# Patient Record
Sex: MALE | Race: WHITE | URBAN - METROPOLITAN AREA
[De-identification: names, ages, dates, MRNs, and addresses within clinical notes are randomized per-mention and may not be internally consistent; named-entity substitution may affect disease eponyms.]

---

## 2017-10-21 ENCOUNTER — HOSPITAL ENCOUNTER (INPATIENT)
Dept: MEDSURG UNIT | Facility: CLINIC | Age: 72
Discharge: HOME | End: 2017-11-15
Attending: INTERNAL MEDICINE | Admitting: INTERNAL MEDICINE

## 2017-10-21 ENCOUNTER — ANESTHESIA - HEALTHEAST (OUTPATIENT)
Dept: INTERVENTIONAL RADIOLOGY/VASCULAR | Facility: CLINIC | Age: 72
End: 2017-10-21

## 2017-10-21 DIAGNOSIS — I60.8 ANEURYSMAL SUBARACHNOID HEMORRHAGE (H): ICD-10-CM

## 2017-10-21 DIAGNOSIS — I72.9 ANEURYSM (H): ICD-10-CM

## 2017-10-21 DIAGNOSIS — I60.9 SAH (SUBARACHNOID HEMORRHAGE) (H): ICD-10-CM

## 2017-10-21 DIAGNOSIS — R52 PAIN: ICD-10-CM

## 2017-10-21 DIAGNOSIS — S30.810A EXCORIATION OF BUTTOCK, INITIAL ENCOUNTER: ICD-10-CM

## 2017-10-21 DIAGNOSIS — R53.81 PHYSICAL DECONDITIONING: ICD-10-CM

## 2017-10-21 DIAGNOSIS — G91.1 OBSTRUCTIVE HYDROCEPHALUS (H): ICD-10-CM

## 2017-10-21 DIAGNOSIS — G93.6 BRAIN EDEMA (H): ICD-10-CM

## 2017-10-21 DIAGNOSIS — G93.40 ENCEPHALOPATHY ACUTE: ICD-10-CM

## 2017-10-21 DIAGNOSIS — F06.30 MOOD DISORDER DUE TO A GENERAL MEDICAL CONDITION: ICD-10-CM

## 2017-10-21 ASSESSMENT — MIFFLIN-ST. JEOR
SCORE: 1622.5
SCORE: 1661.5
SCORE: 1622.5

## 2017-10-22 ENCOUNTER — ANESTHESIA - HEALTHEAST (OUTPATIENT)
Dept: SURGERY | Facility: CLINIC | Age: 72
End: 2017-10-22

## 2017-10-22 ENCOUNTER — SURGERY - HEALTHEAST (OUTPATIENT)
Dept: SURGERY | Facility: CLINIC | Age: 72
End: 2017-10-22

## 2017-10-22 LAB
ATRIAL RATE - MUSE: 75 BPM
DIASTOLIC BLOOD PRESSURE - MUSE: NORMAL MMHG
INTERPRETATION ECG - MUSE: NORMAL
P AXIS - MUSE: 38 DEGREES
PR INTERVAL - MUSE: 172 MS
QRS DURATION - MUSE: 94 MS
QT - MUSE: 404 MS
QTC - MUSE: 451 MS
R AXIS - MUSE: 28 DEGREES
SYSTOLIC BLOOD PRESSURE - MUSE: NORMAL MMHG
T AXIS - MUSE: 45 DEGREES
VENTRICULAR RATE- MUSE: 75 BPM

## 2017-10-23 ASSESSMENT — MIFFLIN-ST. JEOR
SCORE: 1656.5
SCORE: 1656.5

## 2017-10-25 ASSESSMENT — MIFFLIN-ST. JEOR
SCORE: 1691.5
SCORE: 1691.5

## 2017-10-26 ASSESSMENT — MIFFLIN-ST. JEOR
SCORE: 1653.5
SCORE: 1653.5

## 2017-10-27 ASSESSMENT — MIFFLIN-ST. JEOR
SCORE: 1642.5
SCORE: 1642.5

## 2017-10-28 ASSESSMENT — MIFFLIN-ST. JEOR
SCORE: 1697.5
SCORE: 1697.5

## 2017-10-29 ASSESSMENT — MIFFLIN-ST. JEOR
SCORE: 1595.5
SCORE: 1595.5

## 2017-10-30 ASSESSMENT — MIFFLIN-ST. JEOR
SCORE: 1560.5
SCORE: 1560.5

## 2017-10-31 ASSESSMENT — MIFFLIN-ST. JEOR
SCORE: 1580.41
SCORE: 1580.41

## 2017-11-01 ASSESSMENT — MIFFLIN-ST. JEOR
SCORE: 1556.82
SCORE: 1556.82

## 2017-11-02 ASSESSMENT — MIFFLIN-ST. JEOR
SCORE: 1556.82
SCORE: 1556.82

## 2017-11-07 ASSESSMENT — MIFFLIN-ST. JEOR
SCORE: 1526.43
SCORE: 1526.43

## 2017-11-08 ASSESSMENT — MIFFLIN-ST. JEOR
SCORE: 1531.87
SCORE: 1531.87

## 2017-11-09 ASSESSMENT — MIFFLIN-ST. JEOR
SCORE: 1518.26
SCORE: 1518.26

## 2017-11-10 ASSESSMENT — MIFFLIN-ST. JEOR
SCORE: 1518.26
SCORE: 1518.26

## 2017-11-13 RX ORDER — QUETIAPINE FUMARATE 25 MG/1
25 TABLET, FILM COATED ORAL AT BEDTIME
Qty: 30 TABLET | Refills: 0 | Status: SHIPPED | OUTPATIENT
Start: 2017-11-13

## 2017-11-13 RX ORDER — ACETAMINOPHEN 325 MG/1
650 TABLET ORAL EVERY 4 HOURS PRN
Refills: 0 | Status: SHIPPED | COMMUNITY
Start: 2017-11-13

## 2017-11-13 RX ORDER — LISINOPRIL 20 MG/1
20 TABLET ORAL 2 TIMES DAILY
Qty: 60 TABLET | Refills: 0 | Status: SHIPPED | OUTPATIENT
Start: 2017-11-13

## 2017-11-14 ASSESSMENT — MIFFLIN-ST. JEOR
SCORE: 1507.83
SCORE: 1507.83

## 2017-11-15 ENCOUNTER — COMMUNICATION - HEALTHEAST (OUTPATIENT)
Dept: NEUROSURGERY | Facility: CLINIC | Age: 72
End: 2017-11-15

## 2017-11-15 LAB
CREAT SERPL-MCNC: 1.06 MG/DL (ref 0.7–1.3)
GFR SERPL CREATININE-BSD FRML MDRD: >60 ML/MIN/1.73M2

## 2017-11-15 RX ORDER — LABETALOL 100 MG/1
100 TABLET, FILM COATED ORAL 2 TIMES DAILY
Qty: 60 TABLET | Refills: 0 | Status: SHIPPED | OUTPATIENT
Start: 2017-11-15

## 2017-11-15 ASSESSMENT — MIFFLIN-ST. JEOR
SCORE: 1504.5
SCORE: 1504.5

## 2017-12-01 ENCOUNTER — COMMUNICATION - HEALTHEAST (OUTPATIENT)
Dept: NEUROSURGERY | Facility: CLINIC | Age: 72
End: 2017-12-01

## 2018-02-06 ENCOUNTER — OFFICE VISIT - HEALTHEAST (OUTPATIENT)
Dept: NEUROSURGERY | Facility: CLINIC | Age: 73
End: 2018-02-06

## 2018-02-06 DIAGNOSIS — I67.1 CEREBRAL ANEURYSM: ICD-10-CM

## 2018-07-28 ENCOUNTER — COMMUNICATION - HEALTHEAST (OUTPATIENT)
Dept: SCHEDULING | Facility: CLINIC | Age: 73
End: 2018-07-28

## 2021-05-31 VITALS
WEIGHT: 175.27 LBS | HEIGHT: 68 IN | WEIGHT: 175.27 LBS | BODY MASS INDEX: 26.56 KG/M2 | HEIGHT: 68 IN | BODY MASS INDEX: 26.56 KG/M2

## 2021-06-13 NOTE — PROGRESS NOTES
Patient is approved for Emergency Medical Assistance (EMA). ID # [id number]. The MA system has patient's full name which is Markel Ocampo. EMA Care Plan Request will now be submitted (needed EMA in place in order to submit). Raghav, SIOMARA [id number]

## 2021-06-13 NOTE — PROGRESS NOTES
Speech Language/Pathology  Speech Language Evaluation    History:  Diagnosis:   Patient Active Problem List   Diagnosis     SAH (subarachnoid hemorrhage)     Benign essential HTN     Hydrocephalus     Aneurysm     Brain edema     Aneurysmal subarachnoid hemorrhage     Onset date: 10/22/17  Reason for evaluation: SAH with 4 aneurysm clippings 10/22/17   Pertinent History: per H&P: Callie Crocker is a 72 y.o. old male Georgian-speaking male that had the sudden onset of lightheadedness, dizziness and nausea with vomiting and a witnessed syncopal event outside of a store where he was buying earbuds.  This was witnessed by family.  He vomited again in route per the emergency medical service.  He was incontinent of urine.  He complained of a severe headache.  In the emergency room acute subarachnoid hemorrhage was noted on CT scan.  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  Cerebral angiogram showed multiple intracranial aneurysms (5) and he is now scheduled for open surgical exploration and clipping tomorrow.    Subjective:  Patient presents as lethargic and cooperative during this session. Pt was given dilaudid about 1 hour prior evaluation.  An  was present via nephew interpreting.  Objective:  Oral motor function was mildly impaired due to (R) labial droop, however this did not seem to impair speech or swallow.  Motor speech was not impaired.   Speech intelligibility was approximately 100 % at the short phrase level.  Voice was not impaired.  Trach/Vent: NA    Auditory comprehension was not impaired. Patient was able to answer 1 unit yes/no questions with 100 % accuracy independently. Patient was able to follow 1 step  directions with 100 % accuracy independently.    Verbal expression was not impaired. Confrontational naming was 100 % accurate independently. Patient was able to participate in conversation independently.    Cognition was moderately impaired. Patient was  oriented to person and month of year only. He did not know he was in the hospital.  Memory was moderately impaired. Unable to recall that he was in hospital following 3 minute delay, he did recall year. This impairment could also be related to dilaudid.  Problem solving was NT    Assessment:  Patient presents with deficits in, cognition and memory.    Plan:  Speech therapy 3-4 times per week.  Referrals: N/A    20 speech/language minutes     Misti Weston MS, CCC-SLP

## 2021-06-13 NOTE — PROGRESS NOTES
"Spiritual Care Note    Spiritual Assessment:   referred to patient and family this morning by patient's nurse. Patient sitting up in his chair when  arrives; son is present.  spoke with patient via an interpretor. Patient does seem somewhat confused during our visit. Patient spoke about, \"Love being everything and God being everything.\" Patient praised the  for the work she does but didn't appear to make any direct requests of the  for support. Son does seem concerned by his father's confusion but also seems to believe he is improving.     Care Provided:  offered listening and words of support.     Plan of Care: Spiritual care will continue to follow as part of patient's care team.     TED King, BCC    "

## 2021-06-13 NOTE — PROGRESS NOTES
"HOSPITALIST PROGRESS NOTE    Date of Service:  10/25/2017    Primary Care Physician: No Primary Care Provider    Hospital Summary: Callie Crocker is a 72 y.o. male with a history of admitted for lightheadedness dizziness nausea vomiting syncopal episode.  In the ED on evaluation had an acute subarachnoid hemorrhage with a anterior communicating aneurysm.  Underwent cerebral angiography showing multiple intracranial aneurysms.  Patient had clipping of the left MCA and other aneurysms with ventriculostomy placement, close management by neurology and neurosurgery as noted.    SUBJECTIVE: Moderately sedated sleeping    OBJECTIVE:  General Appearance:   BP Readings from Last 3 Encounters:   10/25/17 157/70       /70 (Patient Position: Lying)  Pulse (!) 101  Temp 98.2  F (36.8  C) (Oral)   Resp 15  Ht 5' 8\" (1.727 m)  Wt 208 lb 12.4 oz (94.7 kg)  SpO2 100%  BMI 31.74 kg/m2      GENERAL:  Sleeping  Chest clear to auscultation cardiac exam regular rate and rhythm no peripheral edema    I reviewed the patient's new imaging results.      Assessment and Plan:    Plans for 10/25/2017  -at This point management per neurology and neurosurgery  -Nothing to add from a medical standpoint, we continue to follow peripherally    Acute intracranial hemorrhage  Subarachnoid hemorrhage from aneurysm  Assessment: Status post clipping per neurosurgery being managed closely by neurology and neurosurgery  Plan:  We continue to follow    Hypertension  Assessment: Blood pressure being managed including lisinopril and nimodipine  Plan:  -Continue    Lenard Hoover M.D.  [facility] hospitalist service  7 AM to 6 PM please page through [employer] home page  "

## 2021-06-13 NOTE — PROGRESS NOTES
This note also relates to the following rows which could not be included:  SpO2 - Cannot attach notes to unvalidated device data       10/29/17 0530   Vitals   Heart Rate 67   Resp 16   /81   MAP (mmHg) (Calculated) 110   Noninvasive BP (Mean) 116   ICP/Pressure   ICP  9 mmHg   Hydralazine 20 mg IV push given once to keep SBP <160. Will continue to monitor.

## 2021-06-13 NOTE — PROGRESS NOTES
Critical Care Medicine           11/1/20178:36 AM    Patient seen on morning rounds.     Impulsive behavior, grabbing at support tubes/lines.   Bilateral soft wrist restraints in place.   Ongoing reassessment for restraint need.   Will d/c restraints when able to safely maintain support tubes in place with verbal redirection.    Leilani Wood, CNP

## 2021-06-13 NOTE — PROGRESS NOTES
Progress Note    Assessment/Plan  73 yo M with h/o hypertension who presented 10/21/17 after a witnessed syncopal event. He had sudden onset dizziness, light headedness, nausea, and vomiting. He was taken to ER, CT scan showed acute subarachnoid hemorrhage centered within the basal cisterns; CTA revealed 4.6 x 3.4 mm anteriorly directed CONCEPCION aneurysm. Cerebral angiogram revealed multiple intracranial aneurysms. He underwent emergent left sided crani and clipping of left MCA, left anterior choroidal, left carotid terminus, left CONCEPCION; and placement of ventriculostomy by Dr. RENO Espinal. There remains one unsecured aneurysm on the right. Neurosurgery removed drain 11/1/17. Due to concern about fluctuating mental status and possible seizures continuous EEG performed by Neuro and Keppra dose increased. No clear evidence of seizures. No vasospasm on TCD.      Principal Problem:    SAH (subarachnoid hemorrhage) POD#14, PBD #15 - secondary to ruptured cerebral aneurysm s/p crani with multiple clips    Obstructive Hydrocephalus - External ventricular drain removed 11/1/17 by Neurosurgery        Fluctuating mental status/Acute encephalopathy:   Very slow to improve.  Confused and impulsivity.   No clear seizure on EEG and no vasospasm on TCDs.  Keppra  Low dose seroquel.  This will take quite awhile to improve.        Active Problems:    Fevers on admission (resolved now) - possibly central.  Urine Cx negative. Procalcitonin negative and CXR without infiltrates to suggest pneumonia.  Completed 5 days of empiric Zosyn.        Benign essential HTN - SBP goal 110-160. Continue nimodipine,labetalol and lisinopril        Brain edema - keep sodium in normal to upper normal range       Hyperglycemia - BG monitoring qid and coverage with SSI        Barriers to Discharge : Ongoing Encephalopathy and placement issues.    Anticipated Discharge : Acute rehab vs. LTACH     Principal Problem:    SAH (subarachnoid hemorrhage)  Active  Problems:    Benign essential HTN    Obstructive hydrocephalus    Brain edema    Aneurysmal subarachnoid hemorrhage    Nontraumatic subarachnoid hemorrhage    Encephalopathy acute    Seizure-like activity    Hypernatremia    Brain compression      Subjective    Per sons.  Is still quite confused.  Was eating and ambulating earlier in the day.  No major complaints except mild back pain     Objective    Vital signs in last 24 hours  Temp:  [96.3  F (35.7  C)-97.6  F (36.4  C)] 96.3  F (35.7  C)  Heart Rate:  [64-72] 70  Resp:  [12-21] 21  BP: (119-183)/(64-82) 183/82  Weight:   186 lb 12.8 oz (84.7 kg)    Intake/Output last 3 shifts  I/O last 3 completed shifts:  In: 900 [P.O.:900]  Out: -   Intake/Output this shift:  I/O this shift:  In: -   Out: 100 [Urine:100]    Physical Exam    General appearance: sleeping while seen today.  No distress.  Did open eyes to his son's voice  Previous exams noted (confused and pulling at lines and picking at incision site on scalp. Very impulsive and getting out of bed and chair. Requiring soft wrist restraints.)  Lungs: clear to auscultation bilaterally.  Did note some sleep apnea  Heart: regular rate and rhythm, S1, S2 normal  Abdomen: soft, non-tender  Extremities: atraumatic, no cyanosis or edema  Neurologic: limited while sleeping.  Earlier walking around the unit            Meds    nacl 0.9% Stopped (10/29/17 2300)       influenza vacc high dose (age 65 or >) (PF) 2017-18  0.5 mL Intramuscular Prior to Discharge     insulin aspart (NovoLOG) injection   Subcutaneous TID with meals     insulin aspart (NovoLOG) injection   Subcutaneous QHS     labetalol  100 mg Oral TID     levETIRAcetam  1,000 mg Oral BID     lidocaine (XYLOCAINE) 1 % local injection  10 mL Infiltration Once     lisinopril  40 mg Oral DAILY     niMODipine  60 mg Oral Q4H    Or     niMODipine  60 mg Enteral Tube Q4H     pantoprazole  40 mg Oral QAM AC     polyethylene glycol  17 g Oral DAILY     QUEtiapine  12.5  mg Oral QHS     senna-docusate  1 tablet Oral BID       Pertinent Labs   Lab Results: personally reviewed.   not applicable      Results from last 7 days  Lab Units 11/05/17 0457 11/04/17  0606 11/03/17 0444   LN-SODIUM mmol/L 137 140 136   LN-POTASSIUM mmol/L 4.0 4.0 3.9   LN-CHLORIDE mmol/L 110* 113* 110*   LN-CO2 mmol/L 17* 17* 18*   LN-BLOOD UREA NITROGEN mg/dL 21 25 19   LN-CREATININE mg/dL 0.74 0.82 0.75   LN-CALCIUM mg/dL 9.3 9.5 9.2           Results from last 7 days  Lab Units 11/05/17 0457 11/04/17 0606 11/03/17 0444   LN-WHITE BLOOD CELL COUNT thou/uL 5.0 5.3 5.2   LN-HEMOGLOBIN g/dL 12.4* 13.0* 12.0*   LN-HEMATOCRIT % 36.7* 38.8* 35.6*   LN-PLATELET COUNT thou/uL 436 428 395       Chloé Zuñiga MD  Internal Medicine Hospitalist  11/5/2017, 2:41 PM

## 2021-06-13 NOTE — PROGRESS NOTES
Critical Care Progress Note  10/29/2017   11:28 AM      Admit Date: 10/21/2017  ICU Day: 7  Code Status: full code      Problem List:   Principal Problem:    SAH (subarachnoid hemorrhage)  Active Problems:    Benign essential HTN    Hydrocephalus    Brain edema    Aneurysmal subarachnoid hemorrhage    Nontraumatic subarachnoid hemorrhage    Encephalopathy acute    Seizure-like activity    Hypernatremia          Plan:   1. blood pressure control; parameters set by Neurosurgery with Systolic goal <160. Currently off cardene; receiving PRNs. Lisinopril 40 mg daily and labetalol to 100 mg TID, last increased 10/28/17.   2. Ventric management per Neurosurgery.  Continue to wean ventric.  Plan is to raise the ventriculostomy today and possibly clamp later.  3. Autodiuresing; watch sodium, now normalized.     4. Repeat imaging for any change in neuro exam.  5. Nimodipine as ordered by Neurosurgery.  6. Daily TCDs  7. Continue Zosyn for now and likely complete 7-10 days, vancomycin was discontinued    ICU Prophylaxis   Feeding: as above  Pringle: yes - strict I/O   Analgesia/Sedation: prn  DVT prophylaxis: SCDs  HOB > 30 degrees: Yes - must be maintained at 30degrees.    GI Proph: Protonix   Glycemic Control: SSI   Family updated: yes - at bedside    Dispo: ICU due to ongoing monitoring of ICP in patient with multiple intracranial aneurysms. High risk for acute deterioration and death.     Arjun Bourne, [facility] Pulmonary/Critical Care    Total critical care time: 30 minutes  _______________________________________________________________    HPI: 72 year old gentleman with PMH of hypertension. He presented 10/21 after a witnessed syncopal event. He had sudden onset dizziness, light headedness, nausea and vomiting. He was taken to ER, CT scan showed acute subarachnoid hemorrhage centered within the basal cisterns; CTA revealed 4.6 x 3.4 mm anteriorly directed CONCEPCION aneurysm. Cerebral angiogram revealed multiple intracranial  aneurysms. He underwent emergent left sided crani and clipping of left MCA, left anterior choroidal, left carotid terminus, left CONCEPCION; and placement of ventriculostomy by Dr. RENO Espinal. There remains one unsecured aneurysm on the right.   Post op he was taken to ICU for further monitoring and care.     No reported pain today.  No significant fevers overnight.  Mental status remains unchanged.  Is not on any infusions and has less agitation.    ROS: unable to obtain - patient very drowsy.      Events over last 24-hours: no acute events overnight.     Objective:     Vitals:    10/29/17 1100 10/29/17 1200 10/29/17 1300 10/29/17 1400   BP: 150/70 156/74 148/72 148/70   Patient Position:       Pulse: 77 71 70 75   Resp: (!) 29 18 21 20   Temp: 99.2  F (37.3  C)      TempSrc:       SpO2: 97% 100% 99% 100%   Weight:       Height:           I/O:     Intake/Output Summary (Last 24 hours) at 10/29/17 1420  Last data filed at 10/29/17 1400   Gross per 24 hour   Intake              929 ml   Output             2370 ml   Net            -1441 ml     Wt Readings from Last 3 Encounters:   10/29/17 195 lb 5.2 oz (88.6 kg)      Weight change: -22 lb 7.8 oz (-10.2 kg)    Physical Exam:  Gen:  Drowsy (just received Dilaudid).   HEENMT:PERRLA, extra ocular movement intact, sclera clear, anicteric and Crani cap in place; Ventric with clearer output. Right facial droop.   NEURO: moves all extremities, right is weaker. More difficult neuro exam presently due to narcotic.   CARDIOVASCULAR: S1, S2 normal, no murmur, rub or gallop, regular rate and rhythm  PULMONARY:Unlabored on RA; lungs are clearer today. No wheeze.    GASTROINTESTINAL: abdomen is soft - active bowel sounds.   INTEGUMENT: generalized edema    Labs:     Results from last 7 days  Lab Units 10/29/17  0544   LN-SODIUM mmol/L 139   LN-POTASSIUM mmol/L 3.7   LN-CHLORIDE mmol/L 112*   LN-CO2 mmol/L 18*   LN-BLOOD UREA NITROGEN mg/dL 15   LN-CREATININE mg/dL 0.77   LN-CALCIUM mg/dL  8.4*         Results from last 7 days  Lab Units 10/29/17  0544   LN-WHITE BLOOD CELL COUNT thou/uL 6.1   LN-HEMOGLOBIN g/dL 11.3*   LN-HEMATOCRIT % 33.0*   LN-PLATELET COUNT thou/uL 250       Micro: none this admission     Imaging: all imaging personalized reviewed   10/24 Head CT - 1.  Interval left-sided craniotomy with placement of three aneurysm clips.  2.  Fluid collection subjacent to the craniotomy and bifrontal pneumocephalus.  3.  Interval decreased subarachnoid hemorrhage and increased intraventricular hemorrhage.  4.  Left frontal approach ventriculostomy with decompressed ventricular system.  5.  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  6.  Mild mass effect with mild rightward shift of midline structures by 4 mm.    10/24 CXR - Mild pulmonary venous congestion. Trace bilateral pleural effusions. No pneumothorax. The osseous structures are intact. The cardiac mediastinal silhouette is within normal limits.

## 2021-06-13 NOTE — PROGRESS NOTES
NEUROSURGERY POST-OP NOTE:    ASSESSMENT:      Callie Crocker is POD # 5, Bleed Day #6 status post  LEFT SIDED CRANIOTOMY AND CLIPPING OF MULTIPLE INTRACRANIAL ANEURYSMS (left MCA, Left anterior choroidal, left carotid terminus, left CONCEPCION),  AND PLACEMENT OF VENTRICULOSTOMY  by Dr. Lisette Espinal. Complete occlusion of left sided clipped aneurysms. Left MCA aneurysm with clot present, likely to be cause of SAH. Patient still has one unsecured aneurysm on the right.          Confusion w/o agitation   Low grade temp - UA culture negative --Final on BC pending   TCD not suggestive of vasospasms   ICPs < 10  Start ventric wean   1 unsecured aneurysm--better control of BP of < 160 goal           PLAN:           Patient Active Problem List   Diagnosis     SAH (subarachnoid hemorrhage)     Benign essential HTN  SBP Goal <160 >110.       Hydrocephalus  -Maintain Ventric at 15 cm, if stable ICPs increase to 20 cm tomorrow--pending venric wean anticipate dischg home in care of family next week     -Q 2 neuro checks  -Elevate HOB at least or higher 30 degrees  -1:1 --no sedation unless safety issue , discuss w neurosurgery before giving benzos    Track TCD and exam closely for sx/s of vasospasms--TCDs to be dc'd after tomorrow      Aneurysm     Brain edema  Encephalopathy-Precedex as needed  w low RASS of 0-1-     Aneurysmal subarachnoid hemorrhage  -Continue TCDs day 3-7, monitor clinically for s/sx of vasospasm such as headache/stroke symptoms Stop 10/29   -Would like to see patient euvolemia or positive volume status. Strict I/Os.  Clamp ventric for short periods and progress activity   Continue kelley to monitor I&O       Fever: IV Zosyn Cultures negative --Vanco stopped --follow       Seizure like activity: Keppra 500mg BID x 30 days      Blood loss anemia- Stable Track and follow for symptoms--considerations for transfusion if < 8.0      Hypernatremia: Improved--Goal normal sodium, not currently on sodium  supplementation. Daily Sodiums                           Discharge Recommendations/Plan:  Barriers to Discharge: yes, aneurysmal SAH with ventriculostomy placement and need for ongoing tracking for vasospasms      Follow Up Plan: Right unsecured aneurysm will need to be addressed after this event, possible follow up with IR vs surveillance monitoring. Plan TBD per Dr. Lisette Espinal               HPI: Mak Ortiz is status post LEFT SIDED CRANIOTOMY AND CLIPPING OF MULTIPLE INTRACRANIAL ANEURYSMS, INTRA-OP ANGIOS AND PLACEMENT OF VENTRICULOSTOMY  by Dr. Lisette Espinal. He is 72 year old male presented after sudden onset of lightheadedness, dizziness, nausea and vomiting and syncopal event witnessed by his son. CT in the ED revealed subarachnoid hemorrhage, CTA 4.6 x 3.4 mm anteriorly directed CONCEPCION aneurysm. 4V revealed multiple aneurysms as detailed below. He was taken to the OR for clipping with Dr. Espinal. All four of the left sided aneurysms were clipped and completely occluded, the right sided aneurysm remains unsecured.        SUBJECTIVE:  No issues over night       OBJECTIVE:  Vital signs in last 24 hours  Temp:  [98.7  F (37.1  C)-100.8  F (38.2  C)] 98.9  F (37.2  C)  Heart Rate:  [61-86] 63  Resp:  [11-36] 16  BP: (109-181)/(55-86) 151/70  Body mass index is 33.12 kg/(m^2).    Mental Status: alert, personspeech normal  Cranial Nerves: Right sided facial weakness  Motor Strength:normal 5/5 strength in all tested muscle groups  Incision: Incision was approximated with staples and covered with Other: open to air  The incision is clean, dry and no drainage   Ventric patency confirmed. Good wave form.  @15 cm  ICPs < 10  Dilute bloody 174 cc last 24 hours      Pertinent Labs   Lab Results:   Results for MAK ORTIZ (MRN [id number]) as of 10/28/2017 14:41   Ref. Range 10/26/2017 15:20 10/27/2017 00:40 10/27/2017 04:11 10/27/2017 11:16 10/28/2017 04:40   Sodium Latest Ref Range: 136 - 145  mmol/L 141 137 145 144 141

## 2021-06-13 NOTE — PROGRESS NOTES
Speech Language/Pathology  Speech Therapy Daily Progress Note    Patient presents as alert, cooperative and confused during this session.  An  was not present and in house not available. Utilized a combination of language line and a family member.    Objective  Swallow: Trialed increased textures via tay cracker. Patient needed mod assistance with denture placement but good fit once placed. Patient had adequate mastication with cracker but had confusion, talking while eating and decreased awareness of buccal stasis.     Assessment  Potential to tolerate diet upgrade but not ready yet at this time. Will continue trials. Continue to address cognition as well, patient will need in house  for cognitive tasks.     Plan/Recommendations    current goals remain appropriate    15 dysphagia minutes     Lesley Phan MA, CCC-SLP

## 2021-06-13 NOTE — PROGRESS NOTES
Pulmonary/Critical Care  10/24/2017  8:39 AM     Patient seen on morning interdisciplinary rounds.   Need for ongoing bilateral soft wrist restraints assessed and confirmed.     Angella Robb, [facility] Pulmonary/Critical Care

## 2021-06-13 NOTE — ANESTHESIA CARE TRANSFER NOTE
Last vitals:   Vitals:    10/21/17 1843   BP: 123/59   Pulse: 95   Resp: 20   Temp: 36.6  C (97.9  F)   SpO2: 98%     Patient's level of consciousness is drowsy  Spontaneous respirations: yes  Maintains airway independently: yes  Dentition unchanged: yes  Oropharynx: oropharynx clear of all foreign objects    QCDR Measures:  ASA# 20 - Surgical No Data Recorded  PQRS# 430 - Adult PONV Prevention: 4558F - Pt received => 2 anti-emetic agents (different classes) preop & intraop  ASA# 8 - Peds PONV Prevention: NA - Not pediatric patient, not GA or 2 or more risk factors NOT present  PQRS# 424 - Elena-op Temp Management: 4559F - At least one body temp DOCUMENTED => 35.5C or 95.9F within required timeframe  PQRS# 426 - PACU Transfer Protocol: - Transfer of care checklist used  ASA# 14 - Acute Post-op Pain: ASA14B - Patient did NOT experience pain >= 7 out of 10

## 2021-06-13 NOTE — PROGRESS NOTES
Pharmacy Note - Admission Medication History  ______________________________________________________________________    Prior To Admission (PTA) med list completed and updated in EMR.       PTA Med List   Medication Sig Last Dose     doxylamine (UNISON) 25 mg tablet Take 25 mg by mouth at bedtime as needed for sleep. Unknown at Unknown time       Information source(s): Patient and Family member    Summary of Changes to PTA Med List  New: Unison  Discontinued: n/a  Changed: n/a    Patient was asked about OTC/herbal products specifically.  PTA med list reflects this.    Based on the pharmacist s assessment, the PTA med list information appears unreliable: due to language barrier, inability of patient to communicate and records unavailable - through  (friend of family) patient stated he does not take a baby aspirin and does not often visit the clinic.  After discussion with daughter in-law, the patient was taking a blood pressure medication at one point in the past however they were unsure what that was.  Patient uses [facility] for healthcare needs.    Patient appears adherent: Unable to assess    Allergies were reviewed, assessed, and updated with the patient.      Patient does not use any multi-dose medications prior to admission.    Thank you for the opportunity to participate in the care of this patient.    Giacomo Marin, PharmD  10/21/2017 2:25 PM

## 2021-06-13 NOTE — PROGRESS NOTES
Occupational Therapy  SLUMS  Scoring If High School Educated If Less than High School Educated   Normal 27-30 25-30   Mild Neurocognitive Disorder 21-26 20-24   Dementia 1-20 1-19   The SLUMS is a cognitive screening assessment used to identify the presence of cognitive deficits, and/or to identify a change in cognition over time.      High school education: yes/ Yes or No    Patient Score: 1/30    Orientation: 0/3  Short term memory: 0/5  Language: 1/3  Attention: 0/13  Visuospatial/executive function: 0/6      Score Interpretation: Pt demonstrates deficits in the areas noted above. Recommending pt have assistance with all adl's, to ensure safety upon discharge. Please note that this examination is used to screen individuals to look for the presence of cognitive deficits, and to identify changes in cognition over time. This is not a diagnosis and the examination should be followed by further cognitive assessments if deficits are observed.     Recommend tcu.    10/31/2017 by Aramis Gilbert, OT

## 2021-06-13 NOTE — PROGRESS NOTES
Critical Care Progress Note  10/24/2017   12:11 PM    Admit Date: 10/21/2017  ICU Day: 3  Code Status: full code      Problem List:   Principal Problem:    SAH (subarachnoid hemorrhage)  Active Problems:    Benign essential HTN    Hydrocephalus    Brain edema    Aneurysmal subarachnoid hemorrhage          Plan:   1. Tight blood pressure control; parameters set by Neurosurgery with Systolic goal 110 - 160. Using Cardene currently; avoid precipitous drops or spikes in pressure. PRNs available. Will consider scheduling some IV meds if difficult to control on Cardene.   2. Ventric management per Neurosurgery. No weaning until bleed day #7 (today is day 3).   3. Pneumocephalus on CT scan being treated with 24-hours hyperoxygenation at 100% FiO2 via face mask.   4. Give one dose Lasix 20mg IVP. Aiming for euvolemia to slightly volume up.   5. Hematemesis this AM - start IV Protonix and check hemogram. Consider GI consult if hemoglobin drop.   6. Bowel medications ordered; PRNs available.   7. Daily TCDs  8. Nimodipine as ordered by Neurosurgery.   9. Repeat imaging for any change in neuro exam.   10. Keppra BID  11. Follow Sodiums daily; aim for normonatremia.   12. Cut back fluids to 75mL/hr.   13. Low grade fever and leukocytosis yesterday; rechecking hemogram today, will also add Procal and lactic acid.     ICU Prophylaxis   Feeding: NPO due to drowsiness  Pringle: yes - strict I/O   Analgesia/Sedation: prn - avoid sedating   DVT prophylaxis: SCDs  HOB > 30 degrees: Yes - must be maintained at 30degrees.    GI Proph: Protonix   Glycemic Control: SSI   Family updated: yes - at bedside    Dispo: ICU due to ongoing monitoring of ICP in patient with multiple intracranial aneurysms. High risk for acute deterioration and death.     Angella Robb, [facility] Pulmonary/Critical Care    Total critical care time: 35 minutes  _______________________________________________________________    HPI: 72 year old gentleman with  PMH of hypertension. He presented 10/21 after a witnessed syncopal event. He had sudden onset dizziness, light headedness, nausea and vomiting. He was taken to ER, CT scan showed acute subarachnoid hemorrhage centered within the basal cisterns; CTA revealed 4.6 x 3.4 mm anteriorly directed CONCEPCION aneurysm. Cerebral angiogram revealed multiple intracranial aneurysms. He underwent emergent left sided crani and clipping of left MCA, left anterior choroidal, left carotid terminus, left CONCEPCION; and placement of ventriculostomy by Dr. RENO Espinal. There remains one unsecured aneurysm on the right.   Post op he was taken to ICU for further monitoring and care.     Patient's family member provides translation.     ROS: very confused but reports no pain. Does have some shortness of breath.     Events over last 24-hours: confused and restless overnight.     Objective:     Vitals:    10/24/17 1000 10/24/17 1015 10/24/17 1030 10/24/17 1130   BP:       Patient Position:       Pulse: 81 80 85 81   Resp: 21 20 (!) 35 25   Temp:    (!) 100.6  F (38.1  C)   TempSrc:    Oral   SpO2: 100% 100% 100% 100%   Weight:       Height:           I/O:   Intake/Output Summary (Last 24 hours) at 10/24/17 1211  Last data filed at 10/24/17 0800   Gross per 24 hour   Intake          4572.84 ml   Output             1368 ml   Net          3204.84 ml     Wt Readings from Last 3 Encounters:   10/23/17 208 lb 12.4 oz (94.7 kg)      Weight change:     Physical Exam:  Gen: restless lying in bed, pulling at restraints.   HEENMT:PERRLA, extra ocular movement intact, sclera clear, anicteric and Crani cap in place; Ventric with bloody output  NEURO: moves all extremities   CARDIOVASCULAR: S1, S2 normal, no murmur, rub or gallop, regular rate and rhythm  PULMONARY:Unlabored on Facemask; lungs are coarse throughout, no wheeze.   GASTROINTESTINAL: distended and tight - active bowel sounds.   INTEGUMENT: generalized edema  PSYCH: confused, lethargic, disoriented    Labs:    Results from last 7 days  Lab Units 10/24/17  0445  10/21/17  1321   LN-SODIUM mmol/L 141  142  < > 138   LN-POTASSIUM mmol/L 3.7  3.7  < > 4.1   LN-CHLORIDE mmol/L 117*  < > 106   LN-CO2 mmol/L 17*  < > 25   LN-BLOOD UREA NITROGEN mg/dL 28  < > 12   LN-CREATININE mg/dL 0.93  < > 0.79   LN-CALCIUM mg/dL 8.2*  < > 9.1   LN-PROTEIN TOTAL g/dL  --   --  7.4   LN-BILIRUBIN TOTAL mg/dL  --   --  0.8   LN-ALKALINE PHOSPHATASE U/L  --   --  110   LN-ALT (SGPT) U/L  --   --  24   LN-AST (SGOT) U/L  --   --  25   < > = values in this interval not displayed.      Results from last 7 days  Lab Units 10/23/17  0453   LN-WHITE BLOOD CELL COUNT thou/uL 14.9*   LN-HEMOGLOBIN g/dL 11.6*   LN-HEMATOCRIT % 32.7*   LN-PLATELET COUNT thou/uL 181       Micro: none this admission     Imaging: all imaging personalized reviewed   10/24 Head CT - 1.  Interval left-sided craniotomy with placement of three aneurysm clips.  2.  Fluid collection subjacent to the craniotomy and bifrontal pneumocephalus.  3.  Interval decreased subarachnoid hemorrhage and increased intraventricular hemorrhage.  4.  Left frontal approach ventriculostomy with decompressed ventricular system.  5.  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  6.  Mild mass effect with mild rightward shift of midline structures by 4 mm.    10/24 CXR - Mild pulmonary venous congestion. Trace bilateral pleural effusions. No pneumothorax. The osseous structures are intact. The cardiac mediastinal silhouette is within normal limits.      Angella Robb, [facility] Pulmonary/Critical Care

## 2021-06-13 NOTE — H&P
Admission History and Physical    Callie Crocker,  1945, MRN [id number]    [facility]    PCP: No Primary Care Provider, None    Assessment and Plan   1.  Subarachnoid hemorrhage with multiple aneurysms  I am going to the OR for clipping of multiple aneurysms and trachea last to be placement tomorrow with Dr. Espinal.  Tight blood pressure control underway.  Would benefit from better blood pressure control as an outpatient.  Seizure medication on board.  Will check an EKG in anticipation of surgery.  2.  Hypertension  Has been on medication intermittently per her family.  Currently not taking anything.  3.  DVT and Ulcer prophylaxis  DVT prophylaxis due to bleed, H2 blocker underway.          Chief Complaint:  Syncope     HPI:    Callie Crocker is a 72 y.o. old male Turkmen-speaking male that had the sudden onset of lightheadedness, dizziness and nausea with vomiting and a witnessed syncopal event outside of a store where he was buying earbuds.  This was witnessed by family.  He vomited again in route per the emergency medical service.  He was incontinent of urine.  He complained of a severe headache.  In the emergency room acute subarachnoid hemorrhage was noted on CT scan.  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  Cerebral angiogram showed multiple intracranial aneurysms (5) and he is now scheduled for open surgical exploration and clipping tomorrow.  He has been admitted to the neuro ICU.       Medical History  There are no active non-hospital problems to display for this patient.    History reviewed. No pertinent past medical history.       Surgical History  He  has no past surgical history on file.      Social History  Reviewed, and   Social History     Social History Narrative    He is mainly Turkmen-speaking.  He does have grown children.      Allergies  No Known Allergies Family History  Reviewed, and No family history on file.        Prior to Admission Medications   Prescriptions Prior to Admission   Medication Sig Dispense Refill Last Dose     doxylamine (UNISON) 25 mg tablet Take 25 mg by mouth at bedtime as needed for sleep.   Unknown at Unknown time          Review of Systems:   A 12 point comprehensive review of systems was negative except as noted.    Physical Exam:  Temp:  [97.8  F (36.6  C)-98.4  F (36.9  C)] 97.8  F (36.6  C)  Heart Rate:  [] 100  Resp:  [4-22] 11  BP: (121-192)/(57-99) 132/60      General Appearance:   Was seen in the ER without benefit of an , complained of a headache and was awake   Head:    Normocephalic, without obvious abnormality, atraumatic   Ears:    Both external ears are normal   Nose:   Nares normal, septum midline, mucosa normal, no drainage or sinus tenderness   Throat:   Lips, mucosa, and tongue normal; teeth and gums normal   Lungs:     Clear to auscultation bilaterally, respirations unlabored   Chest Wall:    No tenderness or deformity    Heart:    Regular rate and rhythm, S1 and S2 normal, no murmur, rub or gallop   Abdomen:     Soft, non-tender, positive bowel sounds, no rebound,    guarding, masses or organomegaly palpable    Extremities:   Extremities normal, atraumatic, no cyanosis or edema   Skin:   Skin color, texture, turgor normal, no rashes or lesions   Neurologic:    Moves all extremities and no facial asymmetry            Pertinent Labs  Potassium 4.2, kidney function normal, hemoglobin 15.2    Pertinent Radiology  Radiology Results: As above  EKG Results: Pending    Care and plan discussed with nursing staff.      sIabell Shah MD  Internal and Geriatric Medicine    Total time in the care of this patient was 75 minutes over 50% was in regard to counseling/coordination of care.    Much or all of the text in this note was generated through the use of Dragon Dictate voice-to-text software. Errors in spelling or words which seem out of context are unintentional. Sound  alike errors, in particular, may have escaped editing.

## 2021-06-13 NOTE — PROGRESS NOTES
DIABETES CARE  Chart Review due to pt on bg screening list for bg over 180mg/dL.    73YO Tongan-speaking male without listed DM diagnosis/history    BGs here:  Results for MAK ORTIZ (MRN [id number]) as of 10/30/2017 14:25   Ref. Range 10/30/2017 08:26   Glucose, POC Latest Units: mg/dL 151   Results for MAK ORTIZ (MRN [id number]) as of 10/30/2017 14:25   Ref. Range 10/29/2017 07:54 10/29/2017 12:23 10/29/2017 16:48 10/29/2017 20:12   Glucose, POC Latest Units: mg/dL 152 185 156 198   Results for MAK ORTIZ (MRN [id number]) as of 10/30/2017 14:25   Ref. Range 10/28/2017 08:06 10/28/2017 11:59 10/28/2017 16:45 10/28/2017 20:12   Glucose, POC Latest Units: mg/dL 172 138 201 203   Results for MAK ORTIZ (MRN [id number]) as of 10/30/2017 14:25   Ref. Range 10/21/2017 13:21 10/23/2017 04:53 10/24/2017 04:45 10/25/2017 11:59 10/26/2017 04:27 10/27/2017 04:11 10/28/2017 04:40 10/29/2017 05:44 10/30/2017 04:00   Glucose Latest Ref Range: 70 - 125 mg/dL 158 (H) 177 (H) 192 (H) 170 (H) 136 (H) 177 (H) 164 (H) 165 (H) 179 (H)     No A1C    Hgb is decreased, so the A1C may not accurate anyway.    Ordered:   Novolog for correction, sensitive scale TIDAC  And hs sensitive scale at hs    Social:  No medical insurance listed;  [facility] mentioned as PCP.    Assessment/Recommend:  No mention of diabetes or work-up in progress that writer can find.  Unsure if already addressed?  The lab glucoses alone could be used diagnostically if fasting glucoses.  Several POCT bgs over 180 as well.   The current acute illness makes it more difficult to assess, however.  The earlier diabetes can be diagnosed, the sooner it can be addressed, and hopefully prevent complications.  If not addressed here, recommend including the mention of elevated glucoses in discharge summary.    Thanks!  Emperatriz Simon RD, LD, CDE  [phone]

## 2021-06-13 NOTE — PROGRESS NOTES
I discussed the risks of the procedure, including but not limited to infection, bleeding requiring transfusion or other procedures, stroke, neurological injury with weakness, sensory deficits, cranial nerve deficits, cerebrospinal fluid leak, seizures,hemorrhage, all requiring further procedures with patient and2 sons and daughters.  I did emphasize the high risk of rebleed without intervention as well as significant risk of morbidity with surgical intervention. No guarantees were implied or promised. She wished to proceed. Informed consent was obtained both verbally and in writing.    Lisette Espinal MD, FAANS

## 2021-06-13 NOTE — ANESTHESIA PREPROCEDURE EVALUATION
Anesthesia Evaluation      Patient summary reviewed   No history of anesthetic complications     Airway   Mallampati: III  Neck ROM: full   Pulmonary - normal exam                          Cardiovascular   Exercise tolerance: < 4 METS  (+) hypertension (? compliance), ,     Rhythm: regular  Rate: normal,         Neuro/Psych      Comments: Insomnia  Hydrocephalus  Aneurysm        Endo/Other - negative ROS      GI/Hepatic/Renal - negative ROS           Dental    (+) poor dentition, upper dentures and lower dentures                         Anesthesia Plan  Planned anesthetic: general endotracheal  A line.  piv x2.  ponv ppx.  Glidescope.    ASA 4 - emergent   Induction: intravenous   Anesthetic plan and risks discussed with: patient and child/children  Anesthesia plan special considerations: antiemetics, arterial catheterization, IV therapy two IVs,   Post-op plan: routine recovery

## 2021-06-13 NOTE — PROGRESS NOTES
Critical Care Progress Note  10/25/2017   10:40 AM     Admit Date: 10/21/2017  ICU Day: 4   Code Status: full code      Problem List:   Principal Problem:    SAH (subarachnoid hemorrhage)  Active Problems:    Benign essential HTN    Hydrocephalus    Brain edema    Aneurysmal subarachnoid hemorrhage    Nontraumatic subarachnoid hemorrhage    Encephalopathy acute          Plan:   1. Tight blood pressure control; parameters set by Neurosurgery with Systolic goal 110 - 160. Using Cardene currently - less dose requirement today.   2. Ventric management per Neurosurgery. No weaning until bleed day #7 (today is day 4).   3. Pneumocephalus on CT scan being treated with 24-hours hyperoxygenation at 100% FiO2 via face mask - directed by Neurosurgery. Looks like this didn't start until 1500 yesterday.    4. Give additional dose Lasix 20mg IVP. Aiming for euvolemia to slightly volume up. Daily assessment for diuretic need.   5. Repeat imaging for any change in neuro exam.  6.  Nimodipine as ordered by Neurosurgery.  7. Daily TCDs  8. Follow Sodiums daily; aim for normonatremia.   9. Add low dose Precedex to control agitation; he becomes incredibly agitated and restless, trying to get out of bed, gets out of restraints, reaches for ventric, and is very difficult to redirect. Rather than risk giving him PRNs that could linger with sedative effects, will start low dose Precedex with goal RASS of 0 - +1. This should be easier to manage and has the benefit of being fast acting and short duration.       ICU Prophylaxis   Feeding: NPO   Pringle: yes - strict I/O   Analgesia/Sedation: as above- avoid sedating   DVT prophylaxis: SCDs  HOB > 30 degrees: Yes - must be maintained at 30degrees.    GI Proph: Protonix   Glycemic Control: SSI   Family updated: yes - at bedside    Dispo: ICU due to ongoing monitoring of ICP in patient with multiple intracranial aneurysms. High risk for acute deterioration and death.     Angella Robb,  [facility] Pulmonary/Critical Care    Total critical care time: 35 minutes  _______________________________________________________________    HPI: 72 year old gentleman with PMH of hypertension. He presented 10/21 after a witnessed syncopal event. He had sudden onset dizziness, light headedness, nausea and vomiting. He was taken to ER, CT scan showed acute subarachnoid hemorrhage centered within the basal cisterns; CTA revealed 4.6 x 3.4 mm anteriorly directed CONCEPCION aneurysm. Cerebral angiogram revealed multiple intracranial aneurysms. He underwent emergent left sided crani and clipping of left MCA, left anterior choroidal, left carotid terminus, left CONCEPCION; and placement of ventriculostomy by Dr. RENO Espinal. There remains one unsecured aneurysm on the right.   Post op he was taken to ICU for further monitoring and care.     Seen with aide of professional .     ROS: very confused - does admit to some headache and stomach upset.      Events over last 24-hours: confused and restless overnight - received a dose of seroquel.     Objective:     Vitals:    10/25/17 0715 10/25/17 0730 10/25/17 0745 10/25/17 0800   BP: 151/67 158/67 151/66 157/70   Patient Position:    Lying   Pulse: (!) 101 (!) 101 100 (!) 101   Resp: 16 15 15 15   Temp:       TempSrc:       SpO2: 100% 100% 100% 100%   Weight:       Height:           I/O:     Intake/Output Summary (Last 24 hours) at 10/25/17 1040  Last data filed at 10/25/17 0517   Gross per 24 hour   Intake             1920 ml   Output             2503 ml   Net             -583 ml     Wt Readings from Last 3 Encounters:   10/23/17 208 lb 12.4 oz (94.7 kg)      Weight change:     Physical Exam:  Gen: restless lying in bed, pulling at restraints.   HEENMT:PERRLA, extra ocular movement intact, sclera clear, anicteric and Crani cap in place; Ventric with clearer output  NEURO: moves all extremities   CARDIOVASCULAR: S1, S2 normal, no murmur, rub or gallop, regular rate  and rhythm  PULMONARY:Unlabored on Facemask; lungs are coarse throughout, no wheeze.   GASTROINTESTINAL: abdomen is much softer today - active bowel sounds.   INTEGUMENT: generalized edema  PSYCH: confused, lethargic, disoriented    Labs:   Results from last 7 days  Lab Units 10/25/17  0357 10/24/17  0445  10/21/17  1321   LN-SODIUM mmol/L 147* 141  142  < > 138   LN-POTASSIUM mmol/L 4.0 3.7  3.7  < > 4.1   LN-CHLORIDE mmol/L  --  117*  < > 106   LN-CO2 mmol/L  --  17*  < > 25   LN-BLOOD UREA NITROGEN mg/dL  --  28  < > 12   LN-CREATININE mg/dL  --  0.93  < > 0.79   LN-CALCIUM mg/dL  --  8.2*  < > 9.1   LN-PROTEIN TOTAL g/dL  --   --   --  7.4   LN-BILIRUBIN TOTAL mg/dL  --   --   --  0.8   LN-ALKALINE PHOSPHATASE U/L  --   --   --  110   LN-ALT (SGPT) U/L  --   --   --  24   LN-AST (SGOT) U/L  --   --   --  25   < > = values in this interval not displayed.      Results from last 7 days  Lab Units 10/24/17  1218   LN-WHITE BLOOD CELL COUNT thou/uL 14.3*   LN-HEMOGLOBIN g/dL 10.7*   LN-HEMATOCRIT % 31.1*   LN-PLATELET COUNT thou/uL 179       Micro: none this admission     Imaging: all imaging personalized reviewed   10/24 Head CT - 1.  Interval left-sided craniotomy with placement of three aneurysm clips.  2.  Fluid collection subjacent to the craniotomy and bifrontal pneumocephalus.  3.  Interval decreased subarachnoid hemorrhage and increased intraventricular hemorrhage.  4.  Left frontal approach ventriculostomy with decompressed ventricular system.  5.  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  6.  Mild mass effect with mild rightward shift of midline structures by 4 mm.    10/24 CXR - Mild pulmonary venous congestion. Trace bilateral pleural effusions. No pneumothorax. The osseous structures are intact. The cardiac mediastinal silhouette is within normal limits.      Angella Robb, [facility] Pulmonary/Critical Care

## 2021-06-13 NOTE — PROGRESS NOTES
NEUROLOGY PROGRESS NOTE     ASSESSMENT & PLAN   Hospital Day#: 15    Impression: Subarachnoid Hemorrhage/Status Post Clipping of Left-Sided Aneurysms.  Patient Appears Similar Clinically at This Time with Moving All Extremities and Following Simple Commands by Mimicry.  Note at Times Has Spit out Medications.  No Seizures.        Patient Active Problem List    Diagnosis Date Noted     Brain compression 10/31/2017     Hypernatremia 10/26/2017     Seizure-like activity 10/25/2017     Nontraumatic subarachnoid hemorrhage 10/24/2017     Encephalopathy acute 10/24/2017     Brain edema 10/23/2017     Aneurysmal subarachnoid hemorrhage 10/23/2017     Aneurysm 10/22/2017     SAH (subarachnoid hemorrhage) 10/21/2017     Benign essential HTN      Obstructive hydrocephalus      Past Medical History: Patient  has no past medical history on file.     SUBJECTIVE     Sodium stable at 137.     OBJECTIVE     Vital signs in last 24 hours  Temp:  [96.3  F (35.7  C)-97.6  F (36.4  C)] 96.3  F (35.7  C)  Heart Rate:  [57-72] 65  Resp:  [12-26] 17  BP: (114-156)/(59-77) 151/73    Weight:   186 lb 12.8 oz (84.7 kg)    I/O last 24 hours    Intake/Output Summary (Last 24 hours) at 11/05/17 0753  Last data filed at 11/05/17 0400   Gross per 24 hour   Intake              900 ml   Output                0 ml   Net              900 ml       Review of Systems   Unable due to language differences    General Physical Exam: Patient is alertVital signs were reviewed and are documented in EMR. Neurological Exam:  Patient alerts to voice.  Will look to the left and the right.  Will stick his tongue out in the midline to command.  Pupils are 3 mm and reactive.  Will squeeze both hands.  Will wiggle toes.  Babinski downgoing bilaterally.       DIAGNOSTIC STUDIES     Pertinent Radiology   Radiology Results: No results found.    Pertinent Labs   Lab Results: Personally reviewed  Recent Results (from the past 24 hour(s))   POCT Glucose    Collection Time:  11/04/17  8:43 AM   Result Value Ref Range    Glucose,  mg/dL   POCT Glucose    Collection Time: 11/04/17  1:02 PM   Result Value Ref Range    Glucose,  mg/dL   POCT Glucose    Collection Time: 11/04/17  5:10 PM   Result Value Ref Range    Glucose,  mg/dL   POCT Glucose    Collection Time: 11/04/17  8:57 PM   Result Value Ref Range    Glucose,  mg/dL   Basic Metabolic Panel    Collection Time: 11/05/17  4:57 AM   Result Value Ref Range    Sodium 137 136 - 145 mmol/L    Potassium 4.0 3.5 - 5.0 mmol/L    Chloride 110 (H) 98 - 107 mmol/L    CO2 17 (L) 22 - 31 mmol/L    Anion Gap, Calculation 10 5 - 18 mmol/L    Glucose 132 (H) 70 - 125 mg/dL    Calcium 9.3 8.5 - 10.5 mg/dL    BUN 21 8 - 28 mg/dL    Creatinine 0.74 0.70 - 1.30 mg/dL    GFR MDRD Af Amer >60 >60 mL/min/1.73m2    GFR MDRD Non Af Amer >60 >60 mL/min/1.73m2   HM2(CBC W/O DIFF)    Collection Time: 11/05/17  4:57 AM   Result Value Ref Range    WBC 5.0 4.0 - 11.0 thou/uL    RBC 4.06 (L) 4.40 - 6.20 mill/uL    Hemoglobin 12.4 (L) 14.0 - 18.0 g/dL    Hematocrit 36.7 (L) 40.0 - 54.0 %    MCV 90 80 - 100 fL    MCH 30.5 27.0 - 34.0 pg    MCHC 33.8 32.0 - 36.0 g/dL    RDW 14.6 (H) 11.0 - 14.5 %    Platelets 436 140 - 440 thou/uL    MPV 10.0 8.5 - 12.5 fL   Magnesium    Collection Time: 11/05/17  4:57 AM   Result Value Ref Range    Magnesium 2.2 1.8 - 2.6 mg/dL         HOSPITAL MEDICATIONS       influenza vacc high dose (age 65 or >) (PF) 2017-18  0.5 mL Intramuscular Prior to Discharge     insulin aspart (NovoLOG) injection   Subcutaneous TID with meals     insulin aspart (NovoLOG) injection   Subcutaneous QHS     labetalol  100 mg Oral TID     levETIRAcetam  1,000 mg Oral BID     lidocaine (XYLOCAINE) 1 % local injection  10 mL Infiltration Once     lisinopril  40 mg Oral DAILY     niMODipine  60 mg Oral Q4H    Or     niMODipine  60 mg Enteral Tube Q4H     pantoprazole  40 mg Oral QAM AC     polyethylene glycol  17 g Oral DAILY      QUEtiapine  12.5 mg Oral QHS     senna-docusate  1 tablet Oral BID        Total time spent for face to face visit, reviewing labs/imaging studies, counseling and coordination of care was 15 minutes.} More than 50% of this time was spent on counseling and coordination of care.    Marvin Jasmine MD  [facility]  Direct Secure Messaging: [email or web address]  Tel: [phone]

## 2021-06-13 NOTE — PROGRESS NOTES
Pulmonary/Critical Care  10/27/2017  8:41 AM     Patient seen on morning interdisciplinary rounds.   Need for ongoing bilateral soft wrist restraints assessed and confirmed.     Angella Robb, [facility] Pulmonary/Critical Care

## 2021-06-13 NOTE — ANESTHESIA POSTPROCEDURE EVALUATION
Patient: Callie Crocker  * No procedures listed *  Anesthesia type: general    Patient location: PACU  Last vitals:   Vitals:    10/21/17 1900   BP: 129/64   Pulse: (!) 103   Resp: 22   Temp:    SpO2: 95%     Post vital signs: stable  Level of consciousness: awake and responds to simple questions  Post-anesthesia pain: pain controlled  Post-anesthesia nausea and vomiting: no  Pulmonary: unassisted, return to baseline  Cardiovascular: stable and blood pressure at baseline  Hydration: adequate  Anesthetic events: no    QCDR Measures:  ASA# 11 - Elena-op Cardiac Arrest: ASA11B - Patient did NOT experience unanticipated cardiac arrest  ASA# 12 - Elena-op Mortality Rate: ASA12B - Patient did NOT die  ASA# 13 - PACU Re-Intubation Rate: ASA13B - Patient did NOT require a new airway mgmt  ASA# 10 - Composite Anes Safety: ASA10A - No serious adverse event    Additional Notes:  Pt to return to OR in AM for craniotomy

## 2021-06-13 NOTE — PROGRESS NOTES
"Critical Care Progress Note     Admit Date: 10/21/2017  ICU Day: 10     Code Status: full code    CC: SAH    HPI: 72 year old gentleman with PMH of hypertension. He presented 10/21/17 after a witnessed syncopal event. He had sudden onset dizziness, light headedness, nausea and vomiting. He was taken to ER, CT scan showed acute subarachnoid hemorrhage centered within the basal cisterns; CTA revealed 4.6 x 3.4 mm anteriorly directed CONCEPCION aneurysm. Cerebral angiogram revealed multiple intracranial aneurysms. He underwent emergent left sided crani and clipping of left MCA, left anterior choroidal, left carotid terminus, left CONCEPCION; and placement of ventriculostomy by Dr. RENO Espinal. There remains one unsecured aneurysm on the right. Post op he was taken to ICU for further monitoring and care.     Major events over the last 24 hours: ventric remains clamped     Subjective: up in chair, wife at bedside  Interpreting for him  He is in nad   ROS: denies ha or cp    Drips: None    Ventilator: Mechanical Ventilation Day: NA        Settings: NA    /68  Pulse 70  Temp 98.2  F (36.8  C) (Oral)   Resp 16  Ht 5' 8\" (1.727 m)  Wt 192 lb (87.1 kg)  SpO2 97%  BMI 29.19 kg/m2     I/O last 3 completed shifts:  In: 650 [P.O.:600; IV Piggyback:50]  Out: 2480 [Urine:2475; Drains:5]  Weight change: 4 lb 6.2 oz (1.991 kg)         EXAM:   Mental status: awake, up in chair ventric  clamped  HEENT: skull cap on, ventric in place, c lamped PERRL  Resp: cta non labored  Cardiovascular: RRR  Abdominal: soft nt + bs  Extremeties: no e/c/c  Neurology: WHITE  Other: ventric, kelley    Labs Personally reviewed: yes    Results from last 7 days  Lab Units 10/31/17  0502 10/30/17  0400 10/29/17  0544   LN-WHITE BLOOD CELL COUNT thou/uL 6.9 6.1 6.1   LN-HEMOGLOBIN g/dL 11.3* 11.3* 11.3*   LN-HEMATOCRIT % 32.6* 32.7* 33.0*   LN-PLATELET COUNT thou/uL 310 317 250       Results from last 7 days  Lab Units 10/31/17  0502 10/30/17  0400 10/29/17  0544 "   LN-SODIUM mmol/L 136 138 139   LN-POTASSIUM mmol/L 3.8 4.0 3.7   LN-CHLORIDE mmol/L 110* 111* 112*   LN-CO2 mmol/L 19* 17* 18*   LN-BLOOD UREA NITROGEN mg/dL 17 17 15   LN-CREATININE mg/dL 0.75 0.80 0.77   LN-CALCIUM mg/dL 8.7 8.0* 8.4*       last fever recorded on 10/28/17 100.6    Microbiology: UC NGTD  BC NGTD  BC  Imaging (all imaging is personally reviewed): yes    PCXR 10/24/17-Mild pulmonary venous congestion. Trace bilateral pleural effusions. No pneumothorax. The osseous structures are intact. The cardiac mediastinal silhouette is within normal limits.    Impression:  1. Aneurysmal subarachnoid hemorrhage   secondary to ruptured cerebral aneurysm s/p crani with multiple clips  2. Hydrocephalus    S/p EVD  3. Fevers   Now afebrile   Cultures neg to date   procal 0.15 on 10/28   cxr ok  4. Seizure like activity   5. Hypernatremia    Improving    6. Acute encephalopathy   7. Benign essential HTN    PLAN:   1. Goal SBP < 160 per neurosurgery-nimodipine,labetalol and lisinopril  2. Antibiotics stopped 10/30  3. Per Neurosurgery they will keep ventric clamped for now  4. Serial  TCDs   5. AED x 30 days per neurology   ICU DAILY CHECKLIST                           Can patient transfer out of ICU? no    FAST HUG:    Feeding:  Feeding: po    Pringle:Yes  Analgesia/Sedation:Not Indicated NA  Thromboembolic prophylaxis: yes; Mode:  SCDs  HOB>30:  Yes  Stress Ulcer Protocol Active: yes; Mode: PPI  Glycemic Control: Any glucose > 180 no; Mode of Insulin Therapy: Sliding Scale Insulin    INTUBATED:  Can patient have daily waking:  not applicable  Can patient have spontaneous breathing trial:  not applicable    Restraints? no    PHYSICAL THERAPY AND MOBILITY:  Can patient have PT and mobility trial: yes  Activity: Bed Rest    transfer/discharge plans: stays in ICU for ventric    Leilani Wood -[phone]  [facility] Pulmonary & Critical Care

## 2021-06-13 NOTE — PROGRESS NOTES
Speech Language/Pathology  Bedside Swallow Evaluation    Problem:  Patient Active Problem List   Diagnosis     SAH (subarachnoid hemorrhage)     Benign essential HTN     Hydrocephalus     Aneurysm     Brain edema     Aneurysmal subarachnoid hemorrhage       Onset date: 10/22/17  Reason for evaluation: SAH with 4 aneurysm clippings 10/22/17   Pertinent History: per H&P: Callie Crocker is a 72 y.o. old male Maltese-speaking male that had the sudden onset of lightheadedness, dizziness and nausea with vomiting and a witnessed syncopal event outside of a store where he was buying earbuds.  This was witnessed by family.  He vomited again in route per the emergency medical service.  He was incontinent of urine.  He complained of a severe headache.  In the emergency room acute subarachnoid hemorrhage was noted on CT scan.  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  Cerebral angiogram showed multiple intracranial aneurysms (5) and he is now scheduled for open surgical exploration and clipping tomorrow.  Current Diet: NPO except sips of water with meds  Baseline Diet: regular    Patient presents as lethargic and cooperative during this session. Pt was given dilaudid about an hour prior eval.  An  was provided by [employer], as pt is Maltese speaking.    Patient was given puree, thin and regular solid.    Dentition/Oral hygiene: dentures were placed prior po intake    Oral motor function was mildly impaired d/t (R) labial droop. This did not appear to affect speech or swallowing.    Bolus prep and oral control was not impaired. Mastication was not impaired  and the patient used rotary chewing.    Anterior-Posterior transit was not impaired.    Oral stasis did not occur with all textures trialed.    Pharyngeal Phase:    Puree: Patient trialed 2 ounces and presented with no s/s of aspiration. Initiation of swallow appeared timely. Hyolaryngeal movement appeared intact  .    Thin:Patient trialed 2 ounces by straw and presented with no s/s of aspiration. Initiation of swallow appeared timely. Hyolaryngeal movement appeared intact .    Regular solid: Patient trialed cracker and presented with no s/s of aspiration. Initiation of swallow appeared timely. Hyolaryngeal movement appeared intact         Assessment:    Patient presents with no signs and symptoms of aspiration with all textures trialed    Patient demonstrated no oral dysphagia.    Rehab potential is good based on evaluation results.    Recommendations:    Plan: Regular and thin liquids    Strategies: na    Speech therapy is not recommended at this time for dysphagia    Referrals: N/A    14 dysphagia minutes       Misti Weston MS, CCC-SLP

## 2021-06-13 NOTE — PROCEDURES
"PICC Line Insertion Procedure Note  Pt. Name: Callie Crocker  MRN:        [id number]    Procedure: Insertion of a  Triple Lumen  5 fr  Bard SOLO (valved) Power PICC, Lot number [id number]    Indications: IV access    Contraindications : none    Procedure Details   Patient identified with 2 identifiers and \"Time Out\" conducted.  .     Central line insertion bundle followed: hand hygeine performed prior to procedure, site cleansed with cholraprep, hat, mask, sterile gloves,sterile gown worn, patient draped with maximum barrier head to toe drape, sterile field maintained.    The vein was assessed and found to be compressible and of adequate size. 3 ml 1% Lidocaine administered sq to the insertion site. A 5 Fr PICC was inserted into the Basilic vein of the left arm with ultrasound guidance. 1 attempt(s) required to access vein.   Catheter threaded without difficulty. Good blood return noted.    Modified Seldinger Technique used for insertion.    The 8 sharps that are included in the PICC insertion kit were accounted for and disposed of in the sharps container prior to breakdown of the sterile field.    Catheter secured with Statlock, biopatch and Tegaderm dressing applied.    Findings:  Total catheter length  46 cm, with 1 cm exposed. Mid upper arm circumference is 34 cm. Catheter was flushed with 30 cc NS. Patient  tolerated procedure well.      CLABSI prevention brochure left at bedside.    Patient's primary RN notified PICC is ready for use.    Comments:          Maryellen Dickinson, RN    [facility]  [phone]      "

## 2021-06-13 NOTE — PROGRESS NOTES
Ventric is out.  Sleepy, but will move extremities when asked.  I can't understand his murmured speech.  Isac Pérez

## 2021-06-13 NOTE — PROGRESS NOTES
NEUROLOGY PROGRESS NOTE     ASSESSMENT & PLAN   Hospital Day#: 13    Fluctuating mental status ; SAH S/P left craniotomy & clipping of multiple intracranial aneurysm    EEG shows nonspecific slowing.  No sharp activity noted.    Previously transcranial Doppler showed questionable basilar vasospasm.  Repeat transcranial Doppler pending    Continue Keppra, Keppra level is therapeutic at 16.9    Neurology Discharge Planning :  TBD    Patient Active Problem List    Diagnosis Date Noted     Brain compression 10/31/2017     Hypernatremia 10/26/2017     Seizure-like activity 10/25/2017     Nontraumatic subarachnoid hemorrhage 10/24/2017     Encephalopathy acute 10/24/2017     Brain edema 10/23/2017     Aneurysmal subarachnoid hemorrhage 10/23/2017     Aneurysm 10/22/2017     SAH (subarachnoid hemorrhage) 10/21/2017     Benign essential HTN      Obstructive hydrocephalus      Past Medical History: Patient  has no past medical history on file.     SUBJECTIVE     Neuro status unchanged.  Patient continues to have fluctuating neuro status at times more alert     OBJECTIVE     Vital signs in last 24 hours  Temp:  [97.5  F (36.4  C)-98.2  F (36.8  C)] 98  F (36.7  C)  Heart Rate:  [58-77] 60  Resp:  [12-29] 13  BP: ()/(50-91) 121/61    Weight:   186 lb 12.8 oz (84.7 kg)    I/O last 24 hours    Intake/Output Summary (Last 24 hours) at 11/03/17 0733  Last data filed at 11/02/17 2200   Gross per 24 hour   Intake              780 ml   Output                0 ml   Net              780 ml     Review of Systems   Pertinent items are noted in HPI.    General Physical Exam: Patient is alert and oriented x 1. Vital signs were reviewed and are documented in EMR. No Carotid bruit, thyromegaly, JVD or lymphadenopathy noted.  Neurological Exam:  Patient alert and oriented cranial nerves II through XII intact moves all 4 extremities reflexes 1+ toes downgoing.  Tone normal.  Sensation intact.  Gait testing was deferred     DIAGNOSTIC  STUDIES     Pertinent Radiology   Radiology Results: Personally reviewed image/s and Personally reviewed impression/s    CT  1.  Left frontal approach ventriculostomy catheter is stable in position. The lateral and third ventricles are subtly more prominent compared to the 10/24/2017 study.    2.  Persistent intraventricular and subarachnoid hemorrhage has diminished in the interim compared to the prior study.    3.  Previous left frontal craniotomy with aneurysm clips. The elliptical collection deep to the craniotomy site has mildly diminished in caliber    4.  Relatively stable mild left to right midline shift.    5.  Evolutionary ischemic changes within the left frontal lobe and basal ganglia. No definite new hemorrhage or new infarct.    EEG  Diffusely slow in theta and delta range.  No sharp activity seen to suggest seizures    TCD  Transcranial Doppler reveals probable mild basilar artery vasospasm.    Pertinent Labs   Lab Results: personally reviewed.   Recent Results (from the past 24 hour(s))   POCT Glucose    Collection Time: 11/02/17  7:42 AM   Result Value Ref Range    Glucose,  mg/dL   Levetiracetam [Keppra ]    Collection Time: 11/02/17  8:18 AM   Result Value Ref Range    Levetiracetam 16.9 6.0 - 46.0 ug/mL   POCT Glucose    Collection Time: 11/02/17 11:52 AM   Result Value Ref Range    Glucose,  mg/dL   POCT Glucose    Collection Time: 11/02/17  6:54 PM   Result Value Ref Range    Glucose,  mg/dL   POCT Glucose    Collection Time: 11/02/17  8:44 PM   Result Value Ref Range    Glucose,  mg/dL   Basic Metabolic Panel    Collection Time: 11/03/17  4:44 AM   Result Value Ref Range    Sodium 136 136 - 145 mmol/L    Potassium 3.9 3.5 - 5.0 mmol/L    Chloride 110 (H) 98 - 107 mmol/L    CO2 18 (L) 22 - 31 mmol/L    Anion Gap, Calculation 8 5 - 18 mmol/L    Glucose 140 (H) 70 - 125 mg/dL    Calcium 9.2 8.5 - 10.5 mg/dL    BUN 19 8 - 28 mg/dL    Creatinine 0.75 0.70 - 1.30 mg/dL     GFR MDRD Af Amer >60 >60 mL/min/1.73m2    GFR MDRD Non Af Amer >60 >60 mL/min/1.73m2   HM2(CBC W/O DIFF)    Collection Time: 11/03/17  4:44 AM   Result Value Ref Range    WBC 5.2 4.0 - 11.0 thou/uL    RBC 3.93 (L) 4.40 - 6.20 mill/uL    Hemoglobin 12.0 (L) 14.0 - 18.0 g/dL    Hematocrit 35.6 (L) 40.0 - 54.0 %    MCV 91 80 - 100 fL    MCH 30.5 27.0 - 34.0 pg    MCHC 33.7 32.0 - 36.0 g/dL    RDW 14.5 11.0 - 14.5 %    Platelets 395 140 - 440 thou/uL    MPV 10.4 8.5 - 12.5 fL   Magnesium    Collection Time: 11/03/17  4:44 AM   Result Value Ref Range    Magnesium 2.2 1.8 - 2.6 mg/dL         HOSPITAL MEDICATIONS       influenza vacc high dose (age 65 or >) (PF) 2017-18  0.5 mL Intramuscular Prior to Discharge     insulin aspart (NovoLOG) injection   Subcutaneous TID with meals     insulin aspart (NovoLOG) injection   Subcutaneous QHS     labetalol  100 mg Oral TID     levETIRAcetam  1,000 mg Oral BID     lidocaine (XYLOCAINE) 1 % local injection  10 mL Infiltration Once     lisinopril  40 mg Oral DAILY     niMODipine  60 mg Oral Q4H    Or     niMODipine  60 mg Enteral Tube Q4H     pantoprazole  40 mg Oral QAM AC     polyethylene glycol  17 g Oral DAILY     senna-docusate  1 tablet Oral BID        Total time spent for face to face visit, reviewing labs/imaging studies, counseling and coordination of care was: 30 Minutes More than 50% of this time was spent on counseling and coordination of care.    Cal Mcduffie MD  [facility]  Direct Secure Messaging: [email or web address]  Tel: [phone]    This note was dictated using voice recognition software.  Any grammatical or context distortions are unintentional and inherent to the software.

## 2021-06-13 NOTE — PROGRESS NOTES
Critical Care Progress Note  10/28/2017   11:28 AM      Admit Date: 10/21/2017  ICU Day: 7  Code Status: full code      Problem List:   Principal Problem:    SAH (subarachnoid hemorrhage)  Active Problems:    Benign essential HTN    Hydrocephalus    Brain edema    Aneurysmal subarachnoid hemorrhage    Nontraumatic subarachnoid hemorrhage    Encephalopathy acute    Seizure-like activity    Hypernatremia          Plan:   1. Tight blood pressure control; parameters set by Neurosurgery with Systolic goal 110 - 160. Currently off cardene; receiving PRNs. Increase lisinopril to 40 mg daily and labetalol to 100 mg TID.   2. Ventric management per Neurosurgery. No weaning until bleed day #7 (today is day 6).  Plan is to raise the ventriculostomy today and start weaning.  3. Continue  D5 1/2NS. Autodiuresing; watch sodium, now normalized.     4. Repeat imaging for any change in neuro exam.  5. Nimodipine as ordered by Neurosurgery.  6. Daily TCDs  7. Continue Zosyn for now and likely complete 7-10 days, vancomycin was discontinued    ICU Prophylaxis   Feeding: as above  Pringle: yes - strict I/O   Analgesia/Sedation: prn  DVT prophylaxis: SCDs  HOB > 30 degrees: Yes - must be maintained at 30degrees.    GI Proph: Protonix   Glycemic Control: SSI   Family updated: yes - at bedside    Dispo: ICU due to ongoing monitoring of ICP in patient with multiple intracranial aneurysms. High risk for acute deterioration and death.     Arjun Bourne, [facility] Pulmonary/Critical Care    Total critical care time: 30 minutes  _______________________________________________________________    HPI: 72 year old gentleman with PMH of hypertension. He presented 10/21 after a witnessed syncopal event. He had sudden onset dizziness, light headedness, nausea and vomiting. He was taken to ER, CT scan showed acute subarachnoid hemorrhage centered within the basal cisterns; CTA revealed 4.6 x 3.4 mm anteriorly directed CONCEPCION aneurysm. Cerebral  angiogram revealed multiple intracranial aneurysms. He underwent emergent left sided crani and clipping of left MCA, left anterior choroidal, left carotid terminus, left CONCEPCION; and placement of ventriculostomy by Dr. RENO Espinal. There remains one unsecured aneurysm on the right.   Post op he was taken to ICU for further monitoring and care.     No reported pain today.  No significant fevers overnight.  Mental status remains unchanged.  Is not on any infusions and has less agitation.    ROS: unable to obtain - patient very drowsy.      Events over last 24-hours: no acute events overnight.     Objective:     Vitals:    10/28/17 0630 10/28/17 0700 10/28/17 0800 10/28/17 0900   BP: 160/71 152/74 165/80 175/77   Patient Position:       Pulse: 69 69 76 67   Resp: 19 16 28 11   Temp:       TempSrc:       SpO2:  98% 95% 99%   Weight:       Height:           I/O:     Intake/Output Summary (Last 24 hours) at 10/28/17 1003  Last data filed at 10/28/17 0900   Gross per 24 hour   Intake             1289 ml   Output             3468 ml   Net            -2179 ml     Wt Readings from Last 3 Encounters:   10/28/17 217 lb 13 oz (98.8 kg)      Weight change: 12 lb 2 oz (5.5 kg)    Physical Exam:  Gen:  Drowsy (just received Dilaudid).   HEENMT:PERRLA, extra ocular movement intact, sclera clear, anicteric and Crani cap in place; Ventric with clearer output. Right facial droop.   NEURO: moves all extremities, right is weaker. More difficult neuro exam presently due to narcotic.   CARDIOVASCULAR: S1, S2 normal, no murmur, rub or gallop, regular rate and rhythm  PULMONARY:Unlabored on RA; lungs are clearer today. No wheeze.    GASTROINTESTINAL: abdomen is soft - active bowel sounds.   INTEGUMENT: generalized edema    Labs:   Results from last 7 days  Lab Units 10/28/17  0440  10/21/17  1321   LN-SODIUM mmol/L 141  < > 138   LN-POTASSIUM mmol/L 3.8  < > 4.1   LN-CHLORIDE mmol/L 113*  < > 106   LN-CO2 mmol/L 23  < > 25   LN-BLOOD UREA  NITROGEN mg/dL 10  < > 12   LN-CREATININE mg/dL 0.71  < > 0.79   LN-CALCIUM mg/dL 8.3*  < > 9.1   LN-PROTEIN TOTAL g/dL  --   --  7.4   LN-BILIRUBIN TOTAL mg/dL  --   --  0.8   LN-ALKALINE PHOSPHATASE U/L  --   --  110   LN-ALT (SGPT) U/L  --   --  24   LN-AST (SGOT) U/L  --   --  25   < > = values in this interval not displayed.      Results from last 7 days  Lab Units 10/28/17  0440   LN-WHITE BLOOD CELL COUNT thou/uL 6.1   LN-HEMOGLOBIN g/dL 10.1*   LN-HEMATOCRIT % 29.4*   LN-PLATELET COUNT thou/uL 199       Micro: none this admission     Imaging: all imaging personalized reviewed   10/24 Head CT - 1.  Interval left-sided craniotomy with placement of three aneurysm clips.  2.  Fluid collection subjacent to the craniotomy and bifrontal pneumocephalus.  3.  Interval decreased subarachnoid hemorrhage and increased intraventricular hemorrhage.  4.  Left frontal approach ventriculostomy with decompressed ventricular system.  5.  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  6.  Mild mass effect with mild rightward shift of midline structures by 4 mm.    10/24 CXR - Mild pulmonary venous congestion. Trace bilateral pleural effusions. No pneumothorax. The osseous structures are intact. The cardiac mediastinal silhouette is within normal limits.

## 2021-06-13 NOTE — PROCEDURES
NEUROSURGERY PROCEDURE NOTE:    Date of procedure:  11/1/2017    Pre procedure diagnosis: hydrocephalus  Post procedure diagnosis: hydrocephalus    Procedure: EVD removal    Indication:  Complete therapy    Description of procedure:  Patient premedicated with none.  Removed dressing, skin prepped with Betadine.  Using sterile technique, draped with sterile towels, skin infiltrated with 6 cc of 1% Lidocaine.  Drain removed without difficulty, tip intact.   1 figure eight suture placed.  Unable to assess leaking with valsalva after suture placed. He was weakly following commands.  Patient tolerated procedure well.    Complications: none    Findings:  Successful drain removal        Khalida Swanson NP  [facility] Neurosurgery

## 2021-06-13 NOTE — PROGRESS NOTES
Patient is pending for Emergency MA with request for [facility] to expedite. Message left for [facility] supervisor requesting again that application is expedited. If/when EMA comes through, will request for EMA Care Plan to be expedited (which is needed for possible coverage for anything outside the emergent hospital stay). Raghav, SIOMARA [phone]

## 2021-06-13 NOTE — PROGRESS NOTES
NEUROSURGERY POST-OP NOTE:    ASSESSMENT:      Callie Crocker is POD # 4, Bleed Day #5 status post  LEFT SIDED CRANIOTOMY AND CLIPPING OF MULTIPLE INTRACRANIAL ANEURYSMS (left MCA, Left anterior choroidal, left carotid terminus, left CONCEPCION),  AND PLACEMENT OF VENTRICULOSTOMY  by Dr. Lisette Espinal. Complete occlusion of left sided clipped aneurysms. Left MCA aneurysm with clot present, likely to be cause of SAH. Patient still has one unsecured aneurysm on the right.     Patient very alert and interactive this morning, still with confusion. Still agitated overnight requiring intermittent doses of precedex to control agitation and blood pressure.     Febrile overnight, urine culture pending but UA with bacteria. White count has normalized, patient on zosyn and vancomycin.     TCDs today without evidence of vasospasm.  - started on D5 1/2NS, switched sodium checks to every 6 hours  ICPs < 10  Son in room, updated with  at this time   Mild blood loss anemia - hemoglobin relatively stable, likely not actively bleeding.      PLAN:       Patient Active Problem List   Diagnosis     SAH (subarachnoid hemorrhage)     Benign essential HTN  SBP Goal <160 >110.         Hydrocephalus  -Maintain Ventric at 10 cm, will plan to try and wean this weekend   -Okay to liberate to q2 hour neuros, increase to Q1 if issues  -Elevate HOB at least or higher 30 degrees  -Precedex managed by Intensivist. No sedating meds unless discussed w Neurosurgery   Track TCD and exam closely for sx/s of vasospasms     Aneurysm     Brain edema  Encephalopathy-Precedex w low RASS of 0-1       Aneurysmal subarachnoid hemorrhage  -Continue TCDs day 3-7, monitor clinically for s/sx of vasospasm such as headache/stroke symptoms  -Goal normal sodium, not currently on sodium supplementation. Daily Sodiums  -Would like to see patient euvolemia or positive volume status. Strict I/Os. He's over 4L up in volume since admission   -Clamp ventric  "for short periods and progress activity    -Continue kelley to monitor I&O   -Febrile- continue IV Zosyn and Vancomycin, management per intensivist team  -Seizure like activity: Keppra 500mg BID x 30 days  -Blood loss anemia, considerations for transfusion if < 8.0              Discharge Recommendations/Plan:  Barriers to Discharge: yes, aneurysmal SAH with ventriculostomy placement.     Follow Up Plan: Right unsecured aneurysm will need to be addressed after this event, possible follow up with IR vs surveillance monitoring. Plan TBD per Dr. Lisette Espinal            HPI: Callie Crocker is status post LEFT SIDED CRANIOTOMY AND CLIPPING OF MULTIPLE INTRACRANIAL ANEURYSMS, INTRA-OP ANGIOS AND PLACEMENT OF VENTRICULOSTOMY  by Dr. Lisette Espinal. He is 72 year old male presented after sudden onset of lightheadedness, dizziness, nausea and vomiting and syncopal event witnessed by his son. CT in the ED revealed subarachnoid hemorrhage, CTA 4.6 x 3.4 mm anteriorly directed CONCEPCION aneurysm. 4V revealed multiple aneurysms as detailed below. He was taken to the OR for clipping with Dr. Espinal. All four of the left sided aneurysms were clipped and completely occluded, the right sided aneurysm remains unsecured.     10/23- Became agitated overnight (into 10/24), concern for alcohol withdrawal, necessitated PRNs for agitation. Repeat head CT with pneumocephalus left frontal, interval decrease in size of SAH, ventricular system mildly decreased hydrocephalus, midline shift. Subacute infarct noted, not unexpected due to approach in surgery.  10/24- Coffee ground emesis, started on protonix. Repeat head CT reveals interval decrease in SAH, increased IVH, infarcts consistent with surgical intervention  10/26- More alert today, sodiums elevated, white count normalized, awaiting urine culture     SUBJECTIVE:  Mild headache, mild stomach upset, will not say where we are, says no to hospital, will only say that he is in \"[facility] " "place.\"      OBJECTIVE:  Vital signs in last 24 hours  Temp:  [98.3  F (36.8  C)-101.7  F (38.7  C)] 98.3  F (36.8  C)  Heart Rate:  [] 97  Resp:  [7-32] 7  BP: (130-169)/(50-72) 154/66  Body mass index is 31.64 kg/(m^2).    Mental Status: alert, oriented to person, not to place, time or situation. Speech is clear.  Cranial Nerves: PERRL, EOMI, face symmetrical, tongue midline and moves freely side to side, shoulder shrug equal. No pronator drift.  Following commands, confused.   Moves all extremities freely and with good strength.     Incision: Incision was approximated with staples and covered with kerliex dressing.  The incision is clean and dry  Small area of soft, seroma-like fluid collection just anterior to the incision in front of the ear.   Drain: Ventric patent, verified, good wave form --blood tinged. output  98 cc output last 24 hours       Pertinent Labs   Lab Results:   Results for MAK ORTIZ (MRN [id number]) as of 10/26/2017 11:10   10/26/2017 04:27   Sodium 150 (H)   Potassium 4.1   Chloride 127 (H)   CO2 19 (L)   Anion Gap, Calculation 4 (L)   BUN 21   Creatinine 0.84   GFR MDRD Af Amer >60   GFR MDRD Non Af Amer >60   Calcium 8.2 (L)   Magnesium 2.2   Glucose 136 (H)   WBC 7.6   RBC 3.23 (L)   Hemoglobin 9.8 (L)   Hematocrit 29.3 (L)   MCV 91   MCH 30.3   MCHC 33.4   RDW 14.6 (H)   Platelets 178   MPV 10.2       Pertinent Radiology Radiology Results:  None new.     Flori Zamora [facility] Neurosurgery  O: [phone]  P: [phone]          "

## 2021-06-13 NOTE — PROGRESS NOTES
Speech Language/Pathology  Speech Therapy Daily Progress Note    Patient presents as verbose, tangential, cooperative, awake and confused during this session.  An  was present.    Objective  Pt needed to be fed. I undid restraint but required assist with PO. Pt readily took bites and sips when they were presented to him. Pt was upright in a chair.  Pt consumed 10oz thin liquids via cup and straw, single and multiple swallows and did not present with any s/s aspiration. Pt was noted to have occasional loss of liquids from right side of lip and double swallow if I presented bolus from a cup. This was never noted with sips from the straw. Swallow appeared timely, hyolaryngeal elevation appeared WNL's, no change in vocal quality and no cough or throat clear was noted.  Puree 3oz was also WNL's.  Pt was noted to talk and swallow sooner than would be appropriate with increased texture trial of small bites of tay cracker in applesauce. Mild oral stasis noted with increased texture. Pt demonstrated interest in eating today which was not observed yesterday.     Memory, Orientation, and Amnesia Test (MOAT) was administered today.  Pt scored 24/100 which is severe impairment.  Pt presents as confused, tangential, verbose and confabulatory throughout the session.  Pt thought that I was a friend of his.       Assessment  Pt's swallow appears more consistent with BSS assessment done with speech therapy 10/23/2017. At that time pt was placed on regular with thin. Pt demonstrated s/s dysphagia and aspiration with thin and nectar thick liquids yesterday. Pt's dysphagia appears to fluctuate due to confusion and for this reason pt will require 1:1 supervision and assist with all PO. Pt must also sit upright and be alert and attending to PO.   Staff should alert SLP to any concerns with diet.      Plan/Recommendations  Initiate diet NDD1 with thin with straw.  Pt requires 1:1 assist and supervision. Pt must be alert,  awake and upright 90 degrees and attending to PO.    current goals remain appropriate    10 speech/language minutes and 20 dysphagia minutes     Sophia Evans MS, CCC-SLP

## 2021-06-13 NOTE — PROGRESS NOTES
CT reviewed hypodensity in left medial frontal lobe due to partial resection of gyrus rectus for exposure of ACOMM aneurysm. The left caudate hypodensity likely due to infarct from recurrent artery of liz. All vessels were patent on inraop angiogram. We'll monitor for vasospasm clinically and with TCD's.     Lisette Espinal MD, FAANS

## 2021-06-13 NOTE — PROGRESS NOTES
NEUROLOGY PROGRESS NOTE     ASSESSMENT & PLAN   Hospital Day#: 12    Fluctuating mental status ; SAH S/P left craniotomy & clipping of multiple intracranial aneurysm    EEG shows diffuse slowing but no sharp activity arrhythmic discharges seen to suggest seizures    Transcranial Doppler suggests basilar artery vasospasm, I will defer repeat angiogram to neurosurgery service    Continue Keppra, check Keppra level    Okay to discontinue long-term EEG monitoring    Neurology Discharge Planning :  TBD    Patient Active Problem List    Diagnosis Date Noted     Brain compression 10/31/2017     Hypernatremia 10/26/2017     Seizure-like activity 10/25/2017     Nontraumatic subarachnoid hemorrhage 10/24/2017     Encephalopathy acute 10/24/2017     Brain edema 10/23/2017     Aneurysmal subarachnoid hemorrhage 10/23/2017     Aneurysm 10/22/2017     SAH (subarachnoid hemorrhage) 10/21/2017     Benign essential HTN      Obstructive hydrocephalus      Past Medical History: Patient  has no past medical history on file.     SUBJECTIVE     No change in neuro status EEG does not show any sharp activity to suggest seizures     OBJECTIVE     Vital signs in last 24 hours  Temp:  [97.9  F (36.6  C)-98.9  F (37.2  C)] 98.4  F (36.9  C)  Heart Rate:  [63-84] 71  Resp:  [13-31] 14  BP: (107-157)/(54-79) 118/63    Weight:   186 lb 12.8 oz (84.7 kg)    I/O last 24 hours    Intake/Output Summary (Last 24 hours) at 11/02/17 0713  Last data filed at 11/02/17 0500   Gross per 24 hour   Intake              400 ml   Output              200 ml   Net              200 ml     Review of Systems   Pertinent items are noted in HPI.    General Physical Exam: Patient is alert and oriented x 1. Vital signs were reviewed and are documented in EMR. No Carotid bruit, thyromegaly, JVD or lymphadenopathy noted.  Neurological Exam:  Patient alert and oriented cranial nerves II through XII intact moves all 4 extremities reflexes 1+ toes downgoing.  Tone normal.   Sensation intact.  Gait testing was deferred     DIAGNOSTIC STUDIES     Pertinent Radiology   Radiology Results: Personally reviewed image/s and Personally reviewed impression/s    CT  1.  Left frontal approach ventriculostomy catheter is stable in position. The lateral and third ventricles are subtly more prominent compared to the 10/24/2017 study.    2.  Persistent intraventricular and subarachnoid hemorrhage has diminished in the interim compared to the prior study.    3.  Previous left frontal craniotomy with aneurysm clips. The elliptical collection deep to the craniotomy site has mildly diminished in caliber    4.  Relatively stable mild left to right midline shift.    5.  Evolutionary ischemic changes within the left frontal lobe and basal ganglia. No definite new hemorrhage or new infarct.    EEG  Diffusely slow in theta and delta range.  No sharp activity seen to suggest seizures    TCD  Transcranial Doppler reveals probable mild basilar artery vasospasm.    Pertinent Labs   Lab Results: personally reviewed.   Recent Results (from the past 24 hour(s))   POCT Glucose    Collection Time: 11/01/17  8:09 AM   Result Value Ref Range    Glucose,  mg/dL   POCT Glucose    Collection Time: 11/01/17 12:33 PM   Result Value Ref Range    Glucose,  mg/dL   Protein, CSF    Collection Time: 11/01/17  5:13 PM   Result Value Ref Range    Protein, CSF 42 15 - 45 mg/dL   CSF, Cell Count    Collection Time: 11/01/17  5:13 PM   Result Value Ref Range    Color, CSF Xanthochromic (!) Colorless    Appearance, CSF Cloudy (!) Clear    RBC, CSF 0 <2 /ul    WBC, CSF 1 <6 /ul    Tube Number, CSF      Volume, CSF 5.0 ml   Glucose, CSF    Collection Time: 11/01/17  5:13 PM   Result Value Ref Range    Glucose, CSF 92 (H) 40 - 75 mg/dL   Stat Gram Stain    Collection Time: 11/01/17  5:13 PM   Result Value Ref Range    Gram Stain Result No organisms seen    POCT Glucose    Collection Time: 11/01/17  6:19 PM   Result Value Ref  Range    Glucose,  mg/dL   POCT Glucose    Collection Time: 11/01/17  9:17 PM   Result Value Ref Range    Glucose,  mg/dL   Basic Metabolic Panel    Collection Time: 11/02/17  4:22 AM   Result Value Ref Range    Sodium 136 136 - 145 mmol/L    Potassium 4.0 3.5 - 5.0 mmol/L    Chloride 108 (H) 98 - 107 mmol/L    CO2 18 (L) 22 - 31 mmol/L    Anion Gap, Calculation 10 5 - 18 mmol/L    Glucose 157 (H) 70 - 125 mg/dL    Calcium 9.4 8.5 - 10.5 mg/dL    BUN 19 8 - 28 mg/dL    Creatinine 0.81 0.70 - 1.30 mg/dL    GFR MDRD Af Amer >60 >60 mL/min/1.73m2    GFR MDRD Non Af Amer >60 >60 mL/min/1.73m2   HM2(CBC W/O DIFF)    Collection Time: 11/02/17  4:22 AM   Result Value Ref Range    WBC 6.6 4.0 - 11.0 thou/uL    RBC 3.93 (L) 4.40 - 6.20 mill/uL    Hemoglobin 12.1 (L) 14.0 - 18.0 g/dL    Hematocrit 35.3 (L) 40.0 - 54.0 %    MCV 90 80 - 100 fL    MCH 30.8 27.0 - 34.0 pg    MCHC 34.3 32.0 - 36.0 g/dL    RDW 14.6 (H) 11.0 - 14.5 %    Platelets 402 140 - 440 thou/uL    MPV 10.3 8.5 - 12.5 fL   Magnesium    Collection Time: 11/02/17  4:22 AM   Result Value Ref Range    Magnesium 2.0 1.8 - 2.6 mg/dL         HOSPITAL MEDICATIONS       influenza vacc high dose (age 65 or >) (PF) 2017-18  0.5 mL Intramuscular Prior to Discharge     insulin aspart (NovoLOG) injection   Subcutaneous TID with meals     insulin aspart (NovoLOG) injection   Subcutaneous QHS     labetalol  100 mg Oral TID     levETIRAcetam  1,000 mg Oral BID     lidocaine (XYLOCAINE) 1 % local injection  10 mL Infiltration Once     lisinopril  40 mg Oral DAILY     niMODipine  60 mg Oral Q4H    Or     niMODipine  60 mg Enteral Tube Q4H     pantoprazole  40 mg Oral QAM AC     polyethylene glycol  17 g Oral DAILY     senna-docusate  1 tablet Oral BID        Total time spent for face to face visit, reviewing labs/imaging studies, counseling and coordination of care was: 30 Minutes More than 50% of this time was spent on counseling and coordination of  care.    Cal Mcduffie MD  [facility]  Direct Secure Messaging: [email or web address]  Tel: [phone]    This note was dictated using voice recognition software.  Any grammatical or context distortions are unintentional and inherent to the software.

## 2021-06-13 NOTE — PROGRESS NOTES
Assessment is hard to complete d/t language barrier. Interpreters are also having a hard time interpreting d/t confused conversation. Pt responded well to using bed  and following those commands. Pt also refused most medications today. Spit out second dose of nimodipine this afternoon. Also, only took half dose of Keppra this am. Continue to try and explain importance of medication with .

## 2021-06-13 NOTE — PROGRESS NOTES
NEUROLOGY PROGRESS NOTE     ASSESSMENT & PLAN   Hospital Day#: 3    SAH S/P clipping left MCA, left anterior choroidal, left carotid terminus and left CONCEPCION aneurysm and ventriculostomy placement    Subarachnoid hemorrhage protocol/management per neurosurgery    Patient on nimodipine 60 mg every 4 hours time 21 days    Keppra 500 mg twice a day    Transcranial Dopplers to watch for vasospasm    Neurology Discharge Planning :  TBD    Patient Active Problem List    Diagnosis Date Noted     Brain edema 10/23/2017     Aneurysmal subarachnoid hemorrhage 10/23/2017     Aneurysm 10/22/2017     SAH (subarachnoid hemorrhage) 10/21/2017     Benign essential HTN      Hydrocephalus      Past Medical History: Patient  has no past medical history on file.     SUBJECTIVE     Patient confused and disoriented.  Speech is normal     OBJECTIVE     Vital signs in last 24 hours  Temp:  [99.2  F (37.3  C)-100.5  F (38.1  C)] 99.2  F (37.3  C)  Heart Rate:  [74-96] 78  Resp:  [12-46] 21  BP: (124-162)/(52-63) 162/52  Arterial Line BP: ()/() 146/49    Weight:   208 lb 12.4 oz (94.7 kg)    I/O last 24 hours    Intake/Output Summary (Last 24 hours) at 10/24/17 0731  Last data filed at 10/24/17 0700   Gross per 24 hour   Intake          4842.84 ml   Output             1630 ml   Net          3212.84 ml       Review of Systems   Pertinent items are noted in HPI.    General Physical Exam: Patient is alert and oriented x 1. Vital signs were reviewed and are documented in EMR. Neck was supple, no Carotid bruit, thyromegaly, JVD or lymphadenopathy noted.  Neurological Exam:  Patient is alert and oriented follows commands intermittently at times by mimicking.  Pupil equal round and reactive face symmetrical patient has a right pronator drift left side seems normal.  Reflexes 2+ with equivocal toes.  No dysmetria noted on finger-nose testing     DIAGNOSTIC STUDIES     Pertinent Radiology   Radiology Results: Personally reviewed image/s and  Personally reviewed impression/s    CT  10/23/17  1.  Patient status post a left frontal to left temporal craniotomy for clipping of multiple cerebral aneurysms as stated above.  2.  There is pneumocephalus seen in the frontal regions anteriorly and also free air and free fluid is noted within the subgaleal region of the scalp in the left frontal region. There is a small extra-axial collection noted.  3.  There is an interval decrease in the subarachnoid hemorrhage seen previously.  4.  The ventricular system size has decreased mildly in the interval. There is now intraventricular hemorrhage seen in the dependent portions of the lateral ventricles. There is a left frontal ventricular catheter in the midportion of the right lateral   ventricle. There is a slight midline shift to the right side anteriorly x 4 mm.    5.  There is now well-defined low-attenuation consistent within subacute infarct involving the inferior left frontal lobe extending up to the left basal ganglia primarily involving the left caudate head and anterior left putamen.    Cerebral Angiogram  Intraoperative left internal carotid artery angiography documenting successful surgical clipping of 4 intracranial aneurysms; anterior communicating aneurysm, left internal carotid artery terminus aneurysm, left anterior choroidal artery aneurysm, and   left middle cerebral artery bifurcation aneurysm. No parent artery compromise or other angiographic complication is suggested.  10/21/17  4.6 x 5 x 6.1 mm anteriorly projecting mildly irregular saccular aneurysm of the anterior communicating artery at the left anterior cerebral artery A1/A2 segment junction with a relatively narrow 2.8 mm neck.    3.6 x 3.4 x 3.3 mm inferiorly projecting bilobed saccular aneurysm of the left anterior choroidal artery origin with a 2.1 mm neck, incorporating the anterior choroidal artery at its medial margin.    2.6 x 1.1 x 1.6 mm superiorly projecting broad-based aneurysm of  the left carotid terminus.    2.1 x 1.7 x 2.4 mm laterally projecting saccular aneurysm of the left middle cerebral artery bifurcation with a broad 2 mm neck.    1.5 x 1.7 x 1.6 mm inferiorly projecting aneurysm of the right anterior choroidal artery origin with a broad 1.7 mm neck, incorporating the anterior choroidal artery at its anterior margin.    Results were discussed with the patient's family and neurosurgery team immediately following the procedure. Due to the multiplicity of left anterior circulation aneurysms and subarachnoid hemorrhage filling both the suprasellar cistern and the left   sylvian fissure asymmetrically, reliable determination of a single aneurysm for endovascular treatment as the hemorrhage source is limited. After discussion with the neurosurgical team, the decision was made to proceed with open surgical clipping in the   morning.    Evaluation and stenosis determination based on NASCET criteria.  Pertinent Labs   Lab Results: personally reviewed.   Recent Results (from the past 24 hour(s))   POCT Glucose    Collection Time: 10/23/17  8:14 AM   Result Value Ref Range    Glucose,  mg/dL   POCT Glucose    Collection Time: 10/23/17 11:32 AM   Result Value Ref Range    Glucose,  mg/dL   POCT Glucose    Collection Time: 10/23/17  5:09 PM   Result Value Ref Range    Glucose,  mg/dL   POCT Glucose    Collection Time: 10/23/17  8:23 PM   Result Value Ref Range    Glucose,  mg/dL   Magnesium    Collection Time: 10/24/17  4:45 AM   Result Value Ref Range    Magnesium 2.0 1.8 - 2.6 mg/dL   Potassium - Next AM    Collection Time: 10/24/17  4:45 AM   Result Value Ref Range    Potassium 3.7 3.5 - 5.0 mmol/L   Sodium    Collection Time: 10/24/17  4:45 AM   Result Value Ref Range    Sodium 142 136 - 145 mmol/L         HOSPITAL MEDICATIONS       famotidine (PEPCID) injection  20 mg Intravenous BID    Or     famotidine  20 mg Oral BID     influenza vacc high dose (age 65 or >)  (PF) 2017-18  0.5 mL Intramuscular Prior to Discharge     insulin aspart (NovoLOG) injection   Subcutaneous TID with meals     insulin aspart (NovoLOG) injection   Subcutaneous QHS     levETIRAcetam (KEPPRA) IVPB  500 mg Intravenous Q12H     lisinopril  20 mg Oral DAILY     niMODipine  60 mg Oral Q4H    Or     niMODipine  60 mg Enteral Tube Q4H     polyethylene glycol  17 g Oral DAILY     potassium chloride  10 mEq Intravenous Q1H     senna-docusate  1 tablet Oral BID        Total time spent for face to face visit, reviewing labs/imaging studies, counseling and coordination of care was: 30 Minutes More than 50% of this time was spent on counseling and coordination of care.    Cal Mcduffie MD  [facility]  Direct Secure Messaging: [email or web address]  Tel: [phone]    This note was dictated using voice recognition software.  Any grammatical or context distortions are unintentional and inherent to the software.

## 2021-06-13 NOTE — PROGRESS NOTES
Spiritual Care Note    Spiritual Assessment:   initiated visit with patient this morning to introduce self, spiritual care services and assess needs.  arrived to find patient sleeping;  did not attempt to wake this patient.     Care Provided: No direct spiritual care offered at this time as patient is unavailable.    Plan of Care: Spiritual care will continue to follow as part of patient's care team.    TED Patel, BCC

## 2021-06-13 NOTE — PROGRESS NOTES
Progress Note    Assessment/Plan  71 yo M with h/o hypertension who presented 10/21/17 after a witnessed syncopal event. He had sudden onset dizziness, light headedness, nausea and vomiting. He was taken to ER, CT scan showed acute subarachnoid hemorrhage centered within the basal cisterns; CTA revealed 4.6 x 3.4 mm anteriorly directed CONCEPCION aneurysm. Cerebral angiogram revealed multiple intracranial aneurysms. He underwent emergent left sided crani and clipping of left MCA, left anterior choroidal, left carotid terminus, left CONCEPCION; and placement of ventriculostomy by Dr. RENO Espinal. There remains one unsecured aneurysm on the right. Neurosurgery removed drain today. Due to concern about fluctuating mental status and possible seizures continuous EEG started by Neuro today and Keppra dose increased.      Principal Problem:    SAH (subarachnoid hemorrhage) POD#9, PBD #10 - secondary to ruptured cerebral aneurysm s/p crani with multiple clips    Obstructive Hydrocephalus - External ventricular drain removed today by Neurosurgery      Fluctuating mental status/Acute encephalopathy likely secondary to seizures: Concern for seizures. Keppra dose increased and continuous EEG started today. No signs or symptoms of infection. No fevers or leukocytosis.       Active Problems:    Fevers on admission (resolved now) - possibly central. Urine Cx negative. Procalcitonin negative and CXR without infiltrates to suggest pneumonia.  Completed 5 days of empiric Zosyn.    Benign essential HTN - SBP goal 110-160. Continue nimodipine,labetalol and lisinopril    Brain edema - keep sodium in normal to upper normal range       Barriers to Discharge : ventriculostomy removed today, confusion  Anticipated Discharge :TBD       Subjective  Patient resting comfortably. ROS limited due to confusion but overall negative.    Objective    Vital signs in last 24 hours  Temp:  [97.9  F (36.6  C)-99  F (37.2  C)] 98.2  F (36.8  C)  Heart Rate:  [63-77]  75  Resp:  [11-31] 19  BP: (107-156)/(54-75) 107/54  Weight:   186 lb 12.8 oz (84.7 kg)    Intake/Output last 3 shifts  I/O last 3 completed shifts:  In: 820 [P.O.:820]  Out: 935 [Urine:935]  Intake/Output this shift:       Physical Exam  General appearance: confused but cooperative  Lungs: clear to auscultation bilaterally  Heart: regular rate and rhythm, S1, S2 normal  Abdomen: soft, non-tender  Extremities:  atraumatic, no cyanosis or edema  Neurologic: moving all 4 extremities spontaneously, no focal weakness        Meds    nacl 0.9% Stopped (10/29/17 2300)       influenza vacc high dose (age 65 or >) (PF) 2017-18  0.5 mL Intramuscular Prior to Discharge     insulin aspart (NovoLOG) injection   Subcutaneous TID with meals     insulin aspart (NovoLOG) injection   Subcutaneous QHS     labetalol  100 mg Oral TID     levETIRAcetam  500 mg Oral BID     lidocaine (XYLOCAINE) 1 % local injection  10 mL Infiltration Once     lisinopril  40 mg Oral DAILY     niMODipine  60 mg Oral Q4H    Or     niMODipine  60 mg Enteral Tube Q4H     pantoprazole  40 mg Oral QAM AC     polyethylene glycol  17 g Oral DAILY     senna-docusate  1 tablet Oral BID       Pertinent Labs   Lab Results: personally reviewed.   not applicable      Results from last 7 days  Lab Units 11/01/17  0451 10/31/17  0502 10/30/17  0400   LN-SODIUM mmol/L 137 136 138   LN-POTASSIUM mmol/L 4.1 3.8 4.0   LN-CHLORIDE mmol/L 111* 110* 111*   LN-CO2 mmol/L 18* 19* 17*   LN-BLOOD UREA NITROGEN mg/dL 18 17 17   LN-CREATININE mg/dL 0.80 0.75 0.80   LN-CALCIUM mg/dL 9.1 8.7 8.0*           Results from last 7 days  Lab Units 11/01/17  0451 10/31/17  0502 10/30/17  0400   LN-WHITE BLOOD CELL COUNT thou/uL 7.4 6.9 6.1   LN-HEMOGLOBIN g/dL 11.6* 11.3* 11.3*   LN-HEMATOCRIT % 34.1* 32.6* 32.7*   LN-PLATELET COUNT thou/uL 266 310 317

## 2021-06-13 NOTE — PROGRESS NOTES
Returned to bed  Fell asleep anu.influenza chair over 4hrs.participated in therapies..issues with swallowing pills..given yougart to help pills go down  Or maybe crush to assure he swallows them..trying to remove soft wrist restraints, release per protocol.Continue with observation and comfort measures

## 2021-06-13 NOTE — ED PROVIDER NOTES
eMERGENCY dEPARTMENT eNCOUnter      CHIEF COMPLAINT    Chief Complaint   Patient presents with     Syncope       HPI    ICisco, am serving as a scribe to document services personally performed by Chasity Stanley MD, based on my observations and the provider's statements to me.    The history is limited secondarily to a language barrier. An  was used.     Callie Crocker is a 72 y.o. male with no medical history on file who presents to the ED via EMS for evaluation of syncope.    The patient reports that earlier today he experienced the sudden onset of lightheadedness, dizziness, nausea when he vomited and had a witnessed syncopal episode. This occurred outside of a store where he was buying ear buds and was witnessed by his father. The patient does not remember this episode. Per EMS, the patient vomited again en route. Also, he was incontinent of urine. He did not display seizure-like activity. Presently he describes a severe headache. The patient reports it has been a long time since he last saw a doctor. He denies chest pain, shortness of breath, and abdominal pain. He denies any medical issues. He denies recent travel outside of the [place].     Primary care provider: No Primary Care Provider    I, Chasity Stanley MD, attest that Cisco Choi is acting in a scribe capacity, has observed my performance of the services and has documented them in accordance with my direction.    PAST MEDICAL HISTORY    Relevant past surgical history reviewed with patient, unless otherwise stated in HPI, history not pertinent to this visit.    Relevant past medical history reviewed with patient, unless otherwise stated in HPI, history not pertinent to this visit.    CURRENT MEDICATIONS    Patient's Medications   New Prescriptions    No medications on file   Previous Medications    DOXYLAMINE (UNISON) 25 MG TABLET    Take 25 mg by mouth at bedtime as needed for sleep.   Modified Medications    No medications on  file   Discontinued Medications    No medications on file       ALLERGIES    No Known Allergies    FAMILY HISTORY    No family history on file.    SOCIAL HISTORY    Social History     Social History     Marital status:      Spouse name: N/A     Number of children: N/A     Years of education: N/A     Social History Main Topics     Smoking status: Not on file     Smokeless tobacco: Not on file     Alcohol use Not on file     Drug use: Not on file     Sexual activity: Not on file     Other Topics Concern     Not on file     Social History Narrative       REVIEW OF SYSTEMS    Constitutional: No f/c. Positive for lightheadedness and dizziness. Positive for syncope.  General: No recent travel, no recent change in medications  HEENT: no nasal drainage, no sore throat. Headache.  Cardiac: No chest pain  Respiratory: No sob  Gastrointestinal: No abdominal pain, no melena or hematemesis. Positive for nausea and vomiting.   Neuro: No confusion, slurred speech, or focal weakness  Skin: No rash  : No dysuria/hematuria. Urinary incontinence.  Psych: No SI/HI  Musculoskeletal: No pain    All other systems negative unless noted in HPI.    PHYSICAL EXAM    VITAL SIGNS: /81  Pulse 81  Temp 98.1  F (36.7  C)  Resp 14  Wt 209 lb 14.1 oz (95.2 kg)  SpO2 98%  Constitutional: Mild distress  HEENT: oropharynx clear, no lad  Cardiac: RRR, no m/r/g  Respiratory: CTA B  Abdominal: Soft, NT/ND, no guarding/rebound  Extremities: Peripheral pulses intact, no pedal edema  Neuro: 2-12 intact, interacting appropriately, normal sensation ue/le bilateral, normal strength ue/le bilateral, normal fnf  Skin: No rash  Musculoskeletal: No bony tenderness  Psych: Appropriate      LABS  Pertinent lab results reviewed in chart.  Results for orders placed or performed during the hospital encounter of 10/21/17   Basic Metabolic Panel   Result Value Ref Range    Sodium 138 136 - 145 mmol/L    Potassium 4.1 3.5 - 5.0 mmol/L    Chloride 106  98 - 107 mmol/L    CO2 25 22 - 31 mmol/L    Anion Gap, Calculation 7 5 - 18 mmol/L    Glucose 158 (H) 70 - 125 mg/dL    Calcium 9.1 8.5 - 10.5 mg/dL    BUN 12 8 - 28 mg/dL    Creatinine 0.79 0.70 - 1.30 mg/dL    GFR MDRD Af Amer >60 >60 mL/min/1.73m2    GFR MDRD Non Af Amer >60 >60 mL/min/1.73m2   Hepatic Profile   Result Value Ref Range    Bilirubin, Total 0.8 0.0 - 1.0 mg/dL    Bilirubin, Direct 0.2 <=0.5 mg/dL    Protein, Total 7.4 6.0 - 8.0 g/dL    Albumin 3.7 3.5 - 5.0 g/dL    Alkaline Phosphatase 110 45 - 120 U/L    AST 25 0 - 40 U/L    ALT 24 0 - 45 U/L   INR   Result Value Ref Range    INR 1.04 0.90 - 1.10   HM1 (CBC with Diff)   Result Value Ref Range    WBC 3.9 (L) 4.0 - 11.0 thou/uL    RBC 4.95 4.40 - 6.20 mill/uL    Hemoglobin 15.2 14.0 - 18.0 g/dL    Hematocrit 42.4 40.0 - 54.0 %    MCV 86 80 - 100 fL    MCH 30.7 27.0 - 34.0 pg    MCHC 35.8 32.0 - 36.0 g/dL    RDW 13.4 11.0 - 14.5 %    Platelets 195 140 - 440 thou/uL    MPV 9.8 8.5 - 12.5 fL    Neutrophils % 57 50 - 70 %    Lymphocytes % 32 20 - 40 %    Monocytes % 7 2 - 10 %    Eosinophils % 4 0 - 6 %    Basophils % 0 0 - 2 %    Neutrophils Absolute 2.2 2.0 - 7.7 thou/uL    Lymphocytes Absolute 1.2 0.8 - 4.4 thou/uL    Monocytes Absolute 0.3 0.0 - 0.9 thou/uL    Eosinophils Absolute 0.2 0.0 - 0.4 thou/uL    Basophils Absolute 0.0 0.0 - 0.2 thou/uL       EKG  Time: 12:52  Vent. Rate: 75 bpm  DC interval: 172 ms  QTc: 451 ms  Comments:  Normal sinus rhthm  Minima voltage criteria for LVH, may be normal variant.  Borderline ECG    RADIOLOGY  Images independently reviewed.  Ct Head Without Contrast    Result Date: 10/21/2017  [facility] CT HEAD WITHOUT CONTRAST 10/21/2017, 1:44 PM INDICATION: Headache, acute, severe, thunderclap, worst headache of life. TECHNIQUE: Without IV contrast. Dose reduction techniques were used. CONTRAST: None. COMPARISON:  None. FINDINGS: Acute subarachnoid hemorrhage centered within the suprasellar cistern with  anterior extension along the interhemispheric fissure, lateral extension into the sylvian fissures (left more than right), and inferior extension along the prepontine cistern. Small amount of intraventricular hemorrhage within the occipital horn of the right lateral ventricle. Mild prominence of the temporal horns. The ventricles are otherwise unremarkable. Mild generalized atrophy. Indeterminate calcification within the right posterior frontal lobe. The calvarium and skull base are intact. The orbits, paranasal sinuses, and mastoids are unremarkable.     CONCLUSION: 1.  Acute subarachnoid hemorrhage centered within the basal cisterns. Recommend CT angiogram to evaluate for aneurysm. 2.  Small amount of intraventricular hemorrhage and mild prominence of the temporal horns suggesting early hydrocephalus. The findings were discussed with Dr. Stanley at 1355 hours on 10/21/2017.       ED MEDS  New Prescriptions    No medications on file         ED COURSE   12:54 PM The patient was interviewed and examined.  History in the chart was reviewed.  1:45 PM I evaluated the CT.  1:49 PM Paged neuro surgery, hospitalist, and intensivist.   1:50 PM I updated the patient and ordered a Nicardipine drip.  1:54 PM I spoke with the the intensivist.   1:59 PM I spoke with radiology.  1:59 PM I spoke with Neurosurgery, Lita Tiwari, CNP.  2:01 PM I spoke with the Hospitalist Dr. Shah.  2:01 PM I paged interventional radiology.   2:09 PM I spoke with interventional radiology.   2:16 PM Spoke with family and patient    MEDICAL DECISION MAKING  72-year-old presents with syncope.  Patient states he has not had any chest pain shortness of breath or abdominal pain recently but today while he was at a store he began feeling very lightheaded nauseated had an episode of vomiting and passed out.  When he passed out he did have incontinence.  Denies any preceding chest pain shortness of breath or abdominal pain or symptoms like that  in the last few weeks.  He has been having generalized weakness recently.  He states that he had a significant headache prior to passing out.  Possibly seizure subarachnoid hemorrhage masses etiology for symptoms.  Cardiac etiology and differential as well will order CBC chemistry LFTs CT had we will treat with Zofran and reevaluate    Plan: Subarachnoid hemorrhage is likely etiology for symptoms CTA ordered and interventional radiology aware.  Will admit to the intensive care unit patient and family comfortable with admission plan        FINAL DIAGNOSIS:   1. SAH (subarachnoid hemorrhage)        At the conclusion of the encounter I discussed  the results of all of the tests and the disposition with patient.   All questions were answered.  The patient acknowledged understanding and was involved in the decision making regarding the overall care plan.         Chasity Stanley MD  10/21/17 1524

## 2021-06-13 NOTE — PROGRESS NOTES
NEUROSURGERY POST-OP NOTE:    ASSESSMENT:      Callie Crocker is POD # 7,  Bleed Day #8 status post  LEFT SIDED CRANIOTOMY AND CLIPPING OF MULTIPLE INTRACRANIAL ANEURYSMS (left MCA, Left anterior choroidal, left carotid terminus, left CONCEPCION),  AND PLACEMENT OF VENTRICULOSTOMY  by Dr. Lisette Espinal. Complete occlusion of left sided clipped aneurysms. Left MCA aneurysm with clot present, likely to be cause of SAH. Patient still has one unsecured aneurysm on the right. Confusion persists, likely secondary to significant burden of SAH. Now afebrile, WBC WNL. Given patient afebrile, off antibiotics, with normal WBC would not send CSF at this time, discussed with Dr. Lisette Espinal who is in agreement. Plan to clamp ventric today and if tolerates will assess for removal tomorrow.       PLAN:           Patient Active Problem List   Diagnosis     SAH (subarachnoid hemorrhage)     Benign essential HTN  SBP Goal <160 >110        Hydrocephalus  -Q 2 neuro checks  -Elevate HOB at least or higher 30 degrees  -1:1 --no sedation unless safety issue , discuss w neurosurgery before giving benzos    TCDs complete today       Aneurysm     Brain edema     Aneurysmal subarachnoid hemorrhage  Goal euvolemia  Clamp ventric (clamped at noon today). Monitor ICP hourly. Call for elevation in ICP or change in exam  Continue kelley to monitor I&O, now with fluid deficit. Will continue today and consider removal tomorrow  Goal normal sodium   Lower extremity ultrasound to eval for DVT                Discharge Recommendations/Plan:  Barriers to Discharge: yes, aneurysmal SAH with ventriculostomy placement and need for ongoing tracking for vasospasms      Follow Up Plan: Right unsecured aneurysm will need to be addressed after this event, possible follow up with IR vs surveillance monitoring. Plan TBD per Dr. Lisette Espinal               HPI: Callie Crocker is status post LEFT SIDED CRANIOTOMY AND CLIPPING OF MULTIPLE INTRACRANIAL  ANEURYSMS, INTRA-OP ANGIOS AND PLACEMENT OF VENTRICULOSTOMY  by Dr. Lisette Espinal. He is 72 year old male presented after sudden onset of lightheadedness, dizziness, nausea and vomiting and syncopal event witnessed by his son. CT in the ED revealed subarachnoid hemorrhage, CTA 4.6 x 3.4 mm anteriorly directed CONCEPCION aneurysm. 4V revealed multiple aneurysms as detailed below. He was taken to the OR for clipping with Dr. Espinal. All four of the left sided aneurysms were clipped and completely occluded, the right sided aneurysm remains unsecured.        SUBJECTIVE:  Up to chair x 4 hours today, still with intermittent periods of agitation. Confusion persists.       OBJECTIVE:  Vital signs in last 24 hours  Temp:  [97.2  F (36.2  C)-98.8  F (37.1  C)] 98.8  F (37.1  C)  Heart Rate:  [65-96] 83  Resp:  [13-33] 21  BP: (101-172)/(55-96) 134/58  Body mass index is 28.53 kg/(m^2).    Mental Status: alert, oriented to self. Thinks he is at home, states correct month, unable to report the year. speech slow  Cranial Nerves: Right sided facial weakness  Motor Strength:normal 5/5 strength in all tested muscle groups  Incision: Incision was approximated with staples and covered with dry gauze.    ICP: 4-18, 8 at time of exam    Ventric: patent, pulsatile, pink tinged. Minimal output over the last 24 hours    Last 3 TCD Values       10/28/2017 10/29/2017 10/30/2017   LMCA    54 83 70   LACA    68 59 71   RMCA    74 72 74   RACA    67 61 68   BA    36 53 59          Pertinent Labs     Results for MAK ORTIZ (MRN [id number]) as of 10/30/2017 16:00     10/30/2017 04:00   WBC 6.1   RBC 3.66 (L)   Hemoglobin 11.3 (L)   Hematocrit 32.7 (L)   MCV 89   MCH 30.9   MCHC 34.6   RDW 14.6 (H)   Platelets 317   MPV 10.7     Results for MAK ORTIZ (MRN [id number]) as of 10/30/2017 16:00   10/30/2017 04:00   Sodium 138   Potassium 4.0       Radiology:    None new      Lizabeth A. Aldo -[facility]  [address]  [facility], [address] [address]    O: [phone]  P: [phone]

## 2021-06-13 NOTE — PROGRESS NOTES
Neurosurgery HD #6, SAH post bleed day 7, left MCA left   anterior choroidal , left carotid terminus and left CONCEPCION, placement of venteric)    Unsecured aneurysm on right (goal BP < 160)    Exam thru     AVSS  ICP 11s open at 15 above, tea colored.  TCDs pending    Pt with confused mumblings  Opens eyes to pain (falls back asleep)  Face sym,   MAEW briskly to command (with eyes closed)     Na 139    Continue present care,   Continue to wean  ventric move to 20 (leave open)  Awaiting TCDs    Renetta Loaiza

## 2021-06-13 NOTE — PROGRESS NOTES
NEUROSURGERY POST-OP NOTE:    ASSESSMENT:     Callie Crocker is post bleed day 17, post operative day 16 status post  LEFT SIDED CRANIOTOMY AND CLIPPING OF MULTIPLE INTRACRANIAL ANEURYSMS, INTRA-OP ANGIOS AND PLACEMENT OF VENTRICULOSTOMY  by Dr. Lisette Espinal. Awaiting authorization for Bruno, patient can d/c anytime    I will remove his staples this morning. Patient is in restraints due to scratching at his incision, hopefully we can discontinue these.     From neurosurgical perspective he could discharge to [place] at any point. Spoken with care management on several occassions, financial social work, Bruno referral specialist.     PLAN:   Patient Active Problem List   Diagnosis     Benign essential HTN  Target -160, BP has been within goal       Obstructive hydrocephalus     Aneurysm     Brain edema     Aneurysmal subarachnoid hemorrhage     Nontraumatic subarachnoid hemorrhage  Target normal blood glucose to promote wound healing  Monitor incisional site, will remove staples today. Suture to be removed 11/15.  Crani cap over incision at all times  Keep hands away from wound  Continue PT and OT  Discharge to [place] once approved  Tylenol as needed for headache       Encephalopathy acute     Seizure-like activity  -Patients alertness waxes and wanes  Continue Keppra as recommended by neurology          Discharge Recommendations/Plan:  Barriers to Discharge: He can discharge from Neurosurgery view. Awaiting Bruno approval     Follow Up Plan: 2 weeks with repeat head CT (11/15/2017), additional follow up per Dr. Lisette Espinal, will need surveillance imaging of unsecure aneurysm on the right versus IR coiling         HPI: HPI: Callie Crocker is status post LEFT SIDED CRANIOTOMY AND CLIPPING OF MULTIPLE INTRACRANIAL ANEURYSMS, INTRA-OP ANGIOS AND PLACEMENT OF VENTRICULOSTOMY  by Dr. Lisette Espinal. He is 72 year old male presented after sudden onset of lightheadedness, dizziness,  nausea and vomiting and syncopal event witnessed by his son. CT in the ED revealed subarachnoid hemorrhage, CTA 4.6 x 3.4 mm anteriorly directed CONCEPCION aneurysm. 4V revealed multiple aneurysms as detailed below. He was taken to the OR for clipping with Dr. Espinal. All four of the left sided aneurysms were clipped and completely occluded, the right sided aneurysm remains unsecured. Ventriculostomy was removed 11/1/2017 after weaning and tolerating clamping of the ventric for two days. Postop course has been complicated by fluctuating mental status, social issues, and high risk for vasospasm.    SUBJECTIVE:  Sleepy, patient complaining of headache. Moving all extremities.       OBJECTIVE:  Vital signs in last 24 hours  Temp:  [96.5  F (35.8  C)-98.2  F (36.8  C)] 97.9  F (36.6  C)  Heart Rate:  [60-70] 60  Resp:  [17-20] 18  BP: ()/(55-71) 137/68  Body mass index is 27.38 kg/(m^2).    Mental Status: Napping, wakes easily to verbal stimulation, oriented only to self, tells me he is at my clinic. Thinks its 2009.   Cranial Nerves: PERRL, EOMI, face symmetric  Motor Strength:  Spontaneously moving all extremities with equal strength and coordination.    Incision: well approximated, clean/dry and intact. Old drainage present. Fluid collection anterior to incision, not pulling on incision site. Staples removed. Suture intact at ventric site.     Urinary catheter out since 10/31/2017 3 consecutive bladder scans were all < 300 ml    Pertinent Labs   Lab Results:   CBC:   Lab Results   Component Value Date    WBC 4.3 11/07/2017    RBC 4.04 (L) 11/07/2017     BMP:   Lab Results   Component Value Date    CO2 17 (L) 11/07/2017    BUN 23 11/07/2017    CREATININE 0.72 11/07/2017    CALCIUM 9.4 11/07/2017       Pertinent Radiology   Radiology Results: None arabella Zamora [facility]  O: [phone]  P: [phone]

## 2021-06-13 NOTE — PROGRESS NOTES
Speech Language/Pathology  Speech Therapy Daily Progress Note    Nursing called to report patient had difficulty with swallowing with breakfast. Attempted to see patient x2 to reassess, but unable due to medical status and emesis. Nursing to proceed with oral intake with caution and ST to reattempt diet f/u and swallow assessment again tomorrow.     Plan/Recommendations  Repeat swallow test due to reports of difficulty with intake.     current goals remain appropriate    10 conference minutes     Lesley Phan MA, CCC-SLP

## 2021-06-13 NOTE — PROGRESS NOTES
NEUROSURGERY POST-OP NOTE:    ASSESSMENT:     Callie Crocker is POD # 2, Bleed Day #3 status post  LEFT SIDED CRANIOTOMY AND CLIPPING OF MULTIPLE INTRACRANIAL ANEURYSMS (left MCA, Left anterior choroidal, left carotid terminus, left CONCEPCION), INTRA-OP ANGIOS AND PLACEMENT OF VENTRICULOSTOMY  by Dr. Lisette Espinal. Complete occlusion of left sided clipped aneurysms. Left MCA aneurysm with clot present, likely to be cause of SAH. Patient still has one unsecured aneurysm on the right.    Interval HX: Overnight patient became agitated, received haldol and seroquel ordered by hospitalist. Concern for alcohol withdrawal although family reports patient has not been a heavy drinker for 6 years. This morning, patient had coffee ground emesis (without retching, bedside RN was present for emesis). Will reorder head CT to verify no further bleeding/no further burden from pneumocephalus.  Repeat head CT performed last evening with evidence of subacute infarct however this wouldn't be unexpected due to surgical resection in the same area. See Dr. Lisette Espinal's note from this AM.    Patient is drowsier this morning, however exam remains consistent, patient continues to follow commands but is disoriented. Denies headache. No s/sx consistent with vasospasm, TCDs without evidence of vasospasm however again unable to assess LACA due to staple. Family update given this afternoon at bedside.    PLAN:   Patient Active Problem List   Diagnosis     SAH (subarachnoid hemorrhage)     Benign essential HTN  -Hospitalist management, SBP Goal <160 but ideally >110. He was elevated overnight without change in cardene drip, discussed with RN/Intensivist team   -PRNs changed to reflect BP goal, has labetalol, hydralazine, nicardipine as needed     Hydrocephalus (improving)  -Maintain Ventric at 10, document ICPs/Output hourly  -Hourly neuro checks  -Elevated HOB >30 degrees  -100% O2 via mask for 24 hours for pneumocephalus on  CT  -ICU  status due to ventric, hourly neuro checks  -Call neurosurgery prior to giving further sedating medications  -Would not consider weaning ventric until bleed day #7 and patient is without evidence of vasospasm     Aneurysm     Brain edema  Coffee Ground emesis  -Started on protonix, intensivist managing      Aneurysmal subarachnoid hemorrhage  -Repeat Head CT  -Continue TCDs, monitor clinically for s/sx of vasospasm such as headache/stroke symptoms  -Goal normal sodium, not currently on sodium supplementation. Sodium today 142. Daily Sodiums  -Would like to see patient euvolemia or positive volume status. Strict I/Os.   -Volume positive today, chest x-ray with venous congestion. Okay with gentle diuresis. Intensivist managing  -Keppra 500mg BID, will keep IV for now given drowsiness              Bowel and Bladder Plan:     Pringle: yes  If yes, document reason / plan : strict intakes and outputs     Bowel Management:  Prophylaxis yes   Last BM: prior to admission, on senna   Medications Plan:     Antiepileptics: Yes keppra 500mg BID for at least one month if yes, plan      Anticoagulation/Antiplatelets: Hold all anticoagulants in the setting of hemorrhage     DVT Prophylaxis: intermittent pneumatic compression boots     GI Prophylaxis: yes          Discharge Recommendations/Plan:  Barriers to Discharge: yes, aneurysmal SAH with ventriculostomy placement.     Follow Up Plan: Right unsecured aneurysm will need to be addressed after this event, possible follow up with IR vs surveillance monitoring. Plan TBD per Dr. Lisette Espinal           HPI: Callie Crocker is POD # 2, bleed day #3 status post LEFT SIDED CRANIOTOMY AND CLIPPING OF MULTIPLE INTRACRANIAL ANEURYSMS, INTRA-OP ANGIOS AND PLACEMENT OF VENTRICULOSTOMY  by Dr. Lisette Espinal. He is 72 year old male presented after sudden onset of lightheadedness, dizziness, nausea and vomiting and syncopal event witnessed by his son. CT in the ED revealed subarachnoid  hemorrhage, CTA 4.6 x 3.4 mm anteriorly directed CONCEPCION aneurysm. 4V revealed multiple aneurysms as detailed below. He was taken to the OR for clipping with Dr. Espinal. All four of the left sided aneurysms were clipped and completely occluded, the right sided aneurysm remains unsecured.    10/23- Became agitated overnight (into 10/24), concern for alcohol withdrawal, necessitated PRNs for agitation. Repeat head CT with pneumocephalus left frontal, interval decrease in size of SAH, ventricular system mildly decreased hydrocephalus, midline shift. Subacute infarct noted, not unexpected due to approach in surgery.  10/24- Coffee ground emesis, started on protonix. Repeat head CT reveals interval decrease in SAH, increased IVH, infarcts consistent with surgical intervention      SUBJECTIVE:  Denies headache, follows commands at second approach (drowsy at first). Reports he is in his house. Bilateral wrist restraints    OBJECTIVE:  Vital signs in last 24 hours  Temp:  [99.2  F (37.3  C)-100.5  F (38.1  C)] 99.2  F (37.3  C)  Heart Rate:  [74-96] 78  Resp:  [12-46] 21  BP: (124-162)/(52-63) 162/52  Arterial Line BP: ()/() 146/49  Body mass index is 31.74 kg/(m^2).    Mental Status: alert, oriented only to self. Not to place, time or situation. Speech is less clear today due to secretions, patient is Welsh speaking.  Cranial Nerves: PERRL, tongue protrudes centrally and moves freely from side to side. Right sided pronator drift.   Motor Strength: Moves all extremities to command, right side appears slightly weaker than left in drift,  in RUE and movement of right lower extremity.     Incision: Incision was covered with dry gauze, incision is clean/dry and intact and without drainage.  Drain: left frontal Ventriculostomy in place with blood tinged drainage 105 ml of output in last 24 hours. Blood tinged, some sunshine blood in tubing. Pulsatile. ICP 3 for me.  Volume Status: +3212 in last 24 hours  Pertinent  Labs   Results for MAK ORTIZ (MRN [id number]) as of 10/24/2017 10:12   10/24/2017 04:45   Sodium 142   Potassium 3.7   Chloride 117 (H)   CO2 17 (L)   Anion Gap, Calculation 7   BUN 28   Creatinine 0.93   GFR MDRD Af Amer >60   GFR MDRD Non Af Amer >60   Calcium 8.2 (L)   Magnesium 2.0   Glucose 192 (H)       Pertinent Radiology     Head CT 10/24:  Interval left-sided craniotomy with aneurysm clip placement at the left middle cerebral artery bifurcation, left anterior choroidal region, and left anterior communicating artery. There is an extra-axial fluid attenuation collection subjacent   to the craniotomy defect measuring 11 mm. This contains a small amount of air. Bifrontal pneumocephalus.     Acute subarachnoid hemorrhage within the basal cisterns has decreased. Intraventricular hemorrhage within the occipital horns is increased. Left frontal approach ventriculostomy drain traverses the body of the left lateral ventricle and terminates in the   body of the right lateral ventricle. The ventricular system is relatively decompressed. Low-attenuation involving the left caudate head, left anterior putamen, and left inferobasal frontal lobe consistent with infarction. Mass effect with rightward   shift of midline structures by 4 mm    Radiology Results:   4V revealed  4.6 x 5 x 6.1 mm anteriorly projecting mildly irregular saccular aneurysm of the anterior communicating artery at the left anterior cerebral artery A1/A2 segment junction with a relatively narrow 2.8 mm neck.3.6 x 3.4 x 3.3 mm inferiorly projecting bilobed saccular aneurysm of the left anterior choroidal artery origin with a 2.1 mm neck, incorporating the anterior choroidal artery at its medial margin.     2.6 x 1.1 x 1.6 mm superiorly projecting broad-based aneurysm of the left carotid terminus.     2.1 x 1.7 x 2.4 mm laterally projecting saccular aneurysm of the left middle cerebral artery bifurcation with a broad 2 mm neck.     1.5 x 1.7 x  1.6 mm inferiorly projecting aneurysm of the right anterior choroidal artery origin with a broad 1.7 mm neck, incorporating the anterior choroidal artery at its anterior margin.    ADDITIONAL COMMENTS:The patient's clinical examination, laboratory data, and plan was discussed with Dr. Lisette Espinal.    Flori ZamoraEC[facility]  O: [phone]  P: [phone]

## 2021-06-13 NOTE — PROGRESS NOTES
"Critical Care Progress Note     Admit Date: 10/21/2017  ICU Day: 11     Code Status: full code    CC: SAH    HPI: 72 year old gentleman with PMH of hypertension. He presented 10/21/17 after a witnessed syncopal event. He had sudden onset dizziness, light headedness, nausea and vomiting. He was taken to ER, CT scan showed acute subarachnoid hemorrhage centered within the basal cisterns; CTA revealed 4.6 x 3.4 mm anteriorly directed CONCEPCION aneurysm. Cerebral angiogram revealed multiple intracranial aneurysms. He underwent emergent left sided crani and clipping of left MCA, left anterior choroidal, left carotid terminus, left CONCEPCION; and placement of ventriculostomy by Dr. R[place] Espinal. There remains one unsecured aneurysm on the right. Post op he was taken to ICU for further monitoring and care.     Major events over the last 24 hours: ventric remains clamped     Subjective: up in chair,  on [place],  He is in nad   ROS: denies ha or cp    Drips: None    Ventilator: Mechanical Ventilation Day: NA        Settings: NA    /54  Pulse 75  Temp 98.2  F (36.8  C) (Oral)   Resp 19  Ht 5' 8\" (1.727 m)  Wt 186 lb 12.8 oz (84.7 kg)  SpO2 98%  BMI 28.4 kg/m2     I/O last 3 completed shifts:  In: 820 [P.O.:820]  Out: 935 [Urine:935]  Weight change: -5 lb 3.2 oz (-2.359 kg)         EXAM:   Mental status: awake, up in chair ventric  clamped  HEENT: skull cap on, ventric in place, clamped PERRL  Resp: cta non labored  Cardiovascular: RRR  Abdominal: soft nt + bs  Extremeties: no e/c/c  Neurology: WHITE  Other: ventric, kelley    Labs Personally reviewed: yes    Results from last 7 days  Lab Units 11/01/17  0451 10/31/17  0502 10/30/17  0400   LN-WHITE BLOOD CELL COUNT thou/uL 7.4 6.9 6.1   LN-HEMOGLOBIN g/dL 11.6* 11.3* 11.3*   LN-HEMATOCRIT % 34.1* 32.6* 32.7*   LN-PLATELET COUNT thou/uL 266 310 317       Results from last 7 days  Lab Units 11/01/17  0451 10/31/17  0502 10/30/17  0400   LN-SODIUM mmol/L 137 136 138 "   LN-POTASSIUM mmol/L 4.1 3.8 4.0   LN-CHLORIDE mmol/L 111* 110* 111*   LN-CO2 mmol/L 18* 19* 17*   LN-BLOOD UREA NITROGEN mg/dL 18 17 17   LN-CREATININE mg/dL 0.80 0.75 0.80   LN-CALCIUM mg/dL 9.1 8.7 8.0*       last fever recorded on 10/28/17 100.6    Microbiology: UC NGTD  BC NGTD  BC  Imaging (all imaging is personally reviewed): yes    PCXR 10/24/17-Mild pulmonary venous congestion. Trace bilateral pleural effusions. No pneumothorax. The osseous structures are intact. The cardiac mediastinal silhouette is within normal limits.    Impression:  1. Aneurysmal subarachnoid hemorrhage   secondary to ruptured cerebral aneurysm s/p crani with multiple clips  2. Hydrocephalus    S/p EVD  3. Fevers   Now afebrile   Cultures neg to date   procal 0.15 on 10/28   cxr ok  4. Seizure like activity   5. Hypernatremia    Improving    6. Acute encephalopathy   7. Benign essential HTN    PLAN:   1. Goal SBP < 160 per neurosurgery-nimodipine,labetalol and lisinopril  2. Antibiotics stopped 10/30  3. Per Neurosurgery they will check CT head today  To decide if ventric comes out  4. AED x 30 days per neurology  5. PTOT   ICU DAILY CHECKLIST                           Can patient transfer out of ICU? Await direction for neurosurgery re taking ventric out    FAST HUG:    Feeding:  Feeding: po    Pringle:Yes  Analgesia/Sedation:Not Indicated NA  Thromboembolic prophylaxis: yes; Mode:  SCDs  HOB>30:  Yes  Stress Ulcer Protocol Active: yes; Mode: PPI  Glycemic Control: Any glucose > 180 no; Mode of Insulin Therapy: Sliding Scale Insulin    INTUBATED:  Can patient have daily waking:  not applicable  Can patient have spontaneous breathing trial:  not applicable    Restraints? no    PHYSICAL THERAPY AND MOBILITY:  Can patient have PT and mobility trial: yes  Activity: chair, PTOT    transfer/discharge plans: stays in ICU for ventric    Leilani Wood [phone]  [facility] Pulmonary & Critical Care

## 2021-06-13 NOTE — PROGRESS NOTES
Pulmonary/Critical Care  10/23/2017  8:35 AM     Patient seen on morning interdisciplinary rounds.   Need for ongoing bilateral soft wrist restraints assessed and confirmed.     Angella Robb, [facility] Pulmonary/Critical Care

## 2021-06-13 NOTE — PROGRESS NOTES
Critical Care Progress Note  10/26/2017   10:43 AM     Admit Date: 10/21/2017  ICU Day: 5  Code Status: full code      Problem List:   Principal Problem:    SAH (subarachnoid hemorrhage)  Active Problems:    Benign essential HTN    Hydrocephalus    Brain edema    Aneurysmal subarachnoid hemorrhage    Nontraumatic subarachnoid hemorrhage    Encephalopathy acute    Seizure-like activity          Plan:   1. Tight blood pressure control; parameters set by Neurosurgery with Systolic goal 110 - 160. Currently off cardene   2. Ventric management per Neurosurgery. No weaning until bleed day #7 (today is day 5).   3. Change IVF to D5 1/2NS. No diuresis today. Watch sodium.    4. Speech to eval and initiate diet.    5. Repeat imaging for any change in neuro exam.  6.  Nimodipine as ordered by Neurosurgery.  7. Daily TCDs  8. Vanc/Zosyn added yesterday for fever spike. Will de-escalate quickly if no recurrent fever and no rise in WBC. Cultures have been sent.     ICU Prophylaxis   Feeding: as above  Pringle: yes - strict I/O   Analgesia/Sedation: as above- avoid sedating   DVT prophylaxis: SCDs  HOB > 30 degrees: Yes - must be maintained at 30degrees.    GI Proph: Protonix   Glycemic Control: SSI   Family updated: yes - at bedside    Dispo: ICU due to ongoing monitoring of ICP in patient with multiple intracranial aneurysms. High risk for acute deterioration and death.     Angella Robb, [facility] Pulmonary/Critical Care    Total critical care time: 35 minutes  _______________________________________________________________    HPI: 72 year old gentleman with PMH of hypertension. He presented 10/21 after a witnessed syncopal event. He had sudden onset dizziness, light headedness, nausea and vomiting. He was taken to ER, CT scan showed acute subarachnoid hemorrhage centered within the basal cisterns; CTA revealed 4.6 x 3.4 mm anteriorly directed CONCEPCION aneurysm. Cerebral angiogram revealed multiple intracranial aneurysms.  He underwent emergent left sided crani and clipping of left MCA, left anterior choroidal, left carotid terminus, left CONCEPCION; and placement of ventriculostomy by Dr. RENO Espinal. There remains one unsecured aneurysm on the right.   Post op he was taken to ICU for further monitoring and care.     Seen with aide of professional .     ROS: denies headache, nausea, shortness of breath.     Events over last 24-hours: no acute events overnight.     Objective:     Vitals:    10/26/17 0700 10/26/17 0800 10/26/17 0900 10/26/17 1000   BP: 157/70 167/72 165/72 154/66   Patient Position:       Pulse: 96 97 98 97   Resp: 18 16 18 (!) 7   Temp: (!) 101.4  F (38.6  C)  98.3  F (36.8  C)    TempSrc:   Oral    SpO2: 95% 95% 96% 95%   Weight:       Height:           I/O:     Intake/Output Summary (Last 24 hours) at 10/26/17 1042  Last data filed at 10/26/17 1000   Gross per 24 hour   Intake           2427.5 ml   Output             5040 ml   Net          -2612.5 ml     Wt Readings from Last 3 Encounters:   10/26/17 208 lb 1.8 oz (94.4 kg)      Weight change:     Physical Exam:  Gen:  Awake and interactive; no agitation today.   HEENMT:PERRLA, extra ocular movement intact, sclera clear, anicteric and Crani cap in place; Ventric with clearer output. Right facial droop.   NEURO: moves all extremities, right is weaker.   CARDIOVASCULAR: S1, S2 normal, no murmur, rub or gallop, regular rate and rhythm  PULMONARY:Unlabored on RA; lungs are clearer today. No wheeze.    GASTROINTESTINAL: abdomen is soft - active bowel sounds.   INTEGUMENT: generalized edema  PSYCH: more alert today; fixated on [place], [place], Judaism.     Labs:   Results from last 7 days  Lab Units 10/26/17  0427  10/21/17  1321   LN-SODIUM mmol/L 150*  < > 138   LN-POTASSIUM mmol/L 4.1  < > 4.1   LN-CHLORIDE mmol/L 127*  < > 106   LN-CO2 mmol/L 19*  < > 25   LN-BLOOD UREA NITROGEN mg/dL 21  < > 12   LN-CREATININE mg/dL 0.84  < > 0.79   LN-CALCIUM mg/dL 8.2*  < >  9.1   LN-PROTEIN TOTAL g/dL  --   --  7.4   LN-BILIRUBIN TOTAL mg/dL  --   --  0.8   LN-ALKALINE PHOSPHATASE U/L  --   --  110   LN-ALT (SGPT) U/L  --   --  24   LN-AST (SGOT) U/L  --   --  25   < > = values in this interval not displayed.      Results from last 7 days  Lab Units 10/26/17  0427   LN-WHITE BLOOD CELL COUNT thou/uL 7.6   LN-HEMOGLOBIN g/dL 9.8*   LN-HEMATOCRIT % 29.3*   LN-PLATELET COUNT thou/uL 178       Micro: none this admission     Imaging: all imaging personalized reviewed   10/24 Head CT - 1.  Interval left-sided craniotomy with placement of three aneurysm clips.  2.  Fluid collection subjacent to the craniotomy and bifrontal pneumocephalus.  3.  Interval decreased subarachnoid hemorrhage and increased intraventricular hemorrhage.  4.  Left frontal approach ventriculostomy with decompressed ventricular system.  5.  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  6.  Mild mass effect with mild rightward shift of midline structures by 4 mm.    10/24 CXR - Mild pulmonary venous congestion. Trace bilateral pleural effusions. No pneumothorax. The osseous structures are intact. The cardiac mediastinal silhouette is within normal limits.      Angella Robb, [facility] Pulmonary/Critical Care

## 2021-06-13 NOTE — ANESTHESIA PREPROCEDURE EVALUATION
Anesthesia Evaluation      Patient summary reviewed   No history of anesthetic complications     Airway   Mallampati: III  Neck ROM: full   Pulmonary - normal exam                          Cardiovascular   (+) hypertension (? compliance) poorly controlled, ,     Rhythm: regular  Rate: normal,         Neuro/Psych      Comments: Insomnia      Endo/Other - negative ROS      GI/Hepatic/Renal - negative ROS           Dental    (+) poor dentition, upper dentures and lower dentures                       Anesthesia Plan  Planned anesthetic: general endotracheal  Solid food at lunch prior to syncope  ASA 4 - emergent     Anesthetic plan and risks discussed with: patient and child/children  Anesthesia plan special considerations: rapid sequence induction, arterial catheterization, IV therapy two IVs,   Post-op plan: routine recovery

## 2021-06-13 NOTE — PROGRESS NOTES
Hospitalist Progress Note    Assessment/Plan  71 yo M with h/o hypertension who presented 10/21/17 after a witnessed syncopal event. He had sudden onset dizziness, light headedness, nausea and vomiting. He was taken to ER, CT scan showed acute subarachnoid hemorrhage centered within the basal cisterns; CTA revealed 4.6 x 3.4 mm anteriorly directed CONCEPCION aneurysm. Cerebral angiogram revealed multiple intracranial aneurysms. He underwent emergent left sided crani and clipping of left MCA, left anterior choroidal, left carotid terminus, left CONCEPCION; and placement of ventriculostomy by Dr. RENO Espinal. There remains one unsecured aneurysm on the right.  He has been in ICU getting close monitoring since admission without any signs of vasospasm on TCD's.      Principal Problem:    SAH (subarachnoid hemorrhage) - secondary to ruptured cerebral aneurysm s/p crani with multiple clips    Hydrocephalus - with external ventricular drain in place    Active Problems:    Fevers - ? [place].  No obvious source.  On Vanco and Zosyn empirically, follow closely    Benign essential HTN - keep -160 with nimodipine and lisinopril    Brain edema - keep sodium in normal to upper normal range    Encephalopathy acute - improved, follow    Seizure-like activity - on keppra    Hypernatremia - switched to 1/2 NS per neurosurgery - need to follow this closely so he doesn't drop lower than normal.      Barriers to Discharge:  EVD, BP control, fevers    Anticipated discharge date/Disposition: TBD    Subjective  Denies any c/o except mild HA intermittently which is unchanged over last few days.  No appetite but not much nausea.      Objective    Vital signs in last 24 hours  Temp:  [98.3  F (36.8  C)-101.4  F (38.6  C)] 98.6  F (37  C)  Heart Rate:  [] 91  Resp:  [7-31] 17  BP: (115-167)/(57-74) 165/74 98% O2 Device: None (Room air) O2 Flow Rate (L/min): 15 L/min  Weight:   208 lb 1.8 oz (94.4 kg) Weight change:     Intake/Output last 3  shifts  I/O last 3 completed shifts:  In: 3329 [P.O.:720; I.V.:1219; IV Piggyback:1390]  Out: 4252 [Urine:4175; Drains:77]  Body mass index is 31.64 kg/(m^2).    Physical Exam    General Appearance:    Alert, cooperative, no distress, appears stated age   Lungs:     rhonchi   Cardiovascular:    RRR S1S2   Abdomen:     Soft, non-tender, bowel sounds active all four quadrants,     no masses, no organomegaly   Neurologic:   Alert, motor 5/5 upper and lower ext symm, CN 2-12 intact     Pertinent Labs   Lab Results: personally reviewed.     Results from last 7 days  Lab Units 10/26/17  1520 10/26/17  1320 10/26/17  0427 10/25/17  1159 10/25/17  0357 10/24/17  0445  10/21/17  1321   LN-SODIUM mmol/L 141 150* 150* 148* 147* 141  142  < > 138   LN-POTASSIUM mmol/L  --   --  4.1 4.0 4.0 3.7  3.7  < > 4.1   LN-CHLORIDE mmol/L  --   --  127* 125*  --  117*  < > 106   LN-CO2 mmol/L  --   --  19* 18*  --  17*  < > 25   LN-BLOOD UREA NITROGEN mg/dL  --   --  21 27  --  28  < > 12   LN-CREATININE mg/dL  --   --  0.84 0.95  --  0.93  < > 0.79   LN-CALCIUM mg/dL  --   --  8.2* 8.2*  --  8.2*  < > 9.1   LN-ALBUMIN g/dL  --   --   --   --   --   --   --  3.7   LN-PROTEIN TOTAL g/dL  --   --   --   --   --   --   --  7.4   LN-BILIRUBIN TOTAL mg/dL  --   --   --   --   --   --   --  0.8   LN-ALKALINE PHOSPHATASE U/L  --   --   --   --   --   --   --  110   LN-ALT (SGPT) U/L  --   --   --   --   --   --   --  24   LN-AST (SGOT) U/L  --   --   --   --   --   --   --  25   LN-MAGNESIUM mg/dL  --   --  2.2  --  2.3 2.0  < >  --    < > = values in this interval not displayed.    Results from last 7 days  Lab Units 10/26/17  0427 10/25/17  1159 10/24/17  1218  10/21/17  1321   LN-WHITE BLOOD CELL COUNT thou/uL 7.6 9.9 14.3*  < > 3.9*   LN-HEMOGLOBIN g/dL 9.8* 10.2* 10.7*  < > 15.2   LN-HEMATOCRIT % 29.3* 29.6* 31.1*  < > 42.4   LN-PLATELET COUNT thou/uL 178 173 179  < > 195   LN-NEUTROPHILS RELATIVE PERCENT %  --   --   --   --  57    LN-MONOCYTES RELATIVE PERCENT %  --   --   --   --  7   < > = values in this interval not displayed.        Results from last 7 days  Lab Units 10/26/17  1227 10/26/17  0850 10/26/17  0817 10/25/17  2101 10/25/17  1647 10/25/17  1444 10/25/17  1140 10/25/17  0821 10/25/17  0414 10/24/17  2034   LN-POC GLUCOSE FINGERSTICK- HE mg/dL 171 160 145 167 125 154 158 153 168 163       Medications  Scheduled Meds:    influenza vacc high dose (age 65 or >) (PF) 2017-18  0.5 mL Intramuscular Prior to Discharge     insulin aspart (NovoLOG) injection   Subcutaneous TID with meals     insulin aspart (NovoLOG) injection   Subcutaneous QHS     levETIRAcetam (KEPPRA) IVPB  500 mg Intravenous Q12H     lisinopril  20 mg Oral DAILY     niMODipine  60 mg Oral Q4H    Or     niMODipine  60 mg Enteral Tube Q4H     pantoprazole  40 mg Intravenous Q24H     piperacillin-tazobactam (ZOSYN) IV  3.375 g Intravenous Q8H     polyethylene glycol  17 g Oral DAILY     senna-docusate  1 tablet Oral BID     vancomycin  1.5 g Intravenous Q12H     Continuous Infusions:    dexmedetomidine infusion orderable (PRECEDEX) 0.3 mcg/kg/hr (10/25/17 1300)     dextrose 5%-NaCl 0.45% 50 mL/hr (10/26/17 2130)     niCARdipine Stopped (10/25/17 1115)     PRN Meds:.acetaminophen **OR** acetaminophen, bisacodyl, dextrose 50 % (D50W), glucagon (human recombinant), hydrALAZINE, HYDROmorphone, labetalol, magnesium hydroxide, naloxone **OR** naloxone, ondansetron **OR** ondansetron    Pertinent Radiology   Radiology Results: Personally reviewed impression/s      Advanced Care Planning:  Discharge Planning discussed with patient  Total time with this patient is 25 min with greater than 50% of time spent in coordination of care.  Care discussed and coordinated with above.      Pascual Dior MD  Internal Medicine Hospitalist  10/26/2017

## 2021-06-13 NOTE — PROGRESS NOTES
Speech Language/Pathology  Speech Therapy Daily Progress Note    Patient presents as alert and confused during this session.  An  was present.    Objective  Swallow: Meal observation completed with regular textures and thin liquids. Patient had grilled cheese and raw, fresh fruit. His dentures were loose and moving in his oral cavity. Assisted with removing dentures, cleaning them and putting on denture adhesive. Good fit once replaced. Patient had safe and adequate mastication with regular textures once dentures were well-fitting. He had severe decreased attention during session and largely spoke to  with varying intelligibility but he did have adequate attention with mastication (routine).     Cognition: Attempted to determine ability to learn information using hospital, November and 2017. Patient was largely unable to retain this information or repeat it despite numerous cues, trials and review. He was able to respond with 'September or October' x1 when asked the month.     Assessment  Appears to be tolerating regular textures but must have good denture care and fit prior to meals. Patient is demonstrating poor new learning and severely decreased attention. These both limit his progress with goals and potential for improvement.     Plan/Recommendations  Swallow goals have been met. Continue S/L and cog goals with focus on joint attention. Decrease frequency to 3-5x per week due to lack of progress.     new/updated goals    10 speech/language minutes and 20 dysphagia minutes     Lesley Phan MA, CCC-SLP

## 2021-06-13 NOTE — PROGRESS NOTES
NEUROSURGERY POST-OP NOTE:    ASSESSMENT:     Callie Crocker is POD # 12, bleed day #13  status post  LEFT SIDED CRANIOTOMY AND CLIPPING OF MULTIPLE INTRACRANIAL ANEURYSMS, INTRA-OP ANGIOS AND PLACEMENT OF VENTRICULOSTOMY  by Dr. Lisette Espinal.   Complete occlusion of left sided clipped aneurysms. Left MCA aneurysm with clot present, likely to be cause of SAH. Patient still has one unsecured aneurysm on the right.    Periods of waxing and waning alertness.  EEG did not show seizure activity. TCD does not suggest vasospasm.  He looks better everyday but still impulsive.       PLAN:     Patient Active Problem List   Diagnosis     SAH (subarachnoid hemorrhage)     Benign essential HTN     1.   - 160.       Obstructive hydrocephalus     1.  Neuro checks every 4 hours.      2.  HOB elevated.      Aneurysm     Brain edema     Aneurysmal subarachnoid hemorrhage     Nontraumatic subarachnoid hemorrhage     1.  TCDs complete, will not order further.      2.   Monitor wound.       3.   Crani cap and clean gauze to incision.     4.  Patient needs normal glucose for wound healing.      Encephalopathy acute     Seizure-like activity     1.  EEG shows no seizure activity. No further EEG     Hypernatremia     Brain compression      1.  Goal normal sodium at this time.           Discharge Recommendations/Plan:  Barriers to Discharge:   He can discharge from Neurosurgery view. He will need acute rehab.        Follow Up Plan: right unsecured aneurysm will need to be addressed after this event. Follow up with IR versus surveillance monitoring.     Plan TBD by Dr. Espinal, most likely will not need intervention per Dr. Espinal.          HPI: Callie Crocker is status post LEFT SIDED CRANIOTOMY AND CLIPPING OF MULTIPLE INTRACRANIAL ANEURYSMS, INTRA-OP ANGIOS AND PLACEMENT OF VENTRICULOSTOMY  by Dr. Lisette Espinal. He is 72 year old male presented after sudden onset of lightheadedness, dizziness, nausea and  vomiting and syncopal event witnessed by his son. CT in the ED revealed subarachnoid hemorrhage, CTA 4.6 x 3.4 mm anteriorly directed CONCEPCION aneurysm. 4V revealed multiple aneurysms as detailed below. He was taken to the OR for clipping with Dr. Espinal. All four of the left sided aneurysms were clipped and completely occluded, the right sided aneurysm remains unsecured.    SUBJECTIVE:  Lethargy waxes and wanes. Follows commands. Better today.       OBJECTIVE:  Vital signs in last 24 hours  Temp:  [97.5  F (36.4  C)-98.6  F (37  C)] 97.6  F (36.4  C)  Heart Rate:  [51-64] 59  Resp:  [12-34] 14  BP: ()/(56-74) 121/64  Body mass index is 28.4 kg/(m^2).    Mental Status: He is awake, answers more appropriate. Still has random words and train of thought per .   PERRL, following commands with no weakness in any extremity.       Incision: Incision was approximated with staples and covered with dry gauze.  The incision is clean and dry.      Bowel Management:  Prophylaxis yes   Last BM:  Yesterday    Pertinent Labs   Lab Results:   Lab Results   Component Value Date     11/04/2017    K 4.0 11/04/2017     (H) 11/04/2017    CO2 17 (L) 11/04/2017    BUN 25 11/04/2017    CREATININE 0.82 11/04/2017    CALCIUM 9.5 11/04/2017     Lab Results   Component Value Date    WBC 5.3 11/04/2017    HGB 13.0 (L) 11/04/2017    HCT 38.8 (L) 11/04/2017    MCV 91 11/04/2017     11/04/2017       Pertinent Radiology   Radiology Results: Not today    ADDITIONAL COMMENTS:The patient's clinical examination, laboratory data, and plan was discussed with Dr. Lisette Espinal.      Khalida Swanson NP  [facility] Neurosurgery  [phone]

## 2021-06-13 NOTE — PROGRESS NOTES
"[facility] [place]       ASSESSMENT:    Principal Problem:    SAH (subarachnoid hemorrhage)  Active Problems:    Benign essential HTN    Hydrocephalus    Brain edema    Aneurysmal subarachnoid hemorrhage      PLAN:   SAH: s/p craniotomy and clipping of left middle cerebral aneurysm, anterior choroidal aneurysm, left carotid terminus aneurysm, left anterior communicating artery aneurysm, and placement of left frontal ventriculostomy drain 10/22.  -- continue keppra  -- follow for euglycemia, add SSI if needed, ADAT per surgery   -- neurosurgery and ICU teams to follow    HTN:   -- BP sub-optimal. Will start lisinopril 20mg daily, continue scheduled nimodipine   -- will provide further blood pressure control as needed    DVT PPX: SCD    Needs for Discharge: slow clinical improvement, pain management, bowel function, diet progress, testing/consultation and mobility progress     ESTIMATED DISCHARGE:  3 days?  TCU        Valentino Issa D.O.  [phone]            -------------------------------------------------------------------------------------------------------------  SUBJECTIVE: NAD. Denies any complaints. Wants to drink tequila. Slightly confused about place and time.   Exam:  /64  Pulse 79  Temp 100  F (37.8  C) (Oral)   Resp 13  Ht 5' 8\" (1.727 m)  Wt 208 lb 12.4 oz (94.7 kg)  SpO2 92%  BMI 31.74 kg/m2  General: NAD  RESPIRATORY: Clear to auscultation   CARDIOVASCULAR: S1, S2, without murmur. No le edema bilat.   ABDOMEN: soft and non-tender  MUSCULOSKELETAL: Head dressing in place  NEUROLOGIC: Non focal, motor and sensory intact, speech clear     Diagnostics Reviewed:      Recent Results (from the past 24 hour(s))   POCT Glucose    Collection Time: 10/22/17  2:57 PM   Result Value Ref Range    Glucose,  mg/dL   POCT Glucose    Collection Time: 10/22/17  4:05 PM   Result Value Ref Range    Glucose,  mg/dL   POCT Glucose    Collection Time: 10/22/17  8:56 PM "   Result Value Ref Range    Glucose,  mg/dL   POCT Glucose    Collection Time: 10/22/17 11:44 PM   Result Value Ref Range    Glucose,  mg/dL   POCT Glucose    Collection Time: 10/23/17  3:52 AM   Result Value Ref Range    Glucose,  mg/dL   Basic Metabolic Panel    Collection Time: 10/23/17  4:53 AM   Result Value Ref Range    Sodium 137 136 - 145 mmol/L    Potassium 3.7 3.5 - 5.0 mmol/L    Chloride 110 (H) 98 - 107 mmol/L    CO2 19 (L) 22 - 31 mmol/L    Anion Gap, Calculation 8 5 - 18 mmol/L    Glucose 177 (H) 70 - 125 mg/dL    Calcium 8.0 (L) 8.5 - 10.5 mg/dL    BUN 21 8 - 28 mg/dL    Creatinine 0.88 0.70 - 1.30 mg/dL    GFR MDRD Af Amer >60 >60 mL/min/1.73m2    GFR MDRD Non Af Amer >60 >60 mL/min/1.73m2   HM2(CBC w/o Differential)    Collection Time: 10/23/17  4:53 AM   Result Value Ref Range    WBC 14.9 (H) 4.0 - 11.0 thou/uL    RBC 3.75 (L) 4.40 - 6.20 mill/uL    Hemoglobin 11.6 (L) 14.0 - 18.0 g/dL    Hematocrit 32.7 (L) 40.0 - 54.0 %    MCV 87 80 - 100 fL    MCH 30.9 27.0 - 34.0 pg    MCHC 35.5 32.0 - 36.0 g/dL    RDW 13.9 11.0 - 14.5 %    Platelets 181 140 - 440 thou/uL    MPV 9.7 8.5 - 12.5 fL   Magnesium    Collection Time: 10/23/17  4:53 AM   Result Value Ref Range    Magnesium 1.8 1.8 - 2.6 mg/dL   POCT Glucose    Collection Time: 10/23/17  8:14 AM   Result Value Ref Range    Glucose,  mg/dL

## 2021-06-13 NOTE — PROGRESS NOTES
Speech Language/Pathology  Speech Therapy Daily Progress Note    Patient presents as alert and confused during this session. Patient has bilateral wrist restraints for safety.  An  was present via language line.    Objective  Swallowing: Patient consumed 1 pill whole with water, 3 crushed pills with applesauce, 1/2 tay cracker square and 6 oz. Water by cup or straw. Note intermittent phlegmy vocal quality with larger sips but no coughing or throat clearing occurred. Note mild lingual residue which cleared with subsequent swallows of water. Note difficulty swallowing pill with patient chewing the pill (RN crushed remaining pills). Swallow appeared somewhat incoordinated and audible swallows noted intermittently.   Cognitive-communication: Patient scored 32/100 on Memory Orientation and Amnesia Test (MOAT) which falls in the severe range. Patient not oriented to place or time information (except year) and not able to recall 3 words after short delay with distractor. Note frequent perseverative and confused responses.    Assessment  Patient appears to be tolerating regular diet and thin liquids given 1:1 supervision and swallow precautions. Patient continues with significant confusion.    Plan/Recommendations  Recommend continue regular diet with thin liquids given 1:1 supervision and swallow precautions (fully upright positioning, small single sips and bites, slow rate, alternate consistencies, check for oral pocketing). Please crush pills and serve with applesauce.    current goals remain appropriate    20 speech/language minutes and 10 dysphagia minutes     Sunshine Hong MA, CCC-SLP     Addendum: RN reported patient pocketing scrambled eggs at breakfast after session today. Recommend continued 1:1 supervision with all p.o. And monitor for oral pocketing. Patient to alternate liquids and solids. May consider downgrading diet to mechanical soft as indicated. Will f/u for diet tolerance and  swallow strategies.

## 2021-06-13 NOTE — OP NOTE
DATE OF SERVICE: 10/22/2017    DATE OF SERVICE:  ??_____    PREOPERATIVE DIAGNOSES:    1.  Ruptured intracerebral aneurysm.    2.  Multiple intracerebral aneurysms.    3.  Hydrocephalus.      POSTOPERATIVE DIAGNOSES:    1.  Ruptured intracerebral aneurysm.    2.  Multiple intracerebral aneurysms.    3.  Hydrocephalus.      PROCEDURE:  1.  Left-sided craniotomy clipping of left middle cerebral aneurysm complex  including incorporation of inflow and outflow vessels aneurysm.    2.  Clipping of left anterior choroidal aneurysm complex due to incorporation of  anterior choroidal artery in aneurysm.    3.  Clipping of left carotid terminus aneurysm complex incorporation with carotid  artery and anterior cerebral artery into the aneurysm.  4.  Clipping of left anterior communicating artery aneurysm complex incorporation of  anterior cerebral arteries A1 and A2 into aneurysm.  5.  Placement of left frontal Rosa Maria's point ventriculostomy drain.  6.  Use of intraoperative microscope.  7.  Intraoperative angiogram.      All the above procedures were performed under the same anesthesia.    SURGEON:  Lisette Espinal MD    ASSISTANT:  None.      FINDINGS:  Blood clot was around the left MCA aneurysm, suggestive of this being the  cause of the subarachnoid hemorrhage.  Intraoperative angiogram performed by Dr. Oren Chapin confirming complete occlusion of all 4 left anterior circulation  aneurysms and patency of normal inflow and outflow vessels.    ESTIMATED BLOOD LOSS:  150 mL    ANESTHESIA:  General endotracheal anesthesia.    COMPLICATIONS:  None.    HISTORY AND PHYSICAL:  Callie Ocampo is a 72-year-old with ruptured aneurysm.   The above procedure was indicated.  His left MCA aneurysm was not favorable for  coiling.  The risks, benefits, alternatives were explained to him and his daughter  and 2 sons at length by myself.  Informed consent was obtained both verbally and in  writing and placed in the  chart.    DESCRIPTION OF PROCEDURE:  Patient was brought into operating room.  He was  intubated and sedated in usual fashion.  Arterial line was inserted by anesthesia.   IV lines were obtained by anesthesia.  Preoperative antibiotics, Keppra, Decadron  and mannitol was given.  A timeout was called and completed.  We prepped and draped  the patient in the usual sterile fashion.  We did affix him with Chávez to the  table prior to prepping and draping.  We made a pterional incision and turned a  pterional bone flap with the drill and a craniotome.  We then opened the dura in a  curvilinear fashion reflected anteriorly.  We, prior to opening the dura, did drill  down the lesser wing of the sphenoid.  We then brought in the microscope.  Under  microscopic magnification, we used dynamic retraction with the Dolan retractors  and split the fissure from proximal to distal.  We had proximal control of M1 and  the M2.  We irrigated and evacuated the clot.  We could see the aneurysm.  We put a  temporary clip for about 3.5 minutes on the M1 proximal aneurysm.  We then clipped  the aneurysm.  We then removed the proximal temporary clip.  We then dissected out  the anterior choroidal aneurysm and placed a clip on this.  We then dissected out  the carotid terminus aneurysm.  We waited and clipped this aneurysm last.  We then  dissected out the anterior communicating artery aneurysm.  We coagulated and did a  subpial partial resection of the left gyrus rectus to be able to expose the aneurysm  and the distal outflow of the aneurysm better.  While we were dissecting the  aneurysm, we had an intraoperative rupture.  We did have contralateral A1 and  ipsilateral A1 identified at this point prior to dissecting the aneurysm and placed  temporary clips on these.  We then clipped the aneurysm.  We then looked around and  dissected the aneurysm neck and could see that the aneurysm was completely  obliterated with inflow and  outflow vessels intact.  We then clipped the carotid  terminus aneurysm with a right angle clip.  We then copiously irrigated the wound  and obtained an intraoperative angiogram confirming obliteration of all 4 aneurysms  and intact patency of the inflow and outflow vessels.  We then copiously irrigated  the wound and closed the dura in a watertight fashion with 4-0 Nurolon and placed  Duragen on top of this.  We placed a tack up suture centrally through the dura and  through bone flap and secured the bone flap.  We replaced the bone flap with  titanium plates and screws with low KLS Ellis system.  Prior to closing the dura,  we made a small opening a Rosa Maria's point and passed a ventriculostomy drain about 5  cm into the left frontal horn.  We then tunneled this away from our incision.  We  then closed and replaced the bone flap.  We secured the drain in place with silk  sutures.  We then closed the temporalis and scalp in layers using 0, 2-0 and 3-0  Vicryls and staples for the skin.  We then dressed the incision with bacitracin,  Telfa and Kerlix.  Patient was extubated and taken to PACU in stable neurologic  condition.  All counts were correct at the end of our procedure.      ANATOLIY ASHRAF MD  ca  D 10/22/2017 13:47:02  T 10/22/2017 14:33:46  R 10/22/2017 14:33:46  [id number]        cc:JOSELIN LAWRENCE MD

## 2021-06-13 NOTE — CONSULTS
Critical Care Medicine Consultation             Date of Service: 10/21/2017    Reason for Consult: ICH    HPI: This is a 72 y.o. male with no pmh who was admitted after a syncopal event when he was shopping today. Apparently, he had experienced sudden onset dizziness and lightheadedness associated with nausea and vomiting, followed, by a syncopal episode.  This was witnessed by patient's father who was accompanying him.  In route, patient vomited in the ambulance.  He was noted to be incontinent of urine.  In the ED, he underwent a CT scan of the head that showed acute subarachnoid hemorrhage centered within the basal cisterns.  This was followed by a CTA that revealed 4.6 x 3.4 mm anteriorly directed anterior communicating artery aneurysm.  Patient underwent cerebral angiogram that revealed multiple intracranial aneurysms.  Decision was then made to undergo open surgical exploration and clipping.    History reviewed. No pertinent past medical history.  No past surgical history on file.  No Known Allergies  Prescriptions Prior to Admission   Medication Sig Dispense Refill Last Dose     doxylamine (UNISON) 25 mg tablet Take 25 mg by mouth at bedtime as needed for sleep.   Unknown at Unknown time     Social History     Social History     Marital status:      Spouse name: N/A     Number of children: N/A     Years of education: N/A     Social History Main Topics     Smoking status: None     Smokeless tobacco: None     Alcohol use None     Drug use: None     Sexual activity: Not Asked     Other Topics Concern     None     Social History Narrative    He is mainly [place]-speaking.  He does have grown children.     No family history on file.  Review of Systems  Unable to be obtained due to critical illness and altered mental status  10 point review of system negative except that stated in the HPI.      Vitals  BP  Min: 121/58  Max: 192/84  Temp  Min: 97.9  F (36.6  C)  Max: 98.4  F (36.9  C)  Pulse  Min: 68  Max:  114  Resp  Min: 4  Max: 22  SpO2  Min: 93 %  Max: 99 %    Intake/Output Summary (Last 24 hours) at 10/21/17 1956  Last data filed at 10/21/17 1928   Gross per 24 hour   Intake             1365 ml   Output              600 ml   Net              765 ml             Physical Exam:  Gen: Lying in bed, no acute distress. Non-toxic. Non-diaphoretic. Awake  HEENT: Normocephalic/atraumatic. Mucous membranes. OP patent.  Pulm: Normal work of breathing. Lungs clear to auscultation bilaterally anteriorly   Chest: Equal chest rise, normal conformation.  CV: RRR, S1, S2, no murmurs, rubs, gallops.   Abd: Soft, nontender, non-distended, bowel sounds present. No guarding, rebound tenderness or rigidity.  Ext: Overall warm and perfused, no clubbing, cyanosis, or edema  Neuro: no facial asymmetry, cn 2-12 grossly intact   Skin: Warm, dry.    Diagnostic Data: Reviewed independently by me.    Results from last 7 days  Lab Units 10/21/17  1321   LN-WHITE BLOOD CELL COUNT thou/uL 3.9*   LN-HEMOGLOBIN g/dL 15.2   LN-HEMATOCRIT % 42.4   LN-PLATELET COUNT thou/uL 195   LN-NEUTROPHILS RELATIVE PERCENT % 57   LN-MONOCYTES RELATIVE PERCENT % 7       Results from last 7 days  Lab Units 10/21/17  1321   LN-SODIUM mmol/L 138   LN-POTASSIUM mmol/L 4.1   LN-CHLORIDE mmol/L 106   LN-CO2 mmol/L 25   LN-BLOOD UREA NITROGEN mg/dL 12   LN-CREATININE mg/dL 0.79   LN-CALCIUM mg/dL 9.1   LN-PROTEIN TOTAL g/dL 7.4   LN-BILIRUBIN TOTAL mg/dL 0.8   LN-ALKALINE PHOSPHATASE U/L 110   LN-ALT (SGPT) U/L 24   LN-AST (SGOT) U/L 25       Lab Results   Component Value Date    CALCIUM 9.1 10/21/2017        Results from last 7 days  Lab Units 10/21/17  1321   LN-INR  1.04     No results found for: HGBA1C        Imaging:     Cta Head And Neck    Result Date: 10/21/2017  [facility] CTA HEAD AND NECK 10/21/2017 3:22 PM INDICATION: Subarachnoid hemorrhage TECHNIQUE: Axial helical CT images of the head and neck vessels obtained during the arterial phase of  intravenous contrast administration. Axial helical 2D reconstructed images and multiplanar 3D MIP reconstructed images of the head and neck vessels were  performed by the technologist. Dose reduction techniques were used. CONTRAST: Iohexol (Omni) 90mL COMPARISON: Head CT 10/21/2017 FINDINGS: HEAD CTA: 4.6 x 3.4 mm anteriorly directed anterior communicating artery aneurysm. The neck of this aneurysm measures 1.5 mm. This correlates with the patient's subarachnoid hemorrhage. No other aneurysm. No stenosis. Dominant right vertebral artery. PICA termination of a developmentally small left vertebral artery. NECK CTA: RIGHT CAROTID: No measurable stenosis in the right ICA based on NASCET criteria. Mild stenosis in the mid right common carotid. LEFT CAROTID: No measurable stenosis in the left ICA based on NASCET criteria. VERTEBRAL ARTERIES: Dominant right and smaller left vertebral arteries are patent in the neck and into the head. AORTIC ARCH: Evaluation of the arch is degraded by artifact. Evidence of an aortic left vertebral origin. This is incidental. MISCELLANEOUS: No evidence for dissection or pseudoaneurysm. The visualized upper chest is unremarkable. Diffuse coarse bridging marginal osteophytes consistent with DISH.     CONCLUSION: HEAD CTA: 1.  4.6 x 3.4 mm anteriorly directed anterior communicating artery aneurysm. It demonstrates a 1.5 mm neck. 2.  No other aneurysm. 3.  No stenosis. 4.  Left vertebral artery PICA termination. NECK CTA: 1.  No significant stenosis in the neck vessels based on NASCET criteria. 2.  No evidence for dissection. 3.  Evidence of an aortic origin of left vertebral artery.    Ct Head Without Contrast    Result Date: 10/21/2017  [facility] CT HEAD WITHOUT CONTRAST 10/21/2017, 1:44 PM INDICATION: Headache, acute, severe, thunderclap, worst headache of life. TECHNIQUE: Without IV contrast. Dose reduction techniques were used. CONTRAST: None. COMPARISON:  None. FINDINGS: Acute  "subarachnoid hemorrhage centered within the suprasellar cistern with anterior extension along the interhemispheric fissure, lateral extension into the sylvian fissures (left more than right), and inferior extension along the prepontine cistern. Small amount of intraventricular hemorrhage within the occipital horn of the right lateral ventricle. Mild prominence of the temporal horns. The ventricles are otherwise unremarkable. Mild generalized atrophy. Indeterminate calcification within the right posterior frontal lobe. The calvarium and skull base are intact. The orbits, paranasal sinuses, and mastoids are unremarkable.     CONCLUSION: 1.  Acute subarachnoid hemorrhage centered within the basal cisterns. Recommend CT angiogram to evaluate for aneurysm. 2.  Small amount of intraventricular hemorrhage and mild prominence of the temporal horns suggesting early hydrocephalus. The findings were discussed with Dr. Stanley at 1355 hours on 10/21/2017.      ekg with nsr      ASSESSMENT:       This is a 72 y.o. male with no significant past medical history who was admitted with syncope, nausea, vomiting, lightheadedness and found to have acute subarachnoid hemorrhage on CT scan followed by a CTA that revealed CONCEPCION aneurysm.  Cerebral angiogram revealed \"Multiple intracranial aneurysms: ACOM (6 mm), L anterior choroidal (3.5 mm), L carotid terminus (2.5 mm), L MCA bifurcation (2.4 mm), R anterior choroidal (1.7 mm)\"       PLAN/RECOMMENDATIONS:    Neuro: syncope 22/ SAH, multiple intracranial aneursyms  - goal bp < 140  - start AED  - elevated head of bed  - neuro check q1h  - PT/INR/Platelet function  - elevated HOB  - Neuro/NSGY consults    Pulmonary: on room air  - Monitor respiratory status closely. Bronchodilators as needed.    Cardiovascular: hemodynamically stable  - goal bp < 140  - labetalol/nicardipine if needed    GI:   - Prophylaxis with PPI     Heme/Onc:   - mechanical devices    Endocrine:  - Insulin as needed to " keep glucose <180. Avoid hypoglycemia.    Code Status: Full Code    This patient is critically ill and remains at risk for further deterioration, organ failure, and/or death.     Critical Care Time: 50 minutes, not including separately billable procedures    Jensen Garcia MD  Critical Care Medicine

## 2021-06-13 NOTE — ANESTHESIA CARE TRANSFER NOTE
Last vitals:   Vitals:    10/22/17 1343   BP: 123/50   Pulse: 80   Resp: 20   Temp: 36.7  C (98.1  F)   SpO2: 99%     Patient's level of consciousness is drowsy  Spontaneous respirations: yes  Maintains airway independently: yes  Dentition unchanged: yes  Oropharynx: oral airway in place       QCDR Measures:  ASA# 20 - Surgical Safety Checklist: WHO surgical safety checklist completed prior to induction   PQRS# 430 - Adult PONV Prevention: 4558F-8P - Pt did NOT receive => 2 anti-emetic agents  ASA# 8 - Peds PONV Prevention: 4558F-1P - Medical reason for NOT administering combination therapy  PQRS# 424 - Elena-op Temp Management: 4559F - At least one body temp DOCUMENTED => 35.5C or 95.9F within required timeframe  PQRS# 426 - PACU Transfer Protocol: - Transfer of care checklist used  ASA# 14 - Acute Post-op Pain: ASA14B - Patient did NOT experience pain >= 7 out of 10

## 2021-06-13 NOTE — PROCEDURES
[facility] Radiology Post Procedure    Procedure: Diagnostic cerebral angiogram    Radiologist: Byron Sawant    Contrast: 180 mL omnipaque 300, 50 mL visipaque 320  Fluoro time: 14.4 minutes  Fluoro dose: 2974 mGy    Medications: General anesthesia, see anesthesiology/nursing notes     EBL: minimal    Complications: None evident    Preliminary findings: (see dictation for full detail)  - Multiple intracranial aneurysms: ACOM (6 mm), L anterior choroidal (3.5 mm), L carotid terminus (2.5 mm), L MCA bifurcation (2.4 mm), R anterior choroidal (1.7 mm)    Assess/Plan:   Report to ordering provider.  Bedrest for 4 hours.  Given multiplicity of aneurysms and uncertainty of rupture source, open surgical exploration/clipping to be performed tomorrow morning.    Byron Sawant

## 2021-06-13 NOTE — PROGRESS NOTES
Hospitalist Progress Note    Assessment/Plan  73 yo M with h/o hypertension who presented 10/21/17 after a witnessed syncopal event. He had sudden onset dizziness, light headedness, nausea and vomiting. He was taken to ER, CT scan showed acute subarachnoid hemorrhage centered within the basal cisterns; CTA revealed 4.6 x 3.4 mm anteriorly directed CONCEPCION aneurysm. Cerebral angiogram revealed multiple intracranial aneurysms. He underwent emergent left sided crani and clipping of left MCA, left anterior choroidal, left carotid terminus, left CONCEPCION; and placement of ventriculostomy by Dr. RENO Espinal. There remains one unsecured aneurysm on the right.  He has been in ICU getting close monitoring since admission without any signs of vasospasm on TCD's.       Principal Problem:    SAH (subarachnoid hemorrhage) - secondary to ruptured cerebral aneurysm s/p crani with multiple clips    Hydrocephalus - with external ventricular drain in place. Management per Neurosurgery      Active Problems:    Fevers - possibly central. Urine Cx negative. Procalcitonin negative. On Zosyn empirically    Benign essential HTN - keep -160 with nimodipine,labetalol and lisinopril    Brain edema - keep sodium in normal to upper normal range    Encephalopathy acute - supportive management, follow    Seizure-like activity - on keppra x 30 days per Neurosurgery       Barriers to Discharge : ventriculostomy   Anticipated Discharge :TBD  Disposition :TBD      Subjective  Drowsy today.Remains confused but no agitation noted.Tmax 100.8 last night       Objective    Vital signs in last 24 hours  Temp:  [98.7  F (37.1  C)-100.8  F (38.2  C)] 98.9  F (37.2  C)  Heart Rate:  [61-91] 70  Resp:  [11-36] 20  BP: (109-181)/(55-91) 131/62 93% O2 Device: None (Room air) O2 Flow Rate (L/min): 15 L/min  Weight:   217 lb 13 oz (98.8 kg) Weight change: 12 lb 2 oz (5.5 kg)    Intake/Output last 3 shifts  I/O last 3 completed shifts:  In: 1289 [P.O.:360; I.V.:829;  IV Piggyback:100]  Out: 3499 [Urine:3325; Drains:174]  Intake/Output this shift:  I/O this shift:  In: -   Out: 20 [Drains:20]    Physical Exam:  GENERAL: Drowsy but opens his eyes and answers some questions   HEENT: Head dressings are clean, intact, ventriculostomy noted   CARDIAC: Regular rate & rhythm, S1 & S2 normal.  No gallops or murmurs. No edema  LUNGS: Course breath sounds bilaterally  ABDOMEN: Soft, non-tender, bowel sounds present all quadrants  NEURO: Drowsy, confused       Pertinent Labs   Lab Results: personally reviewed.     Results from last 7 days  Lab Units 10/28/17  0440 10/27/17  1116 10/27/17  0411  10/26/17  0427  10/21/17  1321   LN-SODIUM mmol/L 141 144 145  < > 150*  < > 138   LN-POTASSIUM mmol/L 3.8 4.0 3.6  --  4.1  < > 4.1   LN-CHLORIDE mmol/L 113*  --  117*  --  127*  < > 106   LN-CO2 mmol/L 23  --  22  --  19*  < > 25   LN-BLOOD UREA NITROGEN mg/dL 10  --  11  --  21  < > 12   LN-CREATININE mg/dL 0.71  --  0.78  --  0.84  < > 0.79   LN-CALCIUM mg/dL 8.3*  --  8.2*  --  8.2*  < > 9.1   LN-ALBUMIN g/dL  --   --   --   --   --   --  3.7   LN-PROTEIN TOTAL g/dL  --   --   --   --   --   --  7.4   LN-BILIRUBIN TOTAL mg/dL  --   --   --   --   --   --  0.8   LN-ALKALINE PHOSPHATASE U/L  --   --   --   --   --   --  110   LN-ALT (SGPT) U/L  --   --   --   --   --   --  24   LN-AST (SGOT) U/L  --   --   --   --   --   --  25   LN-MAGNESIUM mg/dL 1.9 1.9 2.0  --  2.2  < >  --    < > = values in this interval not displayed.    Results from last 7 days  Lab Units 10/28/17  0440 10/27/17  0411 10/26/17  0427  10/21/17  1321   LN-WHITE BLOOD CELL COUNT thou/uL 6.1 7.0 7.6  < > 3.9*   LN-HEMOGLOBIN g/dL 10.1* 10.1* 9.8*  < > 15.2   LN-HEMATOCRIT % 29.4* 29.5* 29.3*  < > 42.4   LN-PLATELET COUNT thou/uL 199 178 178  < > 195   LN-NEUTROPHILS RELATIVE PERCENT %  --   --   --   --  57   LN-MONOCYTES RELATIVE PERCENT %  --   --   --   --  7   < > = values in this interval not displayed.         Medications  Scheduled Meds:    influenza vacc high dose (age 65 or >) (PF) 2017-18  0.5 mL Intramuscular Prior to Discharge     insulin aspart (NovoLOG) injection   Subcutaneous TID with meals     insulin aspart (NovoLOG) injection   Subcutaneous QHS     labetalol  100 mg Oral TID     levETIRAcetam  500 mg Oral BID     [START ON 10/29/2017] lisinopril  40 mg Oral DAILY     niMODipine  60 mg Oral Q4H    Or     niMODipine  60 mg Enteral Tube Q4H     pantoprazole  40 mg Oral QAM AC     piperacillin-tazobactam (ZOSYN) IV  3.375 g Intravenous Q8H     polyethylene glycol  17 g Oral DAILY     senna-docusate  1 tablet Oral BID     Continuous Infusions:    niCARdipine Stopped (10/25/17 1115)     nacl 0.9% 25 mL/hr (10/28/17 0924)     PRN Meds:.acetaminophen **OR** acetaminophen, bisacodyl, dextrose 50 % (D50W), glucagon (human recombinant), hydrALAZINE, HYDROmorphone, labetalol, magnesium hydroxide, naloxone **OR** naloxone, ondansetron **OR** ondansetron    Pertinent Radiology   Radiology Results: Personally reviewed image/s    Advanced Care Planning:  Treatment/Discharge Planning discussed with the patient, family    Total time with this patient is 25 minutes with greater than 50% of time spent in coordination of care.      Polina Bailey MD  Internal Medicine Hospitalist  10/28/2017

## 2021-06-13 NOTE — PROGRESS NOTES
NEUROSURGERY POST-OP NOTE:    ASSESSMENT:     Callie Crocker is post bleed day 16, post operative day 15 status post  LEFT SIDED CRANIOTOMY AND CLIPPING OF MULTIPLE INTRACRANIAL ANEURYSMS, INTRA-OP ANGIOS AND PLACEMENT OF VENTRICULOSTOMY  by Dr. Lisette Espinal. From neurosurgical perspective he could discharge to [facility] at any point. Communicated this to care management team.         PLAN:   Patient Active Problem List   Diagnosis     Benign essential HTN  Target -160       Obstructive hydrocephalus     Aneurysm     Brain edema     Aneurysmal subarachnoid hemorrhage     Nontraumatic subarachnoid hemorrhage  Target normal blood glucose to promote wound healing  Monitor incisional site   Crani cap over incision at all times  Keep hands away from wound  Continue PT and OT       Encephalopathy acute     Seizure-like activity  EEGs have been negative for seizure, no further EEGs  Continue Keppra as recommended by neurology          Discharge Recommendations/Plan:  Barriers to Discharge: He can discharge from Neurosurgery view. He will need acute rehab.      Follow Up Plan: 2 weeks with repeat head CT, additional follow up per Dr. Lisette Espinal, will need surveillance imaging of unsecure aneurysm on the right versus IR coiling         HPI: HPI: Callie Crocker is status post LEFT SIDED CRANIOTOMY AND CLIPPING OF MULTIPLE INTRACRANIAL ANEURYSMS, INTRA-OP ANGIOS AND PLACEMENT OF VENTRICULOSTOMY  by Dr. Lisette Espinal. He is 72 year old male presented after sudden onset of lightheadedness, dizziness, nausea and vomiting and syncopal event witnessed by his son. CT in the ED revealed subarachnoid hemorrhage, CTA 4.6 x 3.4 mm anteriorly directed CONCEPCION aneurysm. 4V revealed multiple aneurysms as detailed below. He was taken to the OR for clipping with Dr. Espinal. All four of the left sided aneurysms were clipped and completely occluded, the right sided aneurysm remains unsecured. Ventriculostomy was  removed 11/1/2017 after weaning and tolerating clamping of the ventric for two days. Postop course has been complicated by fluctuating mental status, social issues, and high risk for vasospasm.    SUBJECTIVE:  Azeri speaking RN reports he continues to be confused, unable to say where he is. Patient denies pain.      OBJECTIVE:  Vital signs in last 24 hours  Temp:  [97.1  F (36.2  C)-98.6  F (37  C)] 97.1  F (36.2  C)  Heart Rate:  [58-81] 71  Resp:  [16-21] 18  BP: ()/(47-68) 133/63  Body mass index is 28.4 kg/(m^2).    Mental Status: Napping, easily awakens to verbal stimulation. Oriented to self. Did tell RN the correct year earlier today.  Cranial Nerves: PERRL, EOMI, face symmetric  Motor Strength:  Spontaneously moving all extremities with equal strength and coordination.    Incision: Incision was approximated with staples and covered with dry gauze. Dressing was changed, staples are intact. Incision is well approximated. There is a fluctuating fluid collection anterior to the ear/ and on L temple. Consistent with subgaleal CSF. No redness or drainage.    Urinary catheter out since 10/31/2017 3 consecutive bladder scans were all < 300 ml    Pertinent Labs   Lab Results:   CBC:   Lab Results   Component Value Date    WBC 5.4 11/06/2017    RBC 4.12 (L) 11/06/2017     BMP:   Lab Results   Component Value Date    CO2 15 (L) 11/06/2017    BUN 26 11/06/2017    CREATININE 0.84 11/06/2017    CALCIUM 9.4 11/06/2017       Pertinent Radiology   Radiology Results: None arabella Carlson [facility]  [address] [place] [address], [address]  [place] [address]    O: [phone]  P: [phone]

## 2021-06-13 NOTE — PROGRESS NOTES
NEUROLOGY PROGRESS NOTE     ASSESSMENT & PLAN   Hospital Day#: 14    impression: Subarachnoid hemorrhage/status post clipping of left-sided aneurysms.  Neurologic exam appears nonfocal at this time and will follow simple commands by mimicry.  Speech sounds fluent.  No seizures noted.  On Keppra.        Patient Active Problem List    Diagnosis Date Noted     Brain compression 10/31/2017     Hypernatremia 10/26/2017     Seizure-like activity 10/25/2017     Nontraumatic subarachnoid hemorrhage 10/24/2017     Encephalopathy acute 10/24/2017     Brain edema 10/23/2017     Aneurysmal subarachnoid hemorrhage 10/23/2017     Aneurysm 10/22/2017     SAH (subarachnoid hemorrhage) 10/21/2017     Benign essential HTN      Obstructive hydrocephalus      Past Medical History: Patient  has no past medical history on file.     SUBJECTIVE     Schorn seen in follow-up for Dr. Mcduffie.  Note that patient did have a fall yesterday.  Patient status post clipping of multiple left-sided aneurysms.  Left MCA aneurysm appears to be nidus for subarachnoid hemorrhage, per review of operative report     OBJECTIVE     Vital signs in last 24 hours  Temp:  [97.5  F (36.4  C)-98.6  F (37  C)] 97.6  F (36.4  C)  Heart Rate:  [51-64] 59  Resp:  [12-34] 14  BP: ()/(56-74) 121/64    Weight:   186 lb 12.8 oz (84.7 kg)    I/O last 24 hours  No intake or output data in the 24 hours ending 11/04/17 0905    Review of Systems   Unable    General Physical Exam: Patient is alert . Vital signs were reviewed and are documented in EMR.   Neurological Exam:  Patient is alert.  Speech appears fluent.  Will follow simple commands by mimicry.  Face symmetric.  Tongue in the midline.  Will look to the left and right without difficulties.  Pupils are 4 mm and reactive.  Does appear to see movement in all 4 rents of vision.  Squeeze with both hands.  Finger tapping down equally.  Will lift legs antigravity.  Babinski downgoing.  Gait: Deferred     DIAGNOSTIC STUDIES      Pertinent Radiology   Radiology Results: Xr Humerus Left 2 Vws Portable    Result Date: 11/3/2017  XR HUMERUS LEFT PORTABLE 11/3/2017 3:57 PM  INDICATION: Patient pulled PICC line out. COMPARISON: None. FINDINGS: No PICC line catheter is identified. No fracture or dislocation. There is bony hypertrophy involving the coronoid process of the ulna.      Pertinent Labs   Lab Results: Personally reviewed  Recent Results (from the past 24 hour(s))   POCT Glucose    Collection Time: 11/03/17 11:51 AM   Result Value Ref Range    Glucose,  mg/dL   POCT Glucose    Collection Time: 11/03/17  4:38 PM   Result Value Ref Range    Glucose,  mg/dL   POCT Glucose    Collection Time: 11/03/17  8:07 PM   Result Value Ref Range    Glucose,  mg/dL   Basic Metabolic Panel    Collection Time: 11/04/17  6:06 AM   Result Value Ref Range    Sodium 140 136 - 145 mmol/L    Potassium 4.0 3.5 - 5.0 mmol/L    Chloride 113 (H) 98 - 107 mmol/L    CO2 17 (L) 22 - 31 mmol/L    Anion Gap, Calculation 10 5 - 18 mmol/L    Glucose 147 (H) 70 - 125 mg/dL    Calcium 9.5 8.5 - 10.5 mg/dL    BUN 25 8 - 28 mg/dL    Creatinine 0.82 0.70 - 1.30 mg/dL    GFR MDRD Af Amer >60 >60 mL/min/1.73m2    GFR MDRD Non Af Amer >60 >60 mL/min/1.73m2   HM2(CBC W/O DIFF)    Collection Time: 11/04/17  6:06 AM   Result Value Ref Range    WBC 5.3 4.0 - 11.0 thou/uL    RBC 4.25 (L) 4.40 - 6.20 mill/uL    Hemoglobin 13.0 (L) 14.0 - 18.0 g/dL    Hematocrit 38.8 (L) 40.0 - 54.0 %    MCV 91 80 - 100 fL    MCH 30.6 27.0 - 34.0 pg    MCHC 33.5 32.0 - 36.0 g/dL    RDW 14.9 (H) 11.0 - 14.5 %    Platelets 428 140 - 440 thou/uL    MPV 10.2 8.5 - 12.5 fL   Magnesium    Collection Time: 11/04/17  6:06 AM   Result Value Ref Range    Magnesium 2.2 1.8 - 2.6 mg/dL   POCT Glucose    Collection Time: 11/04/17  8:43 AM   Result Value Ref Range    Glucose,  mg/dL         HOSPITAL MEDICATIONS       influenza vacc high dose (age 65 or >) (PF) 2017-18  0.5 mL  Intramuscular Prior to Discharge     insulin aspart (NovoLOG) injection   Subcutaneous TID with meals     insulin aspart (NovoLOG) injection   Subcutaneous QHS     labetalol  100 mg Oral TID     levETIRAcetam  1,000 mg Oral BID     lidocaine (XYLOCAINE) 1 % local injection  10 mL Infiltration Once     lisinopril  40 mg Oral DAILY     niMODipine  60 mg Oral Q4H    Or     niMODipine  60 mg Enteral Tube Q4H     pantoprazole  40 mg Oral QAM AC     polyethylene glycol  17 g Oral DAILY     senna-docusate  1 tablet Oral BID        Total time spent for face to face visit, reviewing labs/imaging studies, counseling and coordination of care was: 25 minute more than 50% of this time was spent on counseling and coordination of care.    Marvin Jasmine MD  [facility], ..  Direct Secure Messaging: [email or web address]  Tel: [phone]

## 2021-06-13 NOTE — PROGRESS NOTES
Communicates well with bedside interuptor and answers questions to the best of his ability..Able to work with Occ and Physical therapy.  Taking fluids when offered, using Incentive spirometer .Speech therapy to see, plans to adv diet to reg.  Few bites lunch but states he is not hungry @ this time.Attempting Boost supplement  Up in chair for almost 3 hrs and appears restless, states he is tired.  Passing flatus,,1 small semi-soft stool on commode.   Cont with soft restraints as still reaches for lines.  Ventricular clamped at 12:45 pm

## 2021-06-13 NOTE — ANESTHESIA PROCEDURE NOTES
Arterial Line  Reason for Procedure: hemodynamic monitoring and multiple ABGs  Patient location during procedure: Pre-op  Start time: 10/22/2017 8:21 AM  End time: 10/22/2017 8:26 AM  Staffing:  Performing  Anesthesiologist: ILAN FLORES  Performing CRNA: IVAN CARBAJAL  Sterile Precautions:  sterile barriers used during insertion: cap, mask, sterile gloves, large sheet, and hand hygiene used.  Arterial Line:   Immediately prior to procedure a time out was called to verify the correct patient, procedure, equipment, support staff and site/side marked as required  Laterality: right  Location: radial  Prepped with: ChloroPrep    Needle gauge: 20 G  Number of Attempts: 1  Secured with: tape and transparent dressing (sutured)  Flushed with: saline  1% lidocaine local anesthesia used for skin prep.   See MAR for additional medications given.  Ultrasound evaluation of access site: yes  Vessel patent by US exam    Concurrent real time visualization of needle entry    Additional Notes:  No issues.  + waveform.  biopatch placed

## 2021-06-13 NOTE — PROGRESS NOTES
Speech Language/Pathology  Speech Therapy Daily Progress Note    Patient presents as awake and confused during this session.  An  was present. Patient's son present upon arrival.     Patient made NPO this AM d/t emesis.     Objective  Repeat BSS (essentially):  Thin- 3 sips via spoon; delayed cough, throat clear and audible swallow (2/3 trials) noted immediately and delayed. Swallow appeared mildly delayed. Difficult to determine hyolaryngeal movement d/t large neck mass and positioning. Anterior loss noted 1x.     Nectar- 2 sips via spoon; delayed cough, throat clear and audible swallow noted (1/2 trials). Swallow appeared mildly delayed. Difficult to determine hyolaryngeal movement d/t large neck mass and positioning.     Honey- 3 oz via spoon; delayed throat clear 2x and inconsistent audible swallow. Swallow appeared timely. Difficult to determine hyolaryngeal movement d/t large neck mass and positioning.     Puree- 4 oz via spoon; no s/s of aspiration. Swallow appeared timely. Difficult to determine hyolaryngeal movement d/t large neck mass and positioning. Patient endorsed sensation of stasis 1x which cleared with commands to execute dry swallow.     Education was provided to patient, patient's son, and RNs and 1:1 assistant re: safe swallow strategies and current plan.     Assessment  Patient highly confused and disoriented this AM. Patient noted to have significant, relatively consistent, s/s of aspiration with nectar and thin. Minimal s/s noted with honey and no s/s noted with puree. Patient did not appear overly interested in PO; suspect minimal intake. Meds to be crushed in applesauce; RN aware.    Plan/Recommendations  Re-initiate oral diet of NDD1 with honey thick liquids via spoon and re-evaluate first thing tomorrow morning. If concerns persist re: safety of swallow function, VFSS would be warranted as patient able to tolerate.     If any concerns re: swallow safety and/or respiratory status  arise, initiate NPO and contact ST immediately.     new/updated goals    35 dysphagia minutes     Desi Urena MS, CCC-SLP

## 2021-06-13 NOTE — PROGRESS NOTES
"Speech Language/Pathology  Speech Therapy Daily Progress Note    Patient presents as cooperative and awake during this session.  An  was present.    Objective    Pt was refusing lunch items upon arrival, stating that he was not hungry. Provided edu regarding pureed vs. Regular textures and pt was receptive to trial. He had 6 bites of regular and 4oz of thin and presented with no s/s aspiration. Mastication was adequate and pt cleared stasis with lingual sweep. He stated that the cracker tasted \"really good\".     Assessment    Pt tolerated regular textures with thin liquids with no s/s aspiration or oral dysphagia. Pt is still 1:1 assist and this should continue with PO intake.     Plan/Recommendations  Diet advanced to regular and thin. Handoff to RN via [employer].    new/updated goals    23 dysphagia minutes     Tamanna Spann MA, CCC-SLP      "

## 2021-06-13 NOTE — PROGRESS NOTES
"  Clinical Nutrition Therapy Reassessment Note      Subjective:  Patient sitting up in chair.  His daughter and son are here.  Patient lives with them.  They report patient with good appetite PTA.  Patient eats beans, dairy, fruit and veggies.  He has tried some meat in the hospital and his daughter is ok with him getting meat.      Nutrition Prescription:   Diet: Diabetic, NDD1 with thin liquids.  Speech working with patient on diet texture.  Meal trays are generated on the NDD1 diet.    Current Nutrition Intake:  Decreased intake with diet texture downgrade.  Patient ate a few bites at lunch today..    Anthropometrics:  Height: 5' 8\" (172.7 cm)  Admission weight: 201#  Weight: 205 lb 11 oz (93.3 kg)      Physical Findings:  Physical findings which could indicate malnutrition: mild edema.     GI Status/Output:   GI symptoms per nursing:     Last BM recorded: 10/27    Skin/Wound:   Harry Scale Score: 16    Medications:  Medications reviewed.    Labs:  Glucose 177  FSBG 412  (10/26)  No results found for: HGBA1C   Labs reviewed    Malnutrition: Not noted    Nutrition Risk Level: low risk    Nutrition DX: Impaired nutrient utilization r/t critical illness vs new DM evidenced by elevated BG.    Goals:  BG -Not met    Education needs:  Monitor need for diabetic diet ed.    Plan:  Adjusted diet to include diabetic restrictions.  Start yogurt bid with meals. (this is entered in CBORD)  Start boost GC bid    Monitor:   Labs, po intake.    See Care Plan for Problems, Goals, and Interventions.        "

## 2021-06-13 NOTE — PROGRESS NOTES
Speech Language/Pathology  Attempted treatment 1x; patient not able to be aroused to level appropriate to benefit from skilled therapy. Consulted with RN, PT and 1:1 who all endorsed similar observations. PT reported some participation this AM with significant confusion.     Patient's son present; brief education and overview was provided.     3 volume minutes     Desi Urena MS, CCC-SLP

## 2021-06-13 NOTE — PROGRESS NOTES
Met with son and spoke with daughter-in-law Jamilah over the phone per son's request to assess for MA programs. Will assist family with application for Emergency MA (EMA) which appears to be the only possible option for coverage (if he qualifies). EMA does NOT pay for anything outside of the emergent hospitalization (unless a special care plan is approved by the state under specific criteria). Meeting with daughter-in-law Jamilah on 10/24/17 at 10:30 to complete [employer] application. SIOMARA Avilez [id number]

## 2021-06-13 NOTE — PROGRESS NOTES
Pharmacy Note - Admission Medication History  Patient picked up a 6 month supply of lisinopril 20mg daily 11/2016. Last seen in clinic 8/2017 - no further BP meds  Dispensed     ______________________________________________________________________    Prior To Admission (PTA) med list completed and updated in EMR.       PTA Med List   Medication Sig Last Dose     doxylamine (UNISON) 25 mg tablet Take 25 mg by mouth at bedtime as needed for sleep. Unknown at Unknown time       Information source(s): Patient and Family member    Summary of Changes to PTA Med List  New: Unison  Discontinued: n/a  Changed: n/a    Patient was asked about OTC/herbal products specifically.  PTA med list reflects this.    Based on the pharmacist s assessment, the PTA med list information appears unreliable: due to language barrier, inability of patient to communicate and records unavailable - through  (friend of family) patient stated he does not take a baby aspirin and does not often visit the clinic.  After discussion with daughter in-law, the patient was taking a blood pressure medication at one point in the past however they were unsure what that was.  Patient uses [facility] for healthcare needs.    Patient appears adherent: Unable to assess    Allergies were reviewed, assessed, and updated with the patient.      Patient does not use any multi-dose medications prior to admission.    Thank you for the opportunity to participate in the care of this patient.    Dasha Gifford RPh  10/23/2017 8:10 AM

## 2021-06-13 NOTE — PROGRESS NOTES
Critical Care Progress Note  10/27/2017   11:28 AM      Admit Date: 10/21/2017  ICU Day: 6  Code Status: full code      Problem List:   Principal Problem:    SAH (subarachnoid hemorrhage)  Active Problems:    Benign essential HTN    Hydrocephalus    Brain edema    Aneurysmal subarachnoid hemorrhage    Nontraumatic subarachnoid hemorrhage    Encephalopathy acute    Seizure-like activity    Hypernatremia          Plan:   1. Tight blood pressure control; parameters set by Neurosurgery with Systolic goal 110 - 160. Currently off cardene; receiving PRNs. Will add low dose Labetalol 100mg BID.   2. Ventric management per Neurosurgery. No weaning until bleed day #7 (today is day 6).   3. Continue  D5 1/2NS. Autodiuresing; watch sodium.     4. Check electrolytes now - increasing ectopy on telemetry.    5. Repeat imaging for any change in neuro exam.  6.  Nimodipine as ordered by Neurosurgery.  7. Daily TCDs  8. Continue Zosyn. Will stop Vanc.     ICU Prophylaxis   Feeding: as above  Pringle: yes - strict I/O   Analgesia/Sedation: prn  DVT prophylaxis: SCDs  HOB > 30 degrees: Yes - must be maintained at 30degrees.    GI Proph: Protonix   Glycemic Control: SSI   Family updated: yes - at bedside    Dispo: ICU due to ongoing monitoring of ICP in patient with multiple intracranial aneurysms. High risk for acute deterioration and death.     Angella Robb, [facility] Pulmonary/Critical Care    Total critical care time: 30 minutes  _______________________________________________________________    HPI: 72 year old gentleman with PMH of hypertension. He presented 10/21 after a witnessed syncopal event. He had sudden onset dizziness, light headedness, nausea and vomiting. He was taken to ER, CT scan showed acute subarachnoid hemorrhage centered within the basal cisterns; CTA revealed 4.6 x 3.4 mm anteriorly directed CONCEPCION aneurysm. Cerebral angiogram revealed multiple intracranial aneurysms. He underwent emergent left sided  crani and clipping of left MCA, left anterior choroidal, left carotid terminus, left CONCEPCION; and placement of ventriculostomy by Dr. RENO Espinal. There remains one unsecured aneurysm on the right.   Post op he was taken to ICU for further monitoring and care.     Seen with aide of professional .     ROS: unable to obtain - patient very drowsy.      Events over last 24-hours: no acute events overnight.     Objective:     Vitals:    10/27/17 1015 10/27/17 1030 10/27/17 1045 10/27/17 1100   BP: 153/70 152/72 154/72 150/66   Patient Position:       Pulse: 79 74 74 76   Resp: 18 15 14 14   Temp:    98.8  F (37.1  C)   TempSrc:    Oral   SpO2: 98% 96% 96% 98%   Weight:       Height:           I/O:     Intake/Output Summary (Last 24 hours) at 10/27/17 1128  Last data filed at 10/27/17 1100   Gross per 24 hour   Intake             3583 ml   Output             3931 ml   Net             -348 ml     Wt Readings from Last 3 Encounters:   10/27/17 205 lb 11 oz (93.3 kg)      Weight change: -10 lb 12.8 oz (-4.9 kg)    Physical Exam:  Gen:  Drowsy (just received Dilaudid).   HEENMT:PERRLA, extra ocular movement intact, sclera clear, anicteric and Crani cap in place; Ventric with clearer output. Right facial droop.   NEURO: moves all extremities, right is weaker. More difficult neuro exam presently due to narcotic.   CARDIOVASCULAR: S1, S2 normal, no murmur, rub or gallop, regular rate and rhythm  PULMONARY:Unlabored on RA; lungs are clearer today. No wheeze.    GASTROINTESTINAL: abdomen is soft - active bowel sounds.   INTEGUMENT: generalized edema    Labs:   Results from last 7 days  Lab Units 10/27/17  0411  10/21/17  1321   LN-SODIUM mmol/L 145  < > 138   LN-POTASSIUM mmol/L 3.6  < > 4.1   LN-CHLORIDE mmol/L 117*  < > 106   LN-CO2 mmol/L 22  < > 25   LN-BLOOD UREA NITROGEN mg/dL 11  < > 12   LN-CREATININE mg/dL 0.78  < > 0.79   LN-CALCIUM mg/dL 8.2*  < > 9.1   LN-PROTEIN TOTAL g/dL  --   --  7.4   LN-BILIRUBIN  TOTAL mg/dL  --   --  0.8   LN-ALKALINE PHOSPHATASE U/L  --   --  110   LN-ALT (SGPT) U/L  --   --  24   LN-AST (SGOT) U/L  --   --  25   < > = values in this interval not displayed.      Results from last 7 days  Lab Units 10/27/17  0411   LN-WHITE BLOOD CELL COUNT thou/uL 7.0   LN-HEMOGLOBIN g/dL 10.1*   LN-HEMATOCRIT % 29.5*   LN-PLATELET COUNT thou/uL 178       Micro: none this admission     Imaging: all imaging personalized reviewed   10/24 Head CT - 1.  Interval left-sided craniotomy with placement of three aneurysm clips.  2.  Fluid collection subjacent to the craniotomy and bifrontal pneumocephalus.  3.  Interval decreased subarachnoid hemorrhage and increased intraventricular hemorrhage.  4.  Left frontal approach ventriculostomy with decompressed ventricular system.  5.  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  6.  Mild mass effect with mild rightward shift of midline structures by 4 mm.    10/24 CXR - Mild pulmonary venous congestion. Trace bilateral pleural effusions. No pneumothorax. The osseous structures are intact. The cardiac mediastinal silhouette is within normal limits.      Angella Robb, [facility] Pulmonary/Critical Care

## 2021-06-13 NOTE — PROGRESS NOTES
Bedside LTM (Video EEG) monitoring was initiated. The patient has MRI/CT safe wires on. Two ecg patches and cables need to be removed prior to MRI.

## 2021-06-13 NOTE — PROGRESS NOTES
"Pharmacy Consult: Vancomycin Dosing    Pharmacist consulted to dose vancomycin for Callie Crocker, a 72 y.o. male.    Ordering provider: Lita Araya CNP    Indication for vancomycin therapy: Empiric    Goal Trough Range:  15-20 mcg/mL based on indication    Other current antimicrobials             piperacillin-tazobactam IVPB 3.375 g (ZOSYN)  Every 8 hours          vancomycin 1.5 g in sodium chloride 0.9% 500 mL (VANCOCIN)  Every 12 hours             Subjective/Objective:    Patient was admitted for SAH (subarachnoid hemorrhage) on 10/21/2017    Height: 5' 8\" (1.727 m)    Actual Body Weight (ABW): 98.2 kg (216 lb 7.9 oz)    Ideal body weight: 68.4 kg (150 lb 12.7 oz)  Adjusted ideal body weight: 80.3 kg (177 lb 1.2 oz)    BMI: Body mass index is 32.92 kg/(m^2).    No Known Allergies    Patient Active Problem List   Diagnosis     SAH (subarachnoid hemorrhage)     Benign essential HTN     Hydrocephalus     Aneurysm     Brain edema     Aneurysmal subarachnoid hemorrhage     Nontraumatic subarachnoid hemorrhage     Encephalopathy acute     Seizure-like activity    History reviewed. No pertinent past medical history.     Temp Readings from Current Encounter:     10/25/17 0800 10/25/17 1200 10/25/17 1500   Temp: (!) 101.5  F (38.6  C) (!) 101.7  F (38.7  C) (!) 100.8  F (38.2  C)     Net Intake/Output (last 24 hours):  I/O last 3 completed shifts:  In: 2573 [P.O.:270; I.V.:2103; IV Piggyback:200]  Out: 3472 [Urine:3400; Drains:72]    Recent Labs      10/23/17   0453  10/24/17   0445  10/24/17   1218  10/25/17   1159   WBC  14.9*   --   14.3*  9.9   LACTICACID   --    --   0.9   --    PROCAL   --    --   0.53*   --    BUN  21  28   --   27   CREATININE  0.88  0.93   --   0.95     Estimated Creatinine Clearance: 81 mL/min (by C-G formula based on Cr of 0.95).    No results for input(s): CULTURE in the last 72 hours.    No results found for any visits on 10/21/17.    No results for input(s): VANCOMYCIN in the " last 168 hours.    Vancomycin administrations: (last 120 hours)     None          Assessment/Plan:    Pharmacist consulted to dose vancomycin for empiric, goal trough range 15-20 mcg/mL.  1. Initiate vancomycin 1500 mg IV every 12 hours (15 mg/kg actual body weight).  2. No vancomycin level available for assessment.  3. Pharmacist will plan to check a vancomycin trough level prior to the fourth dose, or when clinically appropriate.  4. Pharmacist will continue to follow.    Thank you for the consult.  Vee Alan, PharmD 10/25/2017 4:26 PM

## 2021-06-13 NOTE — PROGRESS NOTES
Hospitalist Progress Note    Assessment/Plan  71 yo M with h/o hypertension who presented 10/21/17 after a witnessed syncopal event. He had sudden onset dizziness, light headedness, nausea and vomiting. He was taken to ER, CT scan showed acute subarachnoid hemorrhage centered within the basal cisterns; CTA revealed 4.6 x 3.4 mm anteriorly directed CONCEPCION aneurysm. Cerebral angiogram revealed multiple intracranial aneurysms. He underwent emergent left sided crani and clipping of left MCA, left anterior choroidal, left carotid terminus, left CONCEPCION; and placement of ventriculostomy by Dr. RENO Espinal. There remains one unsecured aneurysm on the right.  He has been in ICU getting close monitoring since admission without any signs of vasospasm on TCD's.       Principal Problem:    SAH (subarachnoid hemorrhage) - secondary to ruptured cerebral aneurysm s/p crani with multiple clips    Hydrocephalus - with external ventricular drain in place. Management per Neurosurgery      Active Problems:    Fevers - possibly central. Urine Cx negative. Procalcitonin negative and CXR without infiltrates to suggest pneumonia.  Completed 5 days of empiric Zosyn.    Benign essential HTN - SBP goal 110-160. Continue nimodipine,labetalol and lisinopril    Brain edema - keep sodium in normal to upper normal range    Encephalopathy acute - supportive management, follow    Seizure-like activity - on keppra x 30 days per Neurosurgery     Barriers to Discharge : ventriculostomy   Anticipated Discharge :TBD  Disposition :home      Subjective  Patient remains drowsy and confused  No fevers  Per RN, there has been some leakage from around the ventriculostomy      Objective    Vital signs in last 24 hours  Temp:  [97.8  F (36.6  C)-98.9  F (37.2  C)] 98.2  F (36.8  C)  Heart Rate:  [63-86] 66  Resp:  [14-27] 16  BP: (110-184)/() 132/70 97% O2 Device: None (Room air) O2 Flow Rate (L/min): 15 L/min  Weight:   192 lb (87.1 kg) Weight change: 4 lb 6.2 oz  (1.991 kg)    Intake/Output last 3 shifts  I/O last 3 completed shifts:  In: 650 [P.O.:600; IV Piggyback:50]  Out: 2480 [Urine:2475; Drains:5]  Intake/Output this shift:  I/O this shift:  In: 300 [P.O.:300]  Out: 800 [Urine:800]    Physical Exam:  GENERAL: Drowsy  HEENT: Head dressings are clean, intact, ventriculostomy clamped   CARDIAC: Regular rate & rhythm, S1 & S2 normal.  No gallops or murmurs. No edema  LUNGS: Clear bilaterally  ABDOMEN: Soft, non-tender, bowel sounds present all quadrants  NEURO: Oriented only to person      Pertinent Labs   Lab Results: personally reviewed.     Results from last 7 days  Lab Units 10/31/17  0502 10/30/17  0400 10/29/17  0544   LN-SODIUM mmol/L 136 138 139   LN-POTASSIUM mmol/L 3.8 4.0 3.7   LN-CHLORIDE mmol/L 110* 111* 112*   LN-CO2 mmol/L 19* 17* 18*   LN-BLOOD UREA NITROGEN mg/dL 17 17 15   LN-CREATININE mg/dL 0.75 0.80 0.77   LN-CALCIUM mg/dL 8.7 8.0* 8.4*   LN-MAGNESIUM mg/dL 2.0 2.1 1.9       Results from last 7 days  Lab Units 10/31/17  0502 10/30/17  0400 10/29/17  0544   LN-WHITE BLOOD CELL COUNT thou/uL 6.9 6.1 6.1   LN-HEMOGLOBIN g/dL 11.3* 11.3* 11.3*   LN-HEMATOCRIT % 32.6* 32.7* 33.0*   LN-PLATELET COUNT thou/uL 310 317 250           Medications  Scheduled Meds:    influenza vacc high dose (age 65 or >) (PF) 2017-18  0.5 mL Intramuscular Prior to Discharge     insulin aspart (NovoLOG) injection   Subcutaneous TID with meals     insulin aspart (NovoLOG) injection   Subcutaneous QHS     labetalol  100 mg Oral TID     levETIRAcetam  500 mg Oral BID     lisinopril  40 mg Oral DAILY     niMODipine  60 mg Oral Q4H    Or     niMODipine  60 mg Enteral Tube Q4H     pantoprazole  40 mg Oral QAM AC     polyethylene glycol  17 g Oral DAILY     senna-docusate  1 tablet Oral BID     Continuous Infusions:    niCARdipine Stopped (10/25/17 1115)     nacl 0.9% Stopped (10/29/17 2300)     PRN Meds:.acetaminophen **OR** acetaminophen, bisacodyl, dextrose 50 % (D50W), glucagon  (human recombinant), hydrALAZINE, HYDROmorphone, labetalol, magnesium hydroxide, naloxone **OR** naloxone, ondansetron **OR** ondansetron    Pertinent Radiology   Radiology Results: Personally reviewed image/s    Advanced Care Planning:  Treatment/Discharge Planning discussed with the patient and nursing staff    Total time with this patient is 25 minutes with greater than 50% of time spent in coordination of care.      Polina Bailey MD  Internal Medicine Hospitalist  10/31/2017

## 2021-06-13 NOTE — PROGRESS NOTES
"Critical Care Progress Note     Admit Date: 10/21/2017  ICU Day: 9     Code Status: full code    CC: SAH    HPI: 72 year old gentleman with PMH of hypertension. He presented 10/21/17 after a witnessed syncopal event. He had sudden onset dizziness, light headedness, nausea and vomiting. He was taken to ER, CT scan showed acute subarachnoid hemorrhage centered within the basal cisterns; CTA revealed 4.6 x 3.4 mm anteriorly directed CONCEPCION aneurysm. Cerebral angiogram revealed multiple intracranial aneurysms. He underwent emergent left sided crani and clipping of left MCA, left anterior choroidal, left carotid terminus, left CONCEPCION; and placement of ventriculostomy by Dr. RENO Espinal. There remains one unsecured aneurysm on the right.   Post op he was taken to ICU for further monitoring and care.     Major events over the last 24 hours: no new issues per sign out    Subjective: in bed, in NAD, getting TCD  ROS: unable to obtain, pt drowsy    Drips: None    Ventilator: Mechanical Ventilation Day: NA        Settings: NA    /68  Pulse 76  Temp 97.2  F (36.2  C) (Oral)   Resp 18  Ht 5' 8\" (1.727 m)  Wt 187 lb 9.8 oz (85.1 kg)  SpO2 97%  BMI 28.53 kg/m2     I/O last 3 completed shifts:  In: 1139 [P.O.:700; I.V.:334; IV Piggyback:105]  Out: 2422 [Urine:2400; Drains:22]  Weight change: -7 lb 11.5 oz (-3.5 kg)         EXAM:   Mental status: sleepy, getting TCD  HEENT: skull cap on, ventric in pace PERRL  Resp: cta non labored  Cardiovascular: RRR  Abdominal: soft nt + bs  Extremeties: no e/c/c  Neurology: WHITE  Other: ventric, kelley    Labs Personally reviewed: yes    Results from last 7 days  Lab Units 10/30/17  0400 10/29/17  0544 10/28/17  0440   LN-WHITE BLOOD CELL COUNT thou/uL 6.1 6.1 6.1   LN-HEMOGLOBIN g/dL 11.3* 11.3* 10.1*   LN-HEMATOCRIT % 32.7* 33.0* 29.4*   LN-PLATELET COUNT thou/uL 317 250 199       Results from last 7 days  Lab Units 10/30/17  0400 10/29/17  0544 10/28/17  0440   LN-SODIUM mmol/L 138 139 " 141   LN-POTASSIUM mmol/L 4.0 3.7 3.8   LN-CHLORIDE mmol/L 111* 112* 113*   LN-CO2 mmol/L 17* 18* 23   LN-BLOOD UREA NITROGEN mg/dL 17 15 10   LN-CREATININE mg/dL 0.80 0.77 0.71   LN-CALCIUM mg/dL 8.0* 8.4* 8.3*       last fever recorded on 10/28/17 100.6    Microbiology: UC NGTD  BC NGTD  BC  Imaging (all imaging is personally reviewed): yes    PCXR 10/24/17-Mild pulmonary venous congestion. Trace bilateral pleural effusions. No pneumothorax. The osseous structures are intact. The cardiac mediastinal silhouette is within normal limits.    Impression:  1. Aneurysmal subarachnoid hemorrhage   secondary to ruptured cerebral aneurysm s/p crani with multiple clips  2. Hydrocephalus    S/p EVD  3. Fevers   Now afebrile   Cultures neg to date   procal 0.15 on 10/28   cxr ok  4. Seizure like activity   5. Hypernatremia    Improving    6. Acute encephalopathy   7. Benign essential HTN    PLAN:   1. Goal SBP < 160 per neurosurgery-nimodipine,labetalol and lisinopril  2. Dc antibiotics   3. Neurosurgery may clamp ventric today  4. Serial  TCDs   5. AED x 30 days per neurology   ICU DAILY CHECKLIST                           Can patient transfer out of ICU? no    FAST HUG:    Feeding:  Feeding: po    Pringle:Yes  Analgesia/Sedation:Not Indicated NA  Thromboembolic prophylaxis: yes; Mode:  SCDs  HOB>30:  Yes  Stress Ulcer Protocol Active: yes; Mode: PPI  Glycemic Control: Any glucose > 180 no; Mode of Insulin Therapy: Sliding Scale Insulin    INTUBATED:  Can patient have daily waking:  not applicable  Can patient have spontaneous breathing trial:  not applicable    Restraints? Yes    Critical Care Medicine           10/30/207941:19 AM    Patient seen on morning rounds.   Impulsive behavior, grabbing at support tubes/lines.   Has 1:1 sitter  Bilateral soft wrist restraints in place.   Ongoing reassessment for restraint need.   Will d/c restraints when able to safely maintain support tubes in place with verbal  redirection.        PHYSICAL THERAPY AND MOBILITY:  Can patient have PT and mobility trial: yes  Activity: Bed Rest    transfer/discharge plans: stays in ICu for ventri    The patient is critically ill with impairment in organ system and high risk of life threatening deterioration.     Total CCT spent 35 minutes thus far today.       Leilani Wood [facility] [phone]  [facility]

## 2021-06-13 NOTE — PROGRESS NOTES
Hospitalist Progress Note    Assessment/Plan  73 yo M with h/o hypertension who presented 10/21/17 after a witnessed syncopal event. He had sudden onset dizziness, light headedness, nausea and vomiting. He was taken to ER, CT scan showed acute subarachnoid hemorrhage centered within the basal cisterns; CTA revealed 4.6 x 3.4 mm anteriorly directed CONCEPCION aneurysm. Cerebral angiogram revealed multiple intracranial aneurysms. He underwent emergent left sided crani and clipping of left MCA, left anterior choroidal, left carotid terminus, left CONCEPCION; and placement of ventriculostomy by Dr. RENO Espinal. There remains one unsecured aneurysm on the right.  He has been in ICU getting close monitoring since admission without any signs of vasospasm on TCD's.       Principal Problem:    SAH (subarachnoid hemorrhage) - secondary to ruptured cerebral aneurysm s/p crani with multiple clips    Hydrocephalus - with external ventricular drain in place. Management per Neurosurgery      Active Problems:    Fevers - possibly central. Urine Cx negative. Procalcitonin negative. On Zosyn empirically    Benign essential HTN - keep -160 with nimodipine,labetalol and lisinopril    Brain edema - keep sodium in normal to upper normal range    Encephalopathy acute - supportive management, follow    Seizure-like activity - on keppra x 30 days per Neurosurgery       Barriers to Discharge : ventriculostomy   Anticipated Discharge :TBD  Disposition :home      Subjective  Remains confused. No pain. Diarrhea is slowly down.      Objective    Vital signs in last 24 hours  Temp:  [98.1  F (36.7  C)-99.2  F (37.3  C)] 99.2  F (37.3  C)  Heart Rate:  [60-85] 68  Resp:  [11-29] 16  BP: (133-172)/() 155/71 97% O2 Device: Nasal cannula O2 Flow Rate (L/min): 15 L/min  Weight:   195 lb 5.2 oz (88.6 kg) Weight change: -22 lb 7.8 oz (-10.2 kg)    Intake/Output last 3 shifts  I/O last 3 completed shifts:  In: 929 [P.O.:490; I.V.:334; IV  Piggyback:105]  Out: 2970 [Urine:2925; Drains:45]  Intake/Output this shift:       Physical Exam:  GENERAL: More awake today  HEENT: Head dressings are clean, intact, ventriculostomy noted   CARDIAC: Regular rate & rhythm, S1 & S2 normal.  No gallops or murmurs. No edema  LUNGS: Clear bilaterally  ABDOMEN: Soft, non-tender, bowel sounds present all quadrants  NEURO: Confused       Pertinent Labs   Lab Results: personally reviewed.     Results from last 7 days  Lab Units 10/29/17  0544 10/28/17  0440 10/27/17  1116 10/27/17  0411   LN-SODIUM mmol/L 139 141 144 145   LN-POTASSIUM mmol/L 3.7 3.8 4.0 3.6   LN-CHLORIDE mmol/L 112* 113*  --  117*   LN-CO2 mmol/L 18* 23  --  22   LN-BLOOD UREA NITROGEN mg/dL 15 10  --  11   LN-CREATININE mg/dL 0.77 0.71  --  0.78   LN-CALCIUM mg/dL 8.4* 8.3*  --  8.2*   LN-MAGNESIUM mg/dL 1.9 1.9 1.9 2.0       Results from last 7 days  Lab Units 10/29/17  0544 10/28/17  0440 10/27/17  0411   LN-WHITE BLOOD CELL COUNT thou/uL 6.1 6.1 7.0   LN-HEMOGLOBIN g/dL 11.3* 10.1* 10.1*   LN-HEMATOCRIT % 33.0* 29.4* 29.5*   LN-PLATELET COUNT thou/uL 250 199 178           Medications  Scheduled Meds:    influenza vacc high dose (age 65 or >) (PF) 2017-18  0.5 mL Intramuscular Prior to Discharge     insulin aspart (NovoLOG) injection   Subcutaneous TID with meals     insulin aspart (NovoLOG) injection   Subcutaneous QHS     labetalol  100 mg Oral TID     levETIRAcetam  500 mg Oral BID     lisinopril  40 mg Oral DAILY     niMODipine  60 mg Oral Q4H    Or     niMODipine  60 mg Enteral Tube Q4H     pantoprazole  40 mg Oral QAM AC     piperacillin-tazobactam (ZOSYN) IV  3.375 g Intravenous Q8H     polyethylene glycol  17 g Oral DAILY     senna-docusate  1 tablet Oral BID     Continuous Infusions:    niCARdipine Stopped (10/25/17 1115)     nacl 0.9% 25 mL/hr (10/28/17 2187)     PRN Meds:.acetaminophen **OR** acetaminophen, bisacodyl, dextrose 50 % (D50W), glucagon (human recombinant), hydrALAZINE,  HYDROmorphone, labetalol, magnesium hydroxide, naloxone **OR** naloxone, ondansetron **OR** ondansetron    Pertinent Radiology   Radiology Results: Personally reviewed image/s    Advanced Care Planning:  Treatment/Discharge Planning discussed with the patient, family, nursing staff    Total time with this patient is 25 minutes with greater than 50% of time spent in coordination of care.      Polina Bailey MD  Internal Medicine Hospitalist  10/29/2017

## 2021-06-13 NOTE — PROGRESS NOTES
"  Clinical Nutrition Therapy Assessment Note      Reason for Assessment:   Callie Crocker is a 72 y.o. male assessed by the RD for length of stay screen and protocol/pathway order.    Subjective:  Patient sleeping.  Family members visiting.  They report patient eating well PTA.  Patient had emesis this am.   Chart reviewed.    Nutrition Prescription:   Diet: General, though held today d/t emesis.    Current Nutrition Intake:  Lunch yesterday recorded:  75% eaten.  No other meals found.    Anthropometrics:  Height: 5' 8\" (172.7 cm)  Admission weight: 201#  Weight: 208 lb 12.4 oz (94.7 kg)  BMI (Calculated): 30.6  BMI indication: 30-34.9 obesity (class 1)  Ideal body weight 154#+-10%  % Ideal body weight 135%  Usual body weight n/a    Physical Findings:  Physical findings which could indicate malnutrition: mild edema.     GI Status/Output:   GI symptoms per nursing:     Bowel Sounds present  Last BM recorded: 10/24    Skin/Wound:   Harry Scale Score: 14    Medications:  Medications reviewed.    Labs:  Glucose 192  FSBG 205  No results found for: HGBA1C   Labs reviewed    Estimated Nutrition Needs:  Using ideal weight weight of 70 kg.    Energy Needs: 0590-5279 kcals daily per 24-30 kcal/kg   Protein Needs: 84-98 g daily, 1.2-1.4 g/kg.  Fluid Needs: 3187-3830 mls daily, 25-30 mls/kg    Malnutrition: Not noted    Nutrition Risk Level: low risk    Nutrition DX: Impaired nutrient utilization r/t critical illness vs new DM evidenced by elevated BG.    Goals:  BG     Education needs:  Monitor need for diabetic diet ed.    Plan:  Recommend diabetic diet    Monitor:   Labs, po intake.    See Care Plan for Problems, Goals, and Interventions.        "

## 2021-06-13 NOTE — PROGRESS NOTES
"  Clinical Nutrition Therapy Follow Up Note      Nutrition Prescription:   Diet: Diabetic, NDD1 with thin liquids; SLP just upgraded textures to regular food and fluids; Greek yogurt lunch and supper  Supplements:  Boost GC changed to chocolate flavor per pt request BID    Current Nutrition Intake:  Decreased intake with diet texture downgrade.  Patient looking forward to regular textures    Anthropometrics:  Height: 5' 8\" (172.7 cm)  Admission weight: 201#  Weight: 187 lb 9.8 oz (85.1 kg)    Physical Findings:  Physical findings which could indicate malnutrition: mild edema.    GI Status/Output:   GI symptoms per nursing: loose stools    Skin/Wound:   Harry Scale Score: 16    Labs:  Glucose 179   Labs reviewed    Malnutrition: Not noted    Nutrition Risk Level: low risk    Nutrition DX: Impaired nutrient utilization r/t critical illness vs new DM evidenced by elevated BG.    Goals:  BG - met    Education needs:  Monitor need for diabetic diet ed.    Plan:  Adjusted diet to diabetic.    Monitor:   Labs, po intake, need for diet ed.    See Care Plan for Problems, Goals, and Interventions.        "

## 2021-06-13 NOTE — PROGRESS NOTES
Hospitalist Progress Note    Assessment/Plan  73 yo M with h/o hypertension who presented 10/21/17 after a witnessed syncopal event. He had sudden onset dizziness, light headedness, nausea and vomiting. He was taken to ER, CT scan showed acute subarachnoid hemorrhage centered within the basal cisterns; CTA revealed 4.6 x 3.4 mm anteriorly directed CONCEPCION aneurysm. Cerebral angiogram revealed multiple intracranial aneurysms. He underwent emergent left sided crani and clipping of left MCA, left anterior choroidal, left carotid terminus, left CONCEPCION; and placement of ventriculostomy by Dr. RENO Espinal. There remains one unsecured aneurysm on the right.  He has been in ICU getting close monitoring since admission without any signs of vasospasm on TCD's.       Principal Problem:    SAH (subarachnoid hemorrhage) - secondary to ruptured cerebral aneurysm s/p crani with multiple clips    Hydrocephalus - with external ventricular drain in place     Active Problems:    Fevers - ? Central vs bronchitis or pneumonia given cough. Will check procalcitonin.  Urine Cx is negative.  On Zosyn empirically, follow closely    Benign essential HTN - keep -160 with nimodipine and lisinopril    Brain edema - keep sodium in normal to upper normal range    Encephalopathy acute - supportive management, follow    Seizure-like activity - on keppra    Hypernatremia - switched to 1/2 NS per neurosurgery - need to follow this closely so he doesn't drop lower than normal.    Barriers to Discharge : ventriculostomy   Anticipated Discharge :TBD  Disposition :home      Subjective  Patient remains confused. Denies having any pain. Has been coughing.       Objective    Vital signs in last 24 hours  Temp:  [98.6  F (37  C)-99.9  F (37.7  C)] 98.8  F (37.1  C)  Heart Rate:  [71-97] 73  Resp:  [14-31] 16  BP: (115-177)/() 161/77 100% O2 Device: None (Room air) O2 Flow Rate (L/min): 15 L/min  Weight:   205 lb 11 oz (93.3 kg) Weight change: -10 lb 12.8  oz (-4.9 kg)    Intake/Output last 3 shifts  I/O last 3 completed shifts:  In: 3143 [P.O.:560; I.V.:1160; IV Piggyback:1423]  Out: 3939 [Urine:3800; Drains:139]  Intake/Output this shift:       Physical Exam:  GENERAL: No acute distress  HEENT: Head dressings are clean, intact, ventriculostomy noted   CARDIAC: Regular rate & rhythm, S1 & S2 normal.  No gallops or murmurs. No edema  LUNGS: Course breath sounds bilaterally  ABDOMEN: Soft, non-tender, bowel sounds present all quadrants  NEURO: Confused       Pertinent Labs   Lab Results: personally reviewed.     Results from last 7 days  Lab Units 10/27/17  1116 10/27/17  0411 10/27/17  0040  10/26/17  0427 10/25/17  1159  10/21/17  1321   LN-SODIUM mmol/L 144 145 137  < > 150* 148*  < > 138   LN-POTASSIUM mmol/L 4.0 3.6  --   --  4.1 4.0  < > 4.1   LN-CHLORIDE mmol/L  --  117*  --   --  127* 125*  < > 106   LN-CO2 mmol/L  --  22  --   --  19* 18*  < > 25   LN-BLOOD UREA NITROGEN mg/dL  --  11  --   --  21 27  < > 12   LN-CREATININE mg/dL  --  0.78  --   --  0.84 0.95  < > 0.79   LN-CALCIUM mg/dL  --  8.2*  --   --  8.2* 8.2*  < > 9.1   LN-ALBUMIN g/dL  --   --   --   --   --   --   --  3.7   LN-PROTEIN TOTAL g/dL  --   --   --   --   --   --   --  7.4   LN-BILIRUBIN TOTAL mg/dL  --   --   --   --   --   --   --  0.8   LN-ALKALINE PHOSPHATASE U/L  --   --   --   --   --   --   --  110   LN-ALT (SGPT) U/L  --   --   --   --   --   --   --  24   LN-AST (SGOT) U/L  --   --   --   --   --   --   --  25   LN-MAGNESIUM mg/dL 1.9 2.0  --   --  2.2  --   < >  --    < > = values in this interval not displayed.    Results from last 7 days  Lab Units 10/27/17  0411 10/26/17  0427 10/25/17  1159  10/21/17  1321   LN-WHITE BLOOD CELL COUNT thou/uL 7.0 7.6 9.9  < > 3.9*   LN-HEMOGLOBIN g/dL 10.1* 9.8* 10.2*  < > 15.2   LN-HEMATOCRIT % 29.5* 29.3* 29.6*  < > 42.4   LN-PLATELET COUNT thou/uL 178 178 173  < > 195   LN-NEUTROPHILS RELATIVE PERCENT %  --   --   --   --  57   LN-MONOCYTES  RELATIVE PERCENT %  --   --   --   --  7   < > = values in this interval not displayed.        Medications  Scheduled Meds:    influenza vacc high dose (age 65 or >) (PF) 2017-18  0.5 mL Intramuscular Prior to Discharge     insulin aspart (NovoLOG) injection   Subcutaneous TID with meals     insulin aspart (NovoLOG) injection   Subcutaneous QHS     labetalol  100 mg Oral BID     levETIRAcetam  500 mg Oral BID     lisinopril  20 mg Oral DAILY     niMODipine  60 mg Oral Q4H    Or     niMODipine  60 mg Enteral Tube Q4H     [START ON 10/28/2017] pantoprazole  40 mg Oral QAM AC     piperacillin-tazobactam (ZOSYN) IV  3.375 g Intravenous Q8H     polyethylene glycol  17 g Oral DAILY     senna-docusate  1 tablet Oral BID     Continuous Infusions:    dextrose 5%-NaCl 0.45% 50 mL/hr (10/27/17 0330)     niCARdipine Stopped (10/25/17 1115)     PRN Meds:.acetaminophen **OR** acetaminophen, bisacodyl, dextrose 50 % (D50W), glucagon (human recombinant), hydrALAZINE, HYDROmorphone, labetalol, magnesium hydroxide, naloxone **OR** naloxone, ondansetron **OR** ondansetron    Pertinent Radiology   Radiology Results: Personally reviewed image/s    Advanced Care Planning:  Treatment/Discharge Planning discussed with the patient, family, neurosurgery     Total time with this patient is 25 minutes with greater than 50% of time spent in coordination of care.      Polina Bailey MD  Internal Medicine Hospitalist  10/27/2017

## 2021-06-13 NOTE — PROGRESS NOTES
"[facility] Internal Medicine Progress Note       ASSESSMENT:    Principal Problem:    SAH (subarachnoid hemorrhage)  Active Problems:    Benign essential HTN    Hydrocephalus      PLAN:   SAH: s/p craniotomy and clipping of left middle cerebral aneurysm, anterior choroidal aneurysm, left carotid terminus aneurysm, left anterior communicating artery aneurysm, and placement of left frontal ventriculostomy drain.  -- doing well post op in pacu  -- will provide blood pressure control as needed  -- continue keppra  -- follow for euglycemia, add SSI if needed, ADAT per surgery   -- neurosurgery to follow    DVT PPX: SCD    Needs for Discharge: slow clinical improvement, pain management, bowel function, diet progress, testing/consultation and mobility progress     ESTIMATED DISCHARGE:  2D  TCU        Valentino Issa D.O.  [phone]            -------------------------------------------------------------------------------------------------------------  SUBJECTIVE: NAD. Currently still sedated in PACU  Exam:  /60  Pulse 85  Temp 98.8  F (37.1  C)  Resp 10  Ht 5' 8\" (1.727 m)  Wt 201 lb 4.5 oz (91.3 kg)  SpO2 95%  BMI 30.6 kg/m2  General: NAD  RESPIRATORY: Clear to auscultation   CARDIOVASCULAR: S1, S2, without murmur. No le edema bilat.   ABDOMEN: soft and non-tender  MUSCULOSKELETAL: Head dressing in place  NEUROLOGIC: Unable to perform full exam but will open eyes spontaneously     Diagnostics Reviewed:      Recent Results (from the past 24 hour(s))   APTT(PTT)    Collection Time: 10/21/17  8:31 PM   Result Value Ref Range    PTT 24 24 - 37 seconds   Platelet Function Test    Collection Time: 10/21/17  8:31 PM   Result Value Ref Range    PFA-COL/ 1 - 180 sec   Type and Screen    Collection Time: 10/21/17  8:31 PM   Result Value Ref Range    ABORh B POS     Antibody Screen Negative Negative   Potassium    Collection Time: 10/21/17  8:31 PM   Result Value Ref Range    Potassium 4.2 3.5 - 5.0 mmol/L "   Magnesium    Collection Time: 10/21/17  8:31 PM   Result Value Ref Range    Magnesium 1.8 1.8 - 2.6 mg/dL

## 2021-06-13 NOTE — PROGRESS NOTES
NEUROSURGERY POST-OP NOTE:    ASSESSMENT:      Callie Crocker is POD # 3, Bleed Day #4 status post  LEFT SIDED CRANIOTOMY AND CLIPPING OF MULTIPLE INTRACRANIAL ANEURYSMS (left MCA, Left anterior choroidal, left carotid terminus, left CONCEPCION),  AND PLACEMENT OF VENTRICULOSTOMY  by Dr. Lisette Espinal. Complete occlusion of left sided clipped aneurysms. Left MCA aneurysm with clot present, likely to be cause of SAH. Patient still has one unsecured aneurysm on the right.      Overnight, he was again agitated. Son reports no active ETOH use for many years.  He was given a low dose of Seroquel, which provided marginal improvement in agitation.   Discussion w Intensivist regarding best approach to keep patient and staff safe and ability to track exam.  He has unsecured aneurysm, cannot have him severely agitated    BP appears better controlled and maintained w/n goal   TCD pending     ICPs < 10  Although he appears to be in fluid deficit today--overall total since admission shows up 5 L     Updated son w assistance of   + fever 101.7-- it's too early to consider ventriculitis or meningitis, ck UA/UC, BC, discussed w Intensivist NP--no repeat chest xray at this time   At the time of syncopal event symptoms suggestive of seizures--he was started on Keppra  Mild blood loss anemia hgb 10.2      PLAN:       Patient Active Problem List   Diagnosis     SAH (subarachnoid hemorrhage)     Benign essential HTN  SBP Goal <160 >110.         Hydrocephalus  -Maintain Ventric at 10 cm,  Continue at least 7 days   -Hourly neuro checks  -Elevate HOB at least or higher 30 degrees  -100% O2 via mask for 24 hours for pneumocephalus on  CT  End at 1500 today   -Precedex managed by  Intensivist   No sedating meds unless discussed w Neurosurgery   Track TCD and exam closely for sx/s of vasospasms     Aneurysm     Brain edema  Encephalopathy-Precedex w low RASS of 0-1       Aneurysmal subarachnoid hemorrhage  -Continue TCDs  day 3-7, monitor clinically for s/sx of vasospasm such as headache/stroke symptoms  -Goal normal sodium, not currently on sodium supplementation. Daily Sodiums  -Would like to see patient euvolemia or positive volume status. Strict I/Os. He's over % L up in volume since admission   Clamp ventric for short periods and progress activity   Continue kelley to monitor I&O     Fever: Start IV Zosyn and Vancomycin     Seizure like activity: Keppra 500mg BID x 30 days     Blood loss anemia-Track and follow for symptoms--considerations for transfusion if < 8.0                        Discharge Recommendations/Plan:  Barriers to Discharge: yes, aneurysmal SAH with ventriculostomy placement.     Follow Up Plan: Right unsecured aneurysm will need to be addressed after this event, possible follow up with IR vs surveillance monitoring. Plan TBD per Dr. Lisette Espinal            HPI: Callie Crocker is POD # 2, bleed day #3 status post LEFT SIDED CRANIOTOMY AND CLIPPING OF MULTIPLE INTRACRANIAL ANEURYSMS, INTRA-OP ANGIOS AND PLACEMENT OF VENTRICULOSTOMY  by Dr. Lisette Espinal. He is 72 year old male presented after sudden onset of lightheadedness, dizziness, nausea and vomiting and syncopal event witnessed by his son. CT in the ED revealed subarachnoid hemorrhage, CTA 4.6 x 3.4 mm anteriorly directed CONCEPCION aneurysm. 4V revealed multiple aneurysms as detailed below. He was taken to the OR for clipping with Dr. Espinal. All four of the left sided aneurysms were clipped and completely occluded, the right sided aneurysm remains unsecured.     10/23- Became agitated overnight (into 10/24), concern for alcohol withdrawal, necessitated PRNs for agitation. Repeat head CT with pneumocephalus left frontal, interval decrease in size of SAH, ventricular system mildly decreased hydrocephalus, midline shift. Subacute infarct noted, not unexpected due to approach in surgery.  10/24- Coffee ground emesis, started on protonix. Repeat head CT  reveals interval decrease in SAH, increased IVH, infarcts consistent with surgical intervention        SUBJECTIVE:  Denies headache  Son reports no ETOH use  in greater than 4 years       OBJECTIVE:  Vital signs in last 24 hours  Temp:  [98.2  F (36.8  C)-101.8  F (38.8  C)] 101.8  F (38.8  C)  Heart Rate:  [] 97  Resp:  [14-32] 16  BP: (100-172)/(51-72) 148/65  Arterial Line BP: (108-154)/() 123/59  Body mass index is 32.92 kg/(m^2).    Mental Status: lethargic, person and place speech raspy and soft   Cranial Nerves: II to XII  unremarkable   Follows commands in all extremities.  No focal weakness   Incision: Incision was approximated with staples and covered with Other: head dressing  The incision is clean and dry   Drain: Ventric w patency verified, good wave form --bloody output  106 cc output last 24 hours       Pertinent Labs   Lab Results:   Results for MAK ORTIZ (MRN [id number]) as of 10/25/2017 15:42   Ref. Range 10/21/2017 13:21 10/23/2017 04:53 10/24/2017 12:18 10/25/2017 11:59   WBC Latest Ref Range: 4.0 - 11.0 thou/uL 3.9 (L) 14.9 (H) 14.3 (H) 9.9   Results for MAK ORTIZ (MRN [id number]) as of 10/25/2017 15:42   Ref. Range 10/23/2017 04:53 10/24/2017 04:45 10/24/2017 04:45 10/25/2017 03:57 10/25/2017 11:59   Sodium Latest Ref Range: 136 - 145 mmol/L 137 142 141 147 (H) 148 (H)     Results for MAK ORTIZ (MRN [id number]) as of 10/25/2017 15:42   Ref. Range 10/21/2017 13:21 10/23/2017 04:53 10/24/2017 12:18 10/25/2017 11:59   Hemoglobin Latest Ref Range: 14.0 - 18.0 g/dL 15.2 11.6 (L) 10.7 (L) 10.2 (L)     Pertinent Radiology Radiology Results: .     personally reviewed.

## 2021-06-13 NOTE — PROGRESS NOTES
NEUROSURGERY POST-OP NOTE:    ASSESSMENT:     Callie Crocker is POD # 9, bleed day #10  status post  LEFT SIDED CRANIOTOMY AND CLIPPING OF MULTIPLE INTRACRANIAL ANEURYSMS, INTRA-OP ANGIOS AND PLACEMENT OF VENTRICULOSTOMY  by Dr. Lisette Espinal.   Complete occlusion of left sided clipped aneurysms. Left MCA aneurysm with clot present, likely to be cause of SAH. Patient still has one unsecured aneurysm on the right.    No leaking on dressing noted. ICP 3-9 with EVD clamped x 48 hours now. Has episodes of extreme sleepiness and then wakes up and responsive. Will check for seizure activity. CT head stable compared to yesterday. He is confused but occasionally answers appropriately via .       PLAN:     Patient Active Problem List   Diagnosis     SAH (subarachnoid hemorrhage)     Benign essential HTN     1.   - 160.       Obstructive hydrocephalus     1.  CT is stable. ICP is stable will discontinue EVD.      2.  Continue every 2 hour neuro checks overnight.      3.  HOB elevated.      Aneurysm     Brain edema     Aneurysmal subarachnoid hemorrhage     Nontraumatic subarachnoid hemorrhage     1.  TCD's complete.     2.   Monitor post operative left fluid collection, which is better today.      3.   Crani cap and clean gauze to incision.     4.  Patient needs normal glucose for wound healing.      Encephalopathy acute     Seizure-like activity     1.  EEG to see if patient's sleepiness is seizure activity.      Hypernatremia     Brain compression      1.  Goal normal sodium at this time.           Discharge Recommendations/Plan:  Barriers to Discharge: yes  Continued acute medical management.     Follow Up Plan: right unsecured aneurysm will need to be addressed after this event. Follow up with IR versus surveillance monitoring.     Plan TBD by Dr. Espinal.          HPI: Callie Crocker is status post LEFT SIDED CRANIOTOMY AND CLIPPING OF MULTIPLE INTRACRANIAL ANEURYSMS, INTRA-OP ANGIOS AND  PLACEMENT OF VENTRICULOSTOMY  by Dr. Lisette Espinal. He is 72 year old male presented after sudden onset of lightheadedness, dizziness, nausea and vomiting and syncopal event witnessed by his son. CT in the ED revealed subarachnoid hemorrhage, CTA 4.6 x 3.4 mm anteriorly directed CONCEPCION aneurysm. 4V revealed multiple aneurysms as detailed below. He was taken to the OR for clipping with Dr. Espinal. All four of the left sided aneurysms were clipped and completely occluded, the right sided aneurysm remains unsecured.    SUBJECTIVE:  Lethargic. Briefly wakes up and follows commands.       OBJECTIVE:  Vital signs in last 24 hours  Temp:  [97.9  F (36.6  C)-99  F (37.2  C)] 98.2  F (36.8  C)  Heart Rate:  [63-77] 71  Resp:  [11-31] 16  BP: (107-156)/(54-75) 109/58  Body mass index is 28.4 kg/(m^2).    Mental Status: lethargic, speech aphasic   PERRL, weakly follows commands. Speech is not making sense.       Incision: Incision was approximated with staples and covered with dry gauze.  The incision is clean and dry.    Drain: EVD is clamped in place with no drainage overnight.  ICP 3-9.     Bowel Management:  Prophylaxis yes   Last BM:  today    Pertinent Labs   Lab Results:   Lab Results   Component Value Date     11/01/2017    K 4.1 11/01/2017     (H) 11/01/2017    CO2 18 (L) 11/01/2017    BUN 18 11/01/2017    CREATININE 0.80 11/01/2017    CALCIUM 9.1 11/01/2017     Lab Results   Component Value Date    WBC 7.4 11/01/2017    HGB 11.6 (L) 11/01/2017    HCT 34.1 (L) 11/01/2017    MCV 91 11/01/2017     11/01/2017       Pertinent Radiology   Radiology Results: CT head:    Redemonstration of left craniotomy and aneurysm clipping. Mild ischemic change left frontal lobe and basal ganglia, stable. Stable elliptical collection deep to the craniectomy site. Stable left to right midline shift. Stable left frontal shunt   tube. Stable ventricular size stable small amount of intraventricular hemorrhage. Stable  small amount of bilateral subarachnoid hemorrhage. Normal orbits. Clear paranasal sinuses and mastoid air cells.      ADDITIONAL COMMENTS:The patient's clinical examination, laboratory data, and plan was discussed with Dr. Lisette Espinal.      Khalida Swanson, [facility] Neurosurgery  [phone]

## 2021-06-13 NOTE — PROGRESS NOTES
Pulmonary/Critical Care  10/26/2017  8:43 AM     Patient seen on morning interdisciplinary rounds.   Need for ongoing bilateral soft wrist restraints assessed and confirmed.     Angella Robb, [facility] Pulmonary/Critical Care

## 2021-06-13 NOTE — PROGRESS NOTES
NEUROSURGERY POST-OP NOTE:    ASSESSMENT:      Callie Crocker is POD # 8,  Bleed Day #9 status post  LEFT SIDED CRANIOTOMY AND CLIPPING OF MULTIPLE INTRACRANIAL ANEURYSMS (left MCA, Left anterior choroidal, left carotid terminus, left CONCEPCION),  AND PLACEMENT OF VENTRICULOSTOMY  by Dr. Lisette Espinal. Complete occlusion of left sided clipped aneurysms. Left MCA aneurysm with clot present, likely to be cause of SAH. Patient still has one unsecured aneurysm on the right.     Confusion persists, likely secondary to significant burden of SAH. CT this morning with more prominent ventricles, particularly in the occipital horn of L lateral ventricle, RN reported episode of leakage on pillow but none observed on dressing or pillow or insertion site when patient examined. Discussed with Dr. Lisette Espinal- no antibiotics for now, continue to monitor for additional episode of leaking. His mental status is improved today as compared to yesterday, ICP stable, will plan to keep drain clamped and repeat CT in the am.     Complicated social situation- discussed with care mgmt who is working at trying to qualify patient for additional emergency assistance as he would be best served in a rehab setting post hospital stay.   PLAN:           Patient Active Problem List   Diagnosis     SAH (subarachnoid hemorrhage)     Benign essential HTN  SBP Goal <160 >110        Hydrocephalus  -Q 2 hour neuro checks  -Elevate HOB at least or higher 30 degrees  -1:1 --no sedation unless safety issue , discuss w neurosurgery before giving benzos    TCDs complete       Aneurysm     Brain edema     Aneurysmal subarachnoid hemorrhage  Goal euvolemia  Continue to clamp ventric. Monitor ICP hourly. Call for elevation in ICP or change in exam  DC kelley, bladder scan  Goal normal sodium                  Discharge Recommendations/Plan:  Barriers to Discharge: yes, aneurysmal SAH with ventriculostomy placement, currently weaning ventic     Follow Up Plan:  Right unsecured aneurysm will need to be addressed after this event, possible follow up with IR vs surveillance monitoring. Plan TBD per Dr. Lisette Espinal               HPI: Mak Ortiz is status post LEFT SIDED CRANIOTOMY AND CLIPPING OF MULTIPLE INTRACRANIAL ANEURYSMS, INTRA-OP ANGIOS AND PLACEMENT OF VENTRICULOSTOMY  by Dr. Lisette Espinal. He is 72 year old male presented after sudden onset of lightheadedness, dizziness, nausea and vomiting and syncopal event witnessed by his son. CT in the ED revealed subarachnoid hemorrhage, CTA 4.6 x 3.4 mm anteriorly directed CONCEPCION aneurysm. 4V revealed multiple aneurysms as detailed below. He was taken to the OR for clipping with Dr. Espinal. All four of the left sided aneurysms were clipped and completely occluded, the right sided aneurysm remains unsecured.        SUBJECTIVE:  Up to chair, alert and interactive, still confused. Pulling at kelley per report of 1:1 sitter.        OBJECTIVE:  Vital signs in last 24 hours  Temp:  [97.8  F (36.6  C)-98.9  F (37.2  C)] 98.2  F (36.8  C)  Heart Rate:  [63-86] 71  Resp:  [14-27] 17  BP: (125-184)/() 127/61  Body mass index is 29.19 kg/(m^2).    Mental Status: alert, oriented to self.  Cranial Nerves: PERRL, EOMI, R facial weakness, tongue midline, shoulder shrug equal   Motor Strength: Moving all extremities with equal strength and coordination  Incision: Incision was approximated with staples and covered with fresh telfa and wrap.     ICP: 7    Ventric: patent, pulsatile, pink tinged. Minimal output over the last 24 hours    Last 3 TCD Values       10/28/2017 10/29/2017 10/30/2017   LMCA    54 83 70   LACA    68 59 71   RMCA    74 72 74   RACA    67 61 68   BA    36 53 59          Pertinent Labs       Results for MAK ORTIZ (MRN [id number]) as of 10/31/2017 14:32   10/31/2017 05:02   WBC 6.9   RBC 3.67 (L)   Hemoglobin 11.3 (L)   Hematocrit 32.6 (L)   MCV 89   MCH 30.8   MCHC 34.7   RDW 14.6 (H)    Platelets 310   MPV 10.3     Radiology:    None new      Lizabeth Carlson [facility] Neurosurgery  [address], [address]  [place] [address]    O: [phone]  P: [phone]

## 2021-06-13 NOTE — ANESTHESIA POSTPROCEDURE EVALUATION
Patient: Callie Crocker  LEFT SIDED CRANIOTOMY AND CLIPPING OF MULTIPLE INTRACRANIAL ANEURYSMS, INTRA-OP ANGIOS AND PLACEMENT OF VENTRICULOSTOMY  Anesthesia type: general    Patient location: PACU  Last vitals:   Vitals:    10/22/17 1445   BP:    Pulse: 76   Resp: 10   Temp: 36.6  C (97.9  F)   SpO2: 97%     Post vital signs: stable  Level of consciousness: awake and responds to simple questions  Post-anesthesia pain: pain controlled  Post-anesthesia nausea and vomiting: no  Pulmonary: unassisted, return to baseline  Cardiovascular: stable and blood pressure at baseline  Hydration: adequate  Anesthetic events: no    QCDR Measures:  ASA# 11 - Elena-op Cardiac Arrest: ASA11B - Patient did NOT experience unanticipated cardiac arrest  ASA# 12 - Elena-op Mortality Rate: ASA12B - Patient did NOT die  ASA# 13 - PACU Re-Intubation Rate: ASA13B - Patient did NOT require a new airway mgmt  ASA# 10 - Composite Anes Safety: ASA10A - No serious adverse event    Additional Notes:  htn treated

## 2021-06-13 NOTE — PROGRESS NOTES
11ST PT treatment attempt: Pt now a PUI for Covid-19. Per recent PT notes, pt not near baseline or close to DC home.  Therefore, will hold PT treatment until test results are clarified.   Hospitalist Progress Note    Assessment/Plan  71 yo M with h/o hypertension who presented 10/21/17 after a witnessed syncopal event. He had sudden onset dizziness, light headedness, nausea and vomiting. He was taken to ER, CT scan showed acute subarachnoid hemorrhage centered within the basal cisterns; CTA revealed 4.6 x 3.4 mm anteriorly directed CONCEPCION aneurysm. Cerebral angiogram revealed multiple intracranial aneurysms. He underwent emergent left sided crani and clipping of left MCA, left anterior choroidal, left carotid terminus, left CONCEPCION; and placement of ventriculostomy by Dr. RENO Espinal. There remains one unsecured aneurysm on the right.  He has been in ICU getting close monitoring since admission without any signs of vasospasm on TCD's.       Principal Problem:    SAH (subarachnoid hemorrhage) - secondary to ruptured cerebral aneurysm s/p crani with multiple clips    Hydrocephalus - with external ventricular drain in place. Management per Neurosurgery      Active Problems:    Fevers - possibly central. Urine Cx negative. Procalcitonin negative and CXR without infiltrates to suggest pneumonia.  Completed 5 days of empiric Zosyn.    Benign essential HTN - SBP goal 110-160. Continue nimodipine,labetalol and lisinopril    Brain edema - keep sodium in normal to upper normal range    Encephalopathy acute - supportive management, follow    Seizure-like activity - on keppra x 30 days per Neurosurgery     Barriers to Discharge : ventriculostomy   Anticipated Discharge :TBD  Disposition :home      Subjective  History obtained with the help of professional Irish interpretor.  Patient remains confused,talks about Jason and angels caring for him.   No fevers. No pain. Still has some cough. Gets ~ 2000 ml on IS. Had diarrhea this morning.       Objective    Vital signs in last 24 hours  Temp:  [97.2  F (36.2  C)-99.4  F (37.4  C)] 98  F (36.7  C)  Heart Rate:  [65-96] 86  Resp:  [13-33] 21  BP: (101-177)/(55-96) 111/57  97% O2 Device: None (Room air) O2 Flow Rate (L/min): 15 L/min  Weight:   187 lb 9.8 oz (85.1 kg) Weight change: -7 lb 11.5 oz (-3.5 kg)    Intake/Output last 3 shifts  I/O last 3 completed shifts:  In: 1139 [P.O.:700; I.V.:334; IV Piggyback:105]  Out: 2422 [Urine:2400; Drains:22]  Intake/Output this shift:  I/O this shift:  In: 150 [P.O.:100; IV Piggyback:50]  Out: 555 [Urine:550; Drains:5]    Physical Exam:  GENERAL: More awake today  HEENT: Head dressings are clean, intact, ventriculostomy clamped   CARDIAC: Regular rate & rhythm, S1 & S2 normal.  No gallops or murmurs. No edema  LUNGS: Clear bilaterally  ABDOMEN: Soft, non-tender, bowel sounds present all quadrants  NEURO: Oriented only to person      Pertinent Labs   Lab Results: personally reviewed.     Results from last 7 days  Lab Units 10/30/17  0400 10/29/17  0544 10/28/17  0440   LN-SODIUM mmol/L 138 139 141   LN-POTASSIUM mmol/L 4.0 3.7 3.8   LN-CHLORIDE mmol/L 111* 112* 113*   LN-CO2 mmol/L 17* 18* 23   LN-BLOOD UREA NITROGEN mg/dL 17 15 10   LN-CREATININE mg/dL 0.80 0.77 0.71   LN-CALCIUM mg/dL 8.0* 8.4* 8.3*   LN-MAGNESIUM mg/dL 2.1 1.9 1.9       Results from last 7 days  Lab Units 10/30/17  0400 10/29/17  0544 10/28/17  0440   LN-WHITE BLOOD CELL COUNT thou/uL 6.1 6.1 6.1   LN-HEMOGLOBIN g/dL 11.3* 11.3* 10.1*   LN-HEMATOCRIT % 32.7* 33.0* 29.4*   LN-PLATELET COUNT thou/uL 317 250 199           Medications  Scheduled Meds:    influenza vacc high dose (age 65 or >) (PF) 2017-18  0.5 mL Intramuscular Prior to Discharge     insulin aspart (NovoLOG) injection   Subcutaneous TID with meals     insulin aspart (NovoLOG) injection   Subcutaneous QHS     labetalol  100 mg Oral TID     levETIRAcetam  500 mg Oral BID     lisinopril  40 mg Oral DAILY     niMODipine  60 mg Oral Q4H    Or     niMODipine  60 mg Enteral Tube Q4H     pantoprazole  40 mg Oral QAM AC     polyethylene glycol  17 g Oral DAILY     senna-docusate  1 tablet Oral BID     Continuous  Infusions:    niCARdipine Stopped (10/25/17 1115)     nacl 0.9% Stopped (10/29/17 2300)     PRN Meds:.acetaminophen **OR** acetaminophen, bisacodyl, dextrose 50 % (D50W), glucagon (human recombinant), hydrALAZINE, HYDROmorphone, labetalol, magnesium hydroxide, naloxone **OR** naloxone, ondansetron **OR** ondansetron    Pertinent Radiology   Radiology Results: Personally reviewed image/s    Advanced Care Planning:  Treatment/Discharge Planning discussed with the patient and nursing staff    Total time with this patient is 25 minutes with greater than 50% of time spent in coordination of care.      Polina Bailey MD  Internal Medicine Hospitalist  10/30/2017

## 2021-06-13 NOTE — PROGRESS NOTES
.  PULMONARY / CRITICAL CARE PROGRESS NOTE    Date / Time of Admission:  10/21/2017 12:54 PM    Assessment:   Subarachnoid hemorrhage  Status post clipping of 4 aneurysms  Essential hypertension  Probable seizure    Advance Directives: Full    Plan:   He underwent left craniotomy with clipping of 4 aneurysms.  She also had a ventriculostomy drain placed.  See the ICU on nicardipine drip with plan to keep his systolic blood pressure less than 150.  Ventriculostomy is set at 10  Continue nimodipine  Starting day 4 he will need transcranial Dopplers        Subjective:   HPI:  This is a 72 y.o. male with no pmh who was admitted after a syncopal event when he was shopping today. Apparently, he had experienced sudden onset dizziness and lightheadedness associated with nausea and vomiting, followed, by a syncopal episode.  This was witnessed by patient's father who was accompanying him.  In route, patient vomited in the ambulance.  He was noted to be incontinent of urine.  In the ED, he underwent a CT scan of the head that showed acute subarachnoid hemorrhage centered within the basal cisterns.  This was followed by a CTA that revealed 4.6 x 3.4 mm anteriorly directed anterior communicating artery aneurysm.  Patient underwent cerebral angiogram that revealed multiple intracranial aneurysms.  Decision was then made to undergo open surgical exploration and clipping.    Principal Problem:    SAH (subarachnoid hemorrhage)  Active Problems:    Benign essential HTN    Hydrocephalus      Allergies: No Known Allergies     MEDS:  [unfilled]      Current Facility-Administered Medications:      acetaminophen tablet 650 mg (TYLENOL), 650 mg, Oral, Q4H PRN **OR** acetaminophen suppository 650 mg (TYLENOL), 650 mg, Rectal, Q4H PRN, Jensen Garcia MD     bisacodyl suppository 10 mg (DULCOLAX), 10 mg, Rectal, Daily PRN, Jensen Garcia MD     dexamethasone injection 10 mg (DECADRON), 10 mg, Intravenous, On Call to ORLita  Reese-Lana, CNP     dexmedetomidine 400 mcg/100 mL in NS (PRECEDEX) (4mcg/mL), 0.2-1.4 mcg/kg/hr, Intravenous, Continuous, Ashley Jiménez CRNA, Stopped at 10/22/17 1256     dextrose 50 % (D50W) syringe 20-50 mL, 20-50 mL, Intravenous, PRN, Valentino Issa DO     famotidine 20 mg/2 mL injection 20 mg (PEPCID), 20 mg, Intravenous, BID, 20 mg at 10/21/17 2104 **OR** famotidine tablet 20 mg (PEPCID), 20 mg, Oral, BID, Jensen Garcia MD     glucagon (human recombinant) injection 1 mg, 1 mg, Subcutaneous, PRN, Valentino Issa DO     hydrALAZINE injection 10 mg (APRESOLINE), 10 mg, Intravenous, Q1H PRN, Jensen Garcia MD     insulin aspart injection (NovoLOG), , Subcutaneous, Q4H FIXED TIMES, Valentino Issa DO     labetalol injection 10 mg (NORMODYNE,TRANDATE), 10 mg, Intravenous, Q1H PRN, Jensen Garcia MD, 10 mg at 10/22/17 0020     levETIRAcetam 500 mg in sodium chloride 0.9% 100 mL (KEPPRA), 500 mg, Intravenous, Q12H, Lita Araya CNP, Last Rate: 200 mL/hr at 10/22/17 0318, 500 mg at 10/22/17 0318     magnesium hydroxide suspension 30 mL (MILK OF MAG), 30 mL, Oral, Daily PRN, Jensen Garcia MD     naloxone injection 0.2-0.4 mg (NARCAN), 0.2-0.4 mg, Intravenous, PRN **OR** naloxone injection 0.2-0.4 mg (NARCAN), 0.2-0.4 mg, Intramuscular, PRN, Chasity Stanley MD     niCARdipine 40mg/200 ml in NS (0.2 mg/ml), 1-15 mg/hr, Intravenous, Continuous, Edin Reyes MD     niMODipine capsule 60 mg (NIMOTOP), 60 mg, Oral, Q4H **OR** niMODipine oral solution 60 mg (NYMALIZE), 60 mg, Enteral Tube, Q4H, Jensen Garcia MD     ondansetron injection 4 mg (ZOFRAN), 4 mg, Intravenous, Q4H PRN **OR** ondansetron tablet 8 mg (ZOFRAN), 8 mg, Oral, Q8H PRN, Jensen Garcia MD     polyethylene glycol packet 17 g (MIRALAX), 17 g, Oral, DAILY, Jensen Garcia MD     senna-docusate 8.6-50 mg tablet 1 tablet (PERICOLACE), 1 tablet, Oral, BID, Jensen Garcia MD     sodium chloride 0.9 % with KCl 20 mEq/L infusion,  "125 mL/hr, Intravenous, Continuous, Lisette Espinal MD, Last Rate: 125 mL/hr at 10/22/17 1609, 125 mL/hr at 10/22/17 1609    Objective:   VITALS:  /61  Pulse 74  Temp 97.8  F (36.6  C) (Axillary)   Resp 9  Ht 5' 8\" (1.727 m)  Wt 201 lb 4.5 oz (91.3 kg)  SpO2 97%  BMI 30.6 kg/m2  EXAM:  General appearance: Sleepy but does wake up with verbal stimuli  Head: Head is wrapped.  Ventriculostomy in place.  Draining fairly clear CSF  Lungs: clear to auscultation bilaterally  Heart: regular rate and rhythm, S1, S2 normal, no murmur, click, rub or gallop  Abdomen: soft, non-tender; bowel sounds normal; no masses,  no organomegaly  Extremities: extremities normal, atraumatic, no cyanosis or edema  Neurologic: Lifts both upper extremities.  Seems that the right side is a tad weaker than the left side.  Bilateral lower extremities he wiggles toes and tries to lift off the bed.  His neuro exam is overall improving as he is coming off anesthesia.    I&O:    Intake/Output Summary (Last 24 hours) at 10/22/17 1617  Last data filed at 10/22/17 1445   Gross per 24 hour   Intake          4816.17 ml   Output             2580 ml   Net          2236.17 ml       Data Review:  CBC:   Lab Results   Component Value Date    WBC 3.9 (L) 10/21/2017    RBC 4.95 10/21/2017     BMP:   Lab Results   Component Value Date    CO2 25 10/21/2017    BUN 12 10/21/2017    CREATININE 0.79 10/21/2017    CALCIUM 9.1 10/21/2017     Serum Glucose range:No results for input(s): POCGLU in the last 72 hours.          "

## 2021-06-13 NOTE — PROGRESS NOTES
NEUROSURGERY POST-OP NOTE:    ASSESSMENT:       Callie Reeseazan is POD # 4, Bleed Day #5 status post  LEFT SIDED CRANIOTOMY AND CLIPPING OF MULTIPLE INTRACRANIAL ANEURYSMS (left MCA, Left anterior choroidal, left carotid terminus, left CONCEPCION),  AND PLACEMENT OF VENTRICULOSTOMY  by Dr. Lisette Espinal. Complete occlusion of left sided clipped aneurysms. Left MCA aneurysm with clot present, likely to be cause of SAH. Patient still has one unsecured aneurysm on the right.         Confusion w/o agitation   Now w low grade temp nothing above 99.0 --WBC trending downward  TCD not suggestive of vasospasms   ICPs < 10  UC w no growth to date   Hypernatremia improved--now 145 down from 150         PLAN:         Patient Active Problem List   Diagnosis     SAH (subarachnoid hemorrhage)     Benign essential HTN  SBP Goal <160 >110.       Hydrocephalus  -Maintain Ventric at 10 cm, start wean tomorrow   -Q 2 neuro checks  -Elevate HOB at least or higher 30 degrees  -Precedex managed by  Intensivist   No sedating meds unless discussed w Neurosurgery   Track TCD and exam closely for sx/s of vasospasms     Aneurysm     Brain edema  Encephalopathy-Precedex as needed  w low RASS of 0-1-     Aneurysmal subarachnoid hemorrhage  -Continue TCDs day 3-7, monitor clinically for s/sx of vasospasm such as headache/stroke symptoms  -Would like to see patient euvolemia or positive volume status. Strict I/Os.  Clamp ventric for short periods and progress activity   Continue kelley to monitor I&O      Fever: Start IV Zosyn Cultures negative --Vanco stopped --follow      Seizure like activity: Keppra 500mg BID x 30 days      Blood loss anemia- Stable Track and follow for symptoms--considerations for transfusion if < 8.0     Hypernatremia: Improved--do not want to fall much lower--Chg'd back to NS  Goal normal sodium, not currently on sodium supplementation. Daily Sodiums                          Discharge Recommendations/Plan:  Barriers to  Discharge: yes, aneurysmal SAH with ventriculostomy placement.     Follow Up Plan: Right unsecured aneurysm will need to be addressed after this event, possible follow up with IR vs surveillance monitoring. Plan TBD per Dr. Lisette Espinal               HPI: Callie Crocker is POD # 2, bleed day #3 status post LEFT SIDED CRANIOTOMY AND CLIPPING OF MULTIPLE INTRACRANIAL ANEURYSMS, INTRA-OP ANGIOS AND PLACEMENT OF VENTRICULOSTOMY  by Dr. Lisette Espinal. He is 72 year old male presented after sudden onset of lightheadedness, dizziness, nausea and vomiting and syncopal event witnessed by his son. CT in the ED revealed subarachnoid hemorrhage, CTA 4.6 x 3.4 mm anteriorly directed CONCEPCION aneurysm. 4V revealed multiple aneurysms as detailed below. He was taken to the OR for clipping with Dr. Espinal. All four of the left sided aneurysms were clipped and completely occluded, the right sided aneurysm remains unsecured.      10/23- Became agitated overnight (into 10/24), concern for alcohol withdrawal, necessitated PRNs for agitation. Repeat head CT with pneumocephalus left frontal, interval decrease in size of SAH, ventricular system mildly decreased hydrocephalus, midline shift. Subacute infarct noted, not unexpected due to approach in surgery.  10/24- Coffee ground emesis, started on protonix. Repeat head CT reveals interval decrease in SAH, increased IVH, infarcts consistent with surgical intervention        OBJECTIVE:  Vital signs in last 24 hours  Temp:  [98.6  F (37  C)-99.9  F (37.7  C)] 98.8  F (37.1  C)  Heart Rate:  [71-97] 73  Resp:  [14-31] 16  BP: (115-177)/() 161/77  Body mass index is 31.27 kg/(m^2).    Mental Status: alert, personspeech normal  Cranial Nerves: Right sided facial weakness  Motor Strength:normal 5/5 strength in all tested muscle groups  Incision: Incision was approximated with staples and covered with Other: open to air  The incision is clean, dry and no drainage   Ventric patency  confirmed. Good wave form.  ICPs < 10  Dilute bloody 99 cc last 24 hours       Pertinent Labs   Lab Results:   Results for MAK ORTIZ (MRN [id number]) as of 10/27/2017 16:12   Ref. Range 10/26/2017 13:20 10/26/2017 15:20 10/27/2017 00:40 10/27/2017 04:11 10/27/2017 11:16   Sodium Latest Ref Range: 136 - 145 mmol/L 150 (H) 141 137 145 144

## 2021-06-13 NOTE — PROGRESS NOTES
"  Clinical Nutrition Therapy Follow Up Note    Pt transfer has been delayed d/t MA application.    Current Nutrition Prescription:   Diet: diabetic  Supplements and Modulars:chocolate Boost GC BID    Current Nutrition Intake:  Intake is decreased past 4 days.  Pt very confused with severe range score on cognitive tests.  He is eating 0-75% of smaller meals.  He normally eats beans and dairy as his protein sources, but is receiving meat in the hospital per his daughter's suggestions.  His dentures are loose-fitting and he needs denture adhesive.      Anthropometrics:  Height: 5' 8\" (172.7 cm)  Admission weight:201#  Weight: 186 lb 12.8 oz (84.7 kg)  BMI 28.4  BMI indication: 25-29.9 overweight  Ideal body weight 154#  Weight History:loss of 7% weight in 2 weeks    Physical Findings:  The patient has the following physical signs which could indicate malnutrition: generalized edema +1    GI Status/Output:   The patient's GI symptoms include: loose stools; last BM yesterday    Skin/Wound:  Harry score Harry Scale Score: 19    Labs:  Labs reviewed.  .    Malnutrition: Patient meets diagnostic criteria for severe protein calorie malnutrition in the context of acute illness as evidenced by  unintentional weight loss and poor nutrient intake    Nutrition Risk Level: moderate risk    Nutrition dx:  severe protein calorie malnutrition in the context of acute illness d/t confusion and food preferences as evidenced by 7% unintentional weight loss since admission and <50% estimated energy needs for >5 days    Goal:  Avoid wt loss in excess of 2# per week    Intervention:  Continue diabetic diet selective menu, Boost GC.  Pt is not appropriate for diabetic diet ed d/t cognition.      Monitoring:  Weight, po intake    See Care Plan for Problems, Goals, and Interventions.  "

## 2021-06-13 NOTE — PROGRESS NOTES
Hospitalist Progress Note      Date of Service: 11/6/2017     Brief summary:   Callie Olivier a 72 y.o.male with past medical history of hypertension who presented 10/21/17 after a witnessed syncopal event. He had sudden onset dizziness, light headedness, nausea, and vomiting. He was taken to ER, CT scan showed acute subarachnoid hemorrhage centered within the basal cisterns; CTA revealed 4.6 x 3.4 mm anteriorly directed CONCEPCION aneurysm. Cerebral angiogram revealed multiple intracranial aneurysms. He underwent emergent left sided crani and clipping of left MCA, left anterior choroidal, left carotid terminus, left CONCEPCION; and placement of ventriculostomy by Dr. RENO Espinal. There remains one unsecured aneurysm on the right. Neurosurgery removed drain 11/1/17. Due to concern about fluctuating mental status and possible seizures continuous EEG performed by Neuro and Keppra dose increased. No clear evidence of seizures. No vasospasm on TCD.     Assessment and Plan     Principal Problem:    SAH (subarachnoid hemorrhage)  Active Problems:    Benign essential HTN    Obstructive hydrocephalus    Brain edema    Aneurysmal subarachnoid hemorrhage    Nontraumatic subarachnoid hemorrhage    Encephalopathy acute    Seizure-like activity    Hypernatremia    Brain compression    Principal Problem:    SAH (subarachnoid hemorrhage) POD#15, PBD #156 - secondary to ruptured cerebral aneurysm s/p crani with multiple clips    Obstructive Hydrocephalus - External ventricular drain removed 11/1/17 by Neurosurgery        Fluctuating mental status/Acute encephalopathy:   Very slow to improve.  Confused and impulsivity.   No clear seizure on EEG and no vasospasm on TCDs.  Keppra  Low dose seroquel.  This will take quite awhile to improve.        Active Problems:    Fevers on admission (resolved now) - possibly central.  Urine Cx negative. Procalcitonin negative and CXR without infiltrates to suggest pneumonia.  Completed 5 days of empiric  "Zosyn.        Benign essential HTN - SBP goal 110-160. Continue nimodipine,labetalol and lisinopril        Brain edema - keep sodium in normal to upper normal range       Hyperglycemia - BG monitoring qid and coverage with SSI    Subjective:     Interval History:  Still quite confused per nursing staff.     Chart reviewed, events noted. Pt seen and examined.     Objective     Vital signs in last 24 hours:  Temp:  [97.2  F (36.2  C)-98.4  F (36.9  C)] 98  F (36.7  C)  Heart Rate:  [58-81] 69  Resp:  [12-21] 19  BP: ()/(47-82) 120/57    /57  Pulse 69  Temp 98  F (36.7  C)  Resp 19  Ht 5' 8\" (1.727 m)  Wt 186 lb 12.8 oz (84.7 kg)  SpO2 98%  BMI 28.4 kg/m2    Weight:    186 lb 12.8 oz (84.7 kg)  Weight change:   Body mass index is 28.4 kg/(m^2).  BS reviewed     Intake/Output last 3 shifts:   I/O last 3 completed shifts:  In: 500 [I.V.:500]  Out: 100 [Urine:100]  Intake/Output this shift:       O2 Device: None (Room air) O2 Flow Rate (L/min): 0 L/min    Physical Exam:       General- sleeping, comfortable    HEENT- Atraumatic    Chest- B/L air entry, no wheeze, no crackles, not in distress      CVS- S1S2 regular    Abd- Soft, non tender, non distended    CNS- sleeping     Ext- B/L no pedal edema      Imaging:  personally reviewed.        Lab Results:  personally reviewed.   Lab Results   Component Value Date     11/06/2017    K 4.1 11/06/2017     (H) 11/06/2017    CO2 15 (L) 11/06/2017    BUN 26 11/06/2017    CREATININE 0.84 11/06/2017    CALCIUM 9.4 11/06/2017     Lab Results   Component Value Date    WBC 5.4 11/06/2017    HGB 12.8 (L) 11/06/2017    HCT 37.4 (L) 11/06/2017    MCV 91 11/06/2017     11/06/2017     Recent Results (from the past 24 hour(s))   POCT Glucose    Collection Time: 11/05/17 11:50 AM   Result Value Ref Range    Glucose,  mg/dL   POCT Glucose    Collection Time: 11/05/17  4:18 PM   Result Value Ref Range    Glucose,  mg/dL   POCT Glucose    " Collection Time: 11/05/17  8:53 PM   Result Value Ref Range    Glucose,  mg/dL   Basic Metabolic Panel    Collection Time: 11/06/17  4:37 AM   Result Value Ref Range    Sodium 137 136 - 145 mmol/L    Potassium 4.1 3.5 - 5.0 mmol/L    Chloride 113 (H) 98 - 107 mmol/L    CO2 15 (L) 22 - 31 mmol/L    Anion Gap, Calculation 9 5 - 18 mmol/L    Glucose 130 (H) 70 - 125 mg/dL    Calcium 9.4 8.5 - 10.5 mg/dL    BUN 26 8 - 28 mg/dL    Creatinine 0.84 0.70 - 1.30 mg/dL    GFR MDRD Af Amer >60 >60 mL/min/1.73m2    GFR MDRD Non Af Amer >60 >60 mL/min/1.73m2   HM2(CBC W/O DIFF)    Collection Time: 11/06/17  4:37 AM   Result Value Ref Range    WBC 5.4 4.0 - 11.0 thou/uL    RBC 4.12 (L) 4.40 - 6.20 mill/uL    Hemoglobin 12.8 (L) 14.0 - 18.0 g/dL    Hematocrit 37.4 (L) 40.0 - 54.0 %    MCV 91 80 - 100 fL    MCH 31.1 27.0 - 34.0 pg    MCHC 34.2 32.0 - 36.0 g/dL    RDW 15.1 (H) 11.0 - 14.5 %    Platelets 373 140 - 440 thou/uL    MPV 10.2 8.5 - 12.5 fL           Advanced Care Planning:     Barriers to discharge: Persistent encephalopathy placement issues  Anticipated discharge day:    Discussed with RNCLARK  Disposition: Acute rehab vs LTACH    Ranjit Humphrey MD  [facility]  Hospitalist  Pager [phone]

## 2021-06-13 NOTE — PROGRESS NOTES
Progress Note    Assessment/Plan  71 yo M with h/o hypertension who presented 10/21/17 after a witnessed syncopal event. He had sudden onset dizziness, light headedness, nausea, and vomiting. He was taken to ER, CT scan showed acute subarachnoid hemorrhage centered within the basal cisterns; CTA revealed 4.6 x 3.4 mm anteriorly directed CONCEPCION aneurysm. Cerebral angiogram revealed multiple intracranial aneurysms. He underwent emergent left sided crani and clipping of left MCA, left anterior choroidal, left carotid terminus, left CONCEPCION; and placement of ventriculostomy by Dr. RENO Espinal. There remains one unsecured aneurysm on the right. Neurosurgery removed drain 11/1/17. Due to concern about fluctuating mental status and possible seizures continuous EEG performed by Neuro and Keppra dose increased. No clear evidence of seizures.     Principal Problem:    SAH (subarachnoid hemorrhage) POD#12, PBD #13 - secondary to ruptured cerebral aneurysm s/p crani with multiple clips       Obstructive Hydrocephalus - External ventricular drain removed 11/1/17 by Neurosurgery        Fluctuating mental status/Acute encephalopathy:   Overall mental status is slightly better last 2 days but still confused and impulsive  Due to concern for seizures, Keppra dose increased.  No signs or symptoms of infection. No fevers or leukocytosis.       Active Problems:    Fevers on admission (resolved now)     possibly central.     Urine Cx negative. Procalcitonin negative and CXR without infiltrates to suggest pneumonia.  Completed 5 days of empiric Zosyn.       Benign essential HTN - SBP goal 110-160. Continue nimodipine,labetalol and lisinopril       Brain edema - keep sodium in normal to upper normal range      Hyperglycemia - BG monitoring qid and coverage with SSI        Barriers to Discharge : Ongoing Encephalopathy and placement issues.    Anticipated Discharge : Will likely benefit from [facility] or [facility]      Principal Problem:    SAH  (subarachnoid hemorrhage)  Active Problems:    Benign essential HTN    Obstructive hydrocephalus    Brain edema    Aneurysmal subarachnoid hemorrhage    Nontraumatic subarachnoid hemorrhage    Encephalopathy acute    Seizure-like activity    Hypernatremia    Brain compression      Subjective  Patient continues to follow commands but not able to answer where he is. Continues to mumble in a confused manner when  tries speaking to him. ROS negative but limited. He did slide to the floor today from his chair and his PICC line got pulled out.     Objective    Vital signs in last 24 hours  Temp:  [97.5  F (36.4  C)-98  F (36.7  C)] 97.5  F (36.4  C)  Heart Rate:  [51-66] 58  Resp:  [11-34] 13  BP: ()/(56-91) 108/56  Weight:   186 lb 12.8 oz (84.7 kg)    Intake/Output last 3 shifts  I/O last 3 completed shifts:  In: 300 [P.O.:300]  Out: -   Intake/Output this shift:       Physical Exam  General appearance: confused, expressive aphasia, but cooperative and following simple one step commands  Lungs: clear to auscultation bilaterally  Heart: regular rate and rhythm, S1, S2 normal  Abdomen: soft, non-tender  Extremities: atraumatic, no cyanosis or edema  Neurologic: moving all 4 extremities spontaneously            Meds    nacl 0.9% Stopped (10/29/17 2300)       influenza vacc high dose (age 65 or >) (PF) 2017-18  0.5 mL Intramuscular Prior to Discharge     insulin aspart (NovoLOG) injection   Subcutaneous TID with meals     insulin aspart (NovoLOG) injection   Subcutaneous QHS     labetalol  100 mg Oral TID     levETIRAcetam  1,000 mg Oral BID     lidocaine (XYLOCAINE) 1 % local injection  10 mL Infiltration Once     lisinopril  40 mg Oral DAILY     niMODipine  60 mg Oral Q4H    Or     niMODipine  60 mg Enteral Tube Q4H     pantoprazole  40 mg Oral QAM AC     polyethylene glycol  17 g Oral DAILY     senna-docusate  1 tablet Oral BID       Pertinent Labs   Lab Results: personally reviewed.   not  applicable      Results from last 7 days  Lab Units 11/03/17  0444 11/02/17  0422 11/01/17 0451   LN-SODIUM mmol/L 136 136 137   LN-POTASSIUM mmol/L 3.9 4.0 4.1   LN-CHLORIDE mmol/L 110* 108* 111*   LN-CO2 mmol/L 18* 18* 18*   LN-BLOOD UREA NITROGEN mg/dL 19 19 18   LN-CREATININE mg/dL 0.75 0.81 0.80   LN-CALCIUM mg/dL 9.2 9.4 9.1           Results from last 7 days  Lab Units 11/03/17 0444 11/02/17  0422 11/01/17 0451   LN-WHITE BLOOD CELL COUNT thou/uL 5.2 6.6 7.4   LN-HEMOGLOBIN g/dL 12.0* 12.1* 11.6*   LN-HEMATOCRIT % 35.6* 35.3* 34.1*   LN-PLATELET COUNT thou/uL 395 402 266

## 2021-06-13 NOTE — PROGRESS NOTES
"HOSPITALIST PROGRESS NOTE    Date of Service:  10/24/2017    Primary Care Physician: No Primary Care Provider    Hospital Summary: Callie Crocker is a 72 y.o. male with a history of admitted for lightheadedness dizziness nausea vomiting syncopal episode.  In the ED on evaluation had an acute subarachnoid hemorrhage with a anterior communicating aneurysm.  Underwent cerebral angiography showing multiple intracranial aneurysms.  Patient had clipping of the left MCA and other aneurysms with ventriculostomy placement, close management by neurology and neurosurgery as noted.    SUBJECTIVE:      OBJECTIVE:  General Appearance:   BP Readings from Last 3 Encounters:   10/23/17 162/52       /52  Pulse 78  Temp 99.2  F (37.3  C) (Oral)   Resp 21  Ht 5' 8\" (1.727 m)  Wt 208 lb 12.4 oz (94.7 kg)  SpO2 92%  BMI 31.74 kg/m2      GENERAL: Awake     I reviewed the patient's new imaging results.      Assessment and Plan:    Plans for 10/24/2017  -at This point management per neurology and neurosurgery    Acute intracranial hemorrhage  Subarachnoid hemorrhage from aneurysm  Assessment: Status post clipping per neurosurgery being managed closely by neurology and neurosurgery  Plan:  We continue to follow    Hypertension  Assessment: Blood pressure being managed including lisinopril and nimodipine  Plan:  -Continue    Lenard Hoover M.D.  [facility] hospitalist service  7 AM to 6 PM please page through [employer] home page  "

## 2021-06-13 NOTE — PROGRESS NOTES
Met with patient's son and daughter-in-law, assisted with application for Emergency Medical Assistance (EMA) which appears to be the only possible MA option for patient. As discussed with family, EMA generally does not pay for anything outside of the hospital (no follow up care or discharge meds etc) except in very specific cases where a EMA Care Plan request is submitted to the state and approved by the state for ongoing emergent care that would be life-threatening to go without. Will assist patient and family with follow up on [facility] application. Raghav, SIOMARA [id number]

## 2021-06-13 NOTE — PROGRESS NOTES
Progress Note    Assessment/Plan  73 yo M with h/o hypertension who presented 10/21/17 after a witnessed syncopal event. He had sudden onset dizziness, light headedness, nausea, and vomiting. He was taken to ER, CT scan showed acute subarachnoid hemorrhage centered within the basal cisterns; CTA revealed 4.6 x 3.4 mm anteriorly directed CONCEPCION aneurysm. Cerebral angiogram revealed multiple intracranial aneurysms. He underwent emergent left sided crani and clipping of left MCA, left anterior choroidal, left carotid terminus, left CONCEPCION; and placement of ventriculostomy by Dr. RENO Espinal. There remains one unsecured aneurysm on the right. Neurosurgery removed drain 11/1/17. Due to concern about fluctuating mental status and possible seizures continuous EEG performed by Neuro and Keppra dose increased.      Principal Problem:    SAH (subarachnoid hemorrhage) POD#10, PBD #11 - secondary to ruptured cerebral aneurysm s/p crani with multiple clips      Obstructive Hydrocephalus - External ventricular drain removed 11/1/17 by Neurosurgery       Fluctuating mental status/Acute encephalopathy:   Overall mental status is slightly better today  Due to concern for seizures, Keppra dose increased.  No signs or symptoms of infection. No fevers or leukocytosis.       Active Problems:    Fevers on admission (resolved now)     possibly central.     Urine Cx negative. Procalcitonin negative and CXR without infiltrates to suggest pneumonia.  Completed 5 days of empiric Zosyn.      Benign essential HTN - SBP goal 110-160. Continue nimodipine,labetalol and lisinopril      Brain edema - keep sodium in normal to upper normal range     Hyperglycemia - BG monitoring qid and coverage with SSI        Barriers to Discharge : Ongoing Encephalopathy    Anticipated Discharge : Will likely benefit from [place] or [facility]      Principal Problem:    SAH (subarachnoid hemorrhage)  Active Problems:    Benign essential HTN    Obstructive hydrocephalus     Brain edema    Aneurysmal subarachnoid hemorrhage    Nontraumatic subarachnoid hemorrhage    Encephalopathy acute    Seizure-like activity    Hypernatremia    Brain compression      Subjective  Patient is confused. ROS negative.    Objective    Vital signs in last 24 hours  Temp:  [97.9  F (36.6  C)-98.9  F (37.2  C)] 98.1  F (36.7  C)  Heart Rate:  [64-74] 68  Resp:  [12-26] 20  BP: (117-157)/(61-79) 117/61  Weight:   186 lb 12.8 oz (84.7 kg)    Intake/Output last 3 shifts  I/O last 3 completed shifts:  In: 400 [P.O.:400]  Out: 200 [Urine:200]  Intake/Output this shift:       Physical Exam  General appearance: alert and cooperative  Lungs: clear to auscultation bilaterally  Heart: regular rate and rhythm, S1, S2 normal  Abdomen: soft, non-tender  Extremities: atraumatic, no cyanosis or edema  Neurologic: moving all 4 extremities spontaneously, non focal        Meds    nacl 0.9% Stopped (10/29/17 2300)       influenza vacc high dose (age 65 or >) (PF) 2017-18  0.5 mL Intramuscular Prior to Discharge     insulin aspart (NovoLOG) injection   Subcutaneous TID with meals     insulin aspart (NovoLOG) injection   Subcutaneous QHS     labetalol  100 mg Oral TID     levETIRAcetam  1,000 mg Oral BID     lidocaine (XYLOCAINE) 1 % local injection  10 mL Infiltration Once     lisinopril  40 mg Oral DAILY     niMODipine  60 mg Oral Q4H    Or     niMODipine  60 mg Enteral Tube Q4H     pantoprazole  40 mg Oral QAM AC     polyethylene glycol  17 g Oral DAILY     senna-docusate  1 tablet Oral BID       Pertinent Labs   Lab Results: personally reviewed.   not applicable      Results from last 7 days  Lab Units 11/02/17  0422 11/01/17  0451 10/31/17  0502   LN-SODIUM mmol/L 136 137 136   LN-POTASSIUM mmol/L 4.0 4.1 3.8   LN-CHLORIDE mmol/L 108* 111* 110*   LN-CO2 mmol/L 18* 18* 19*   LN-BLOOD UREA NITROGEN mg/dL 19 18 17   LN-CREATININE mg/dL 0.81 0.80 0.75   LN-CALCIUM mg/dL 9.4 9.1 8.7           Results from last 7 days  Lab Units  11/02/17  0422 11/01/17  0451 10/31/17  0502   LN-WHITE BLOOD CELL COUNT thou/uL 6.6 7.4 6.9   LN-HEMOGLOBIN g/dL 12.1* 11.6* 11.3*   LN-HEMATOCRIT % 35.3* 34.1* 32.6*   LN-PLATELET COUNT thou/uL 402 266 310

## 2021-06-13 NOTE — PROGRESS NOTES
Progress Note    Assessment/Plan  73 yo M with h/o hypertension who presented 10/21/17 after a witnessed syncopal event. He had sudden onset dizziness, light headedness, nausea, and vomiting. He was taken to ER, CT scan showed acute subarachnoid hemorrhage centered within the basal cisterns; CTA revealed 4.6 x 3.4 mm anteriorly directed CONCEPCION aneurysm. Cerebral angiogram revealed multiple intracranial aneurysms. He underwent emergent left sided crani and clipping of left MCA, left anterior choroidal, left carotid terminus, left CONCEPCION; and placement of ventriculostomy by Dr. RENO Espinal. There remains one unsecured aneurysm on the right. Neurosurgery removed drain 11/1/17. Due to concern about fluctuating mental status and possible seizures continuous EEG performed by Neuro and Keppra dose increased. No clear evidence of seizures. No vasospasm on TCD.      Principal Problem:    SAH (subarachnoid hemorrhage) POD#13, PBD #14 - secondary to ruptured cerebral aneurysm s/p crani with multiple clips        Obstructive Hydrocephalus - External ventricular drain removed 11/1/17 by Neurosurgery        Fluctuating mental status/Acute encephalopathy:   Overall mental status is slightly better last 2-3 days but still confused and impulsive requiring soft wrist restraints.  Spit out meds today.  No clear seizure on EEG and no vasospasm on TCDs.  Due to concern for seizures, Keppra dose increased.  No signs or symptoms of infection. No fevers or leukocytosis.  Patient could have some underlying psych illness at baseline.   Plus ICU psychosis in the setting of acute neurological issues as above.   Add small dose Seroquel and consult psych.       Active Problems:    Fevers on admission (resolved now)     possibly central.     Urine Cx negative. Procalcitonin negative and CXR without infiltrates to suggest pneumonia.  Completed 5 days of empiric Zosyn.        Benign essential HTN - SBP goal 110-160. Continue nimodipine,labetalol and  lisinopril        Brain edema - keep sodium in normal to upper normal range       Hyperglycemia - BG monitoring qid and coverage with SSI        Barriers to Discharge : Ongoing Encephalopathy and placement issues.    Anticipated Discharge : Will likely benefit from [place] or [facility]         Principal Problem:    SAH (subarachnoid hemorrhage)  Active Problems:    Benign essential HTN    Obstructive hydrocephalus    Brain edema    Aneurysmal subarachnoid hemorrhage    Nontraumatic subarachnoid hemorrhage    Encephalopathy acute    Seizure-like activity    Hypernatremia    Brain compression      Subjective  Patient resting comfortably. Still confused so ROS very limited. Pain free and following simple one step commands.    Objective    Vital signs in last 24 hours  Temp:  [97.5  F (36.4  C)-98.6  F (37  C)] 97.6  F (36.4  C)  Heart Rate:  [51-64] 61  Resp:  [12-34] 26  BP: (103-134)/(56-74) 114/59  Weight:   186 lb 12.8 oz (84.7 kg)    Intake/Output last 3 shifts     Intake/Output this shift:       Physical Exam  General appearance: confused and pulling at lines and picking at incision site on scalp. Very impulsive and getting out of bed and chair. Requiring soft wrist restraints.  Lungs: clear to auscultation bilaterally  Heart: regular rate and rhythm, S1, S2 normal  Abdomen: soft, non-tender  Extremities: atraumatic, no cyanosis or edema  Neurologic: moving all 4 extremities spontaneously            Meds    nacl 0.9% Stopped (10/29/17 2300)       influenza vacc high dose (age 65 or >) (PF) 2017-18  0.5 mL Intramuscular Prior to Discharge     insulin aspart (NovoLOG) injection   Subcutaneous TID with meals     insulin aspart (NovoLOG) injection   Subcutaneous QHS     labetalol  100 mg Oral TID     levETIRAcetam  1,000 mg Oral BID     lidocaine (XYLOCAINE) 1 % local injection  10 mL Infiltration Once     lisinopril  40 mg Oral DAILY     niMODipine  60 mg Oral Q4H    Or     niMODipine  60 mg Enteral Tube Q4H      pantoprazole  40 mg Oral QAM AC     polyethylene glycol  17 g Oral DAILY     senna-docusate  1 tablet Oral BID       Pertinent Labs   Lab Results: personally reviewed.   not applicable      Results from last 7 days  Lab Units 11/04/17  0606 11/03/17 0444 11/02/17 0422   LN-SODIUM mmol/L 140 136 136   LN-POTASSIUM mmol/L 4.0 3.9 4.0   LN-CHLORIDE mmol/L 113* 110* 108*   LN-CO2 mmol/L 17* 18* 18*   LN-BLOOD UREA NITROGEN mg/dL 25 19 19   LN-CREATININE mg/dL 0.82 0.75 0.81   LN-CALCIUM mg/dL 9.5 9.2 9.4           Results from last 7 days  Lab Units 11/04/17  0606 11/03/17 0444 11/02/17 0422   LN-WHITE BLOOD CELL COUNT thou/uL 5.3 5.2 6.6   LN-HEMOGLOBIN g/dL 13.0* 12.0* 12.1*   LN-HEMATOCRIT % 38.8* 35.6* 35.3*   LN-PLATELET COUNT thou/uL 428 395 402

## 2021-06-13 NOTE — PROGRESS NOTES
NEUROSURGERY POST-OP NOTE:    ASSESSMENT:     Callie Crocker is POD # 10, bleed day #11  status post  LEFT SIDED CRANIOTOMY AND CLIPPING OF MULTIPLE INTRACRANIAL ANEURYSMS, INTRA-OP ANGIOS AND PLACEMENT OF VENTRICULOSTOMY  by Dr. Lisette Espinal.   Complete occlusion of left sided clipped aneurysms. Left MCA aneurysm with clot present, likely to be cause of SAH. Patient still has one unsecured aneurysm on the right.    Drain out yesterday. EEG did not show seizure activity. Neurology questioning vasospasm but had one spike on 10/30 to 83 in the BA and the next day the velocity of the BA was 35. No other areas of increased velocities.        PLAN:     Patient Active Problem List   Diagnosis     SAH (subarachnoid hemorrhage)     Benign essential HTN     1.   - 160.       Obstructive hydrocephalus     1.  Continue every 2 hour neuro checks overnight.  His alertness waxes and wanes.      2.  HOB elevated.      Aneurysm     Brain edema     Aneurysmal subarachnoid hemorrhage     Nontraumatic subarachnoid hemorrhage     1.  TCD today. If normal will not order further.      2.   Monitor post operative left fluid collection, which is better today.      3.   Crani cap and clean gauze to incision.     4.  Patient needs normal glucose for wound healing.      Encephalopathy acute     Seizure-like activity     1.  EEG shows no seizure activity. Discontinue EEG     Hypernatremia     Brain compression      1.  Goal normal sodium at this time.           Discharge Recommendations/Plan:  Barriers to Discharge: yes  Continued acute medical management.     Follow Up Plan: right unsecured aneurysm will need to be addressed after this event. Follow up with IR versus surveillance monitoring.     Plan TBD by Dr. Espinal.          HPI: Callie Crocker is status post LEFT SIDED CRANIOTOMY AND CLIPPING OF MULTIPLE INTRACRANIAL ANEURYSMS, INTRA-OP ANGIOS AND PLACEMENT OF VENTRICULOSTOMY  by Dr. Lisette Espinal. He is 72 year  "old male presented after sudden onset of lightheadedness, dizziness, nausea and vomiting and syncopal event witnessed by his son. CT in the ED revealed subarachnoid hemorrhage, CTA 4.6 x 3.4 mm anteriorly directed CONCEPCION aneurysm. 4V revealed multiple aneurysms as detailed below. He was taken to the OR for clipping with Dr. Espinal. All four of the left sided aneurysms were clipped and completely occluded, the right sided aneurysm remains unsecured.    SUBJECTIVE:  Lethargy waxes and wanes. He was up walking twice. Follows commands.       OBJECTIVE:  Vital signs in last 24 hours  Temp:  [97.9  F (36.6  C)-98.9  F (37.2  C)] 98.4  F (36.9  C)  Heart Rate:  [64-84] 71  Resp:  [13-29] 14  BP: (107-157)/(54-79) 118/63  Body mass index is 28.4 kg/(m^2).    Mental Status: He is awake, tells me he is in his office. Unable to tell me year.    PERRL, following commands and then will shut his eyes and stop interacting.  Speech is not making sense according to the . He did tell the  that he wants to be \"sane\" again so he can communicate.       Incision: Incision was approximated with staples and covered with dry gauze.  The incision is clean and dry.      Bowel Management:  Prophylaxis yes   Last BM:  yesterday    Pertinent Labs   Lab Results:   Lab Results   Component Value Date     11/02/2017    K 4.0 11/02/2017     (H) 11/02/2017    CO2 18 (L) 11/02/2017    BUN 19 11/02/2017    CREATININE 0.81 11/02/2017    CALCIUM 9.4 11/02/2017     Lab Results   Component Value Date    WBC 6.6 11/02/2017    HGB 12.1 (L) 11/02/2017    HCT 35.3 (L) 11/02/2017    MCV 90 11/02/2017     11/02/2017       Pertinent Radiology   Radiology Results: Not today    ADDITIONAL COMMENTS:The patient's clinical examination, laboratory data, and plan was discussed with Dr. Lisette Espinal.      Khalida Swanson NP  [facility]  [phone]                        "

## 2021-06-13 NOTE — PROGRESS NOTES
NEUROSURGERY POST-OP NOTE:    ASSESSMENT:     Callie Crocker is POD # 1, Bleed Day #2 status post  LEFT SIDED CRANIOTOMY AND CLIPPING OF MULTIPLE INTRACRANIAL ANEURYSMS (left MCA, Left anterior choroidal, left carotid terminus, left CONCEPCION), INTRA-OP ANGIOS AND PLACEMENT OF VENTRICULOSTOMY  by Dr. Lisette Espinal. Complete occlusion of left sided clipped aneurysms. Left MCA aneurysm with clot present, likely to be cause of SAH. Patient still has one unsecured aneurysm on the right, however there is greater concern for vasospasm so BP goal at this time <160. Patient overall doing well despite burden of hemorrhage, arouses to verbal stim and follows commands. Confusion is present and not unexpected.     Start TCDs today, continue nimodipine for 21 days. Would not consider weaning ventric until bleed day #7 and if concern for vasospasm is low at that time. TCDs today without evidence of vasospasm, however they were unable to read the left CONCEPCION due to artifact from suture.  BP goal <160, has necessitated several PRNs. Okay to restart home lisinopril.    PLAN:   Patient Active Problem List   Diagnosis     SAH (subarachnoid hemorrhage)     Benign essential HTN  -Hospitalist management, BP Goal <160.   -Restarting home lisinopril  -PRNs changed to reflect BP goal, has labetalol, hydralazine, nicardipine as needed     Hydrocephalus  -Maintain ventriculostomy drain, please clamp to reposition or move to chair and re-level.  -Check ICPs hourly, call if >20 for 5 minutes  -Maintain open drain at 10cm above the tragus   -Hourly neuro checks  -Elevated HOB >30 degrees  -Keep ventric site covered with sterile dressing, crani cap type gauze.  -Monitor for CSF leak  -ICU status due to ventric  -Repeat CT if change in exam  -Decrease dilaudid to .2mg q3, do not want patient sedated      Aneurysm     Brain edema     Aneurysmal subarachnoid hemorrhage  -TCDs bleed day #2-7, starting today  -Goal normal sodium, not currently on  sodium supplementation. Sodium today 137. Daily Sodiums  -Would like to see patient euvolemia or positive volume status. Strict I/Os.  -SLP eval and treat, okay to start diet from our perspective if okay with ST.  -Keppra 500mg BID x 1 month at least  -Consult neurology            Bowel and Bladder Plan:     [place]: yes  If yes, document reason / plan : strict intakes and outputs     Bowel Management:  Prophylaxis yes   Last BM: prior to admission   Medications Plan:     Antiepileptics: Yes keppra 500mg BID for at least one month if yes, plan      Anticoagulation/Antiplatelets: Hold all anticoagulants in the setting of hemorrhage     DVT Prophylaxis: intermittent pneumatic compression boots     GI Prophylaxis: yes          Discharge Recommendations/Plan:  Barriers to Discharge: yes, aneurysmal SAH with ventriculostomy placement.     Follow Up Plan: Right unsecured aneurysm will need to be addressed after this event, possible follow up with IR vs surveillance monitoring. Plan TBD per Dr. Lisette Espinal           HPI: Callie Reeseazan is POD # 1, bleed day #2 status post LEFT SIDED CRANIOTOMY AND CLIPPING OF MULTIPLE INTRACRANIAL ANEURYSMS, INTRA-OP ANGIOS AND PLACEMENT OF VENTRICULOSTOMY  by Dr. Lisette Espinal. He is 72 year old male presented after sudden onset of lightheadedness, dizziness, nausea and vomiting and syncopal event witnessed by his son. CT in the ED revealed subarachnoid hemorrhage, CTA 4.6 x 3.4 mm anteriorly directed CONCEPCION aneurysm. 4V revealed multiple aneurysms as detailed below. He was taken to the OR for clipping with Dr. Espinal. All four of the left sided aneurysms were clipped and completely occluded, the right sided aneurysm remains unsecured. Currently, he is extubated, following commands, complaining of mild headache, confused and impulsive. Patient is Sami speaking, son interprets for my exam today.      SUBJECTIVE:  Mild headache, keeps asking his son to use the bathroom. In  bilateral wrist restraints.     Patient is extubated, on nasal canula. Responsive, following commands, mild headache but confused and at times restless requiring restraints and IV dilaudid. Recently received so patient is drowsy but awakens to voice.    OBJECTIVE:  Vital signs in last 24 hours  Temp:  [97.8  F (36.6  C)-100.9  F (38.3  C)] 100  F (37.8  C)  Heart Rate:  [] 79  Resp:  [9-41] 13  BP: (115-150)/(50-65) 143/64  Arterial Line BP: ()/(40-79) 140/52  Body mass index is 31.74 kg/(m^2).    Mental Status: alert, oriented only to self. Not to place, time or situation. Speech is clear, patient is Indonesian speaking.  Cranial Nerves: PERRL, puffs cheeks equally bilaterally, tongue protrudes centrally and moves freely from side to side. Right sided pronator drift.   Motor Strength: Moves all extremities to command, right side appears slightly weaker than left in drift,  and movement of right lower extremity.     Incision: Incision was covered with surgical dressing which I did not remove.   Drain: left frontal Ventriculostomy in place with blood tinged drainage 43 ml of output in last 24 hours. Pulsatile. ICPs <10.    Pertinent Labs   Results for MAK ORTIZ (MRN [id number]) as of 10/23/2017 09:25   10/23/2017 04:53   Sodium 137   Potassium 3.7   Chloride 110 (H)   CO2 19 (L)   Anion Gap, Calculation 8   BUN 21   Creatinine 0.88   GFR MDRD Af Amer >60   GFR MDRD Non Af Amer >60   Calcium 8.0 (L)   Magnesium 1.8   Glucose 177 (H)   WBC 14.9 (H)   RBC 3.75 (L)   Hemoglobin 11.6 (L)   Hematocrit 32.7 (L)   MCV 87   MCH 30.9   MCHC 35.5   RDW 13.9   Platelets 181   MPV 9.7     Pertinent Radiology   Radiology Results:   4V revealed  4.6 x 5 x 6.1 mm anteriorly projecting mildly irregular saccular aneurysm of the anterior communicating artery at the left anterior cerebral artery A1/A2 segment junction with a relatively narrow 2.8 mm neck.3.6 x 3.4 x 3.3 mm inferiorly projecting bilobed saccular  aneurysm of the left anterior choroidal artery origin with a 2.1 mm neck, incorporating the anterior choroidal artery at its medial margin.     2.6 x 1.1 x 1.6 mm superiorly projecting broad-based aneurysm of the left carotid terminus.     2.1 x 1.7 x 2.4 mm laterally projecting saccular aneurysm of the left middle cerebral artery bifurcation with a broad 2 mm neck.     1.5 x 1.7 x 1.6 mm inferiorly projecting aneurysm of the right anterior choroidal artery origin with a broad 1.7 mm neck, incorporating the anterior choroidal artery at its anterior margin.    ADDITIONAL COMMENTS:The patient's clinical examination, laboratory data, and plan was discussed with Dr. Lisette Espinal.    [facility] ZamoraOur [facility]  O: [phone]  P: [phone]

## 2021-06-13 NOTE — CONSULTS
"[facility]      Assessment /Plan:    Fluctuating mental status  Concern raised for seizures, long-term EEG recording started  EEG reviewed so far does not show any epileptiform activity  We can increase the Keppra for now  Thanks, we will follow up tomorrow.      Subjective:    Callie Dickey is a 72 yr old man seen in neurologic consultation at [facility].  He was initially admitted on October 21, 2017 with subarachnoid hemorrhage.  He had some clipping done on multiple aneurysms in the left, I believe there is still an unsecured aneurysm on the right.  CT scans have been stable and it looks like the ventriculostomy drain was removed today.  Over the course of the day he has had some fluctuations, at times less responsive, at other times more awake.  Notes do not indicate any convulsive activity seen by staff members.  EEG has now been hooked up.      Objective:      /65  Pulse 69  Temp 98.9  F (37.2  C) (Axillary)   Resp 13  Ht 5' 8\" (1.727 m)  Wt 186 lb 12.8 oz (84.7 kg)  SpO2 96%  BMI 28.4 kg/m2   He is initially asleep, when I touch his hand and call his name he wakes up quite well and speaks fluent [place] in a loud voice, no aphasia, no dysarthria  He did follow some mimed actions for me, but complex coordination testing was not feasible  Cranial nerves II - XII tested and intact, fundi are normal, no nystagmus  Neck is supple, no bruits  There is no focal or generalized weakness in the extremities  Reflexes are normal and symmetric  Tone is normal on both sides  Sensation is normal  Gait testing is deferred for safety        Patient Active Problem List   Diagnosis     SAH (subarachnoid hemorrhage)     Benign essential HTN     Obstructive hydrocephalus     Aneurysm     Brain edema     Aneurysmal subarachnoid hemorrhage     Nontraumatic subarachnoid hemorrhage     Encephalopathy acute     Seizure-like activity     Hypernatremia     Brain compression "       Current Facility-Administered Medications   Medication Dose Route Frequency Provider Last Rate Last Dose     acetaminophen tablet 650 mg (TYLENOL)  650 mg Oral Q4H PRN Jensen Garcia MD   650 mg at 10/28/17 0113    Or     acetaminophen suppository 650 mg (TYLENOL)  650 mg Rectal Q4H PRN Jensen Garcia MD   650 mg at 10/25/17 2011     bisacodyl suppository 10 mg (DULCOLAX)  10 mg Rectal Daily PRN Jensen Garcia MD   10 mg at 10/24/17 1257     dextrose 50 % (D50W) syringe 20-50 mL  20-50 mL Intravenous PRN Valentino Issa DO         glucagon (human recombinant) injection 1 mg  1 mg Subcutaneous PRN Valentino Issa DO         hydrALAZINE injection 20 mg (APRESOLINE)  20 mg Intravenous Q6H PRN Arjun Bourne DO   20 mg at 10/30/17 0700     HYDROmorphone injection 0.2 mg (DILAUDID)  0.2 mg Intravenous Q3H PRN Flori Zamora CNP   0.2 mg at 10/30/17 0610     influenza vacc high dose (age 65 or >) (PF) 2017-18 injection 0.5 mL (FLUZONE HIGH DOSE)  0.5 mL Intramuscular Prior to Discharge Lisette Espinal MD   Stopped at 10/24/17 1100     insulin aspart injection (NovoLOG)   Subcutaneous TID with meals Valentino Issa DO   1 Units at 11/01/17 1824     insulin aspart injection (NovoLOG)   Subcutaneous QHS Valentino Issa DO   1 Units at 10/29/17 2104     labetalol injection 20 mg (NORMODYNE,TRANDATE)  20 mg Intravenous Q1H PRN Flori Zamora CNP   20 mg at 10/31/17 0950     labetalol tablet 100 mg (TRANDATE; NORMODYNE)  100 mg Oral TID Arjun Bourne DO   100 mg at 11/01/17 1426     levETIRAcetam tablet 1,000 mg (KEPPRA)  1,000 mg Oral BID Marty Au MD         lidocaine (PF) 10 mg/mL (1 %) injection 10 mL (XYLOCAINE-MPF)  10 mL Infiltration Once Khalida Swanson CNP         lisinopril tablet 40 mg (PRINIVIL,ZESTRIL)  40 mg Oral DAILY Arjun Bourne DO   40 mg at 11/01/17 0810     magnesium hydroxide suspension 30 mL (MILK OF MAG)  30 mL Oral Daily PRN Jensen Garcia MD         naloxone  injection 0.2-0.4 mg (NARCAN)  0.2-0.4 mg Intravenous PRN Chasity Stanley MD        Or     naloxone injection 0.2-0.4 mg (NARCAN)  0.2-0.4 mg Intramuscular PRN Chasity Stanley MD         niMODipine capsule 60 mg (NIMOTOP)  60 mg Oral Q4H Jensen Garcia MD   60 mg at 11/01/17 1814    Or     niMODipine oral solution 60 mg (NYMALIZE)  60 mg Enteral Tube Q4H Jensen Garcia MD   60 mg at 11/01/17 1426     ondansetron injection 4 mg (ZOFRAN)  4 mg Intravenous Q4H PRN Jensen Garcia MD   4 mg at 10/24/17 1241    Or     ondansetron tablet 8 mg (ZOFRAN)  8 mg Oral Q8H PRN Jensen Garcia MD         pantoprazole EC tablet 40 mg (PROTONIX)  40 mg Oral QAM AC Lita Araya, CNP   40 mg at 11/01/17 0809     polyethylene glycol packet 17 g (MIRALAX)  17 g Oral DAILY Jensen Garcia MD   Stopped at 10/23/17 0900     senna-docusate 8.6-50 mg tablet 1 tablet (PERICOLACE)  1 tablet Oral BID Jensen Garcia MD   Stopped at 10/27/17 2100     sodium chloride 0.9%  25 mL/hr Intravenous Continuous Arjun Bourne DO   Stopped at 10/29/17 2300       History reviewed. No pertinent past medical history.    Social History     Social History     Marital status:      Spouse name: N/A     Number of children: N/A     Years of education: N/A     Occupational History     Not on file.     Social History Main Topics     Smoking status: Not on file     Smokeless tobacco: Not on file     Alcohol use Not on file     Drug use: Not on file     Sexual activity: Not on file     Other Topics Concern     Not on file     Social History Narrative    He is mainly Palauan-speaking.  He does have grown children.       No family history on file.    No Known Allergies    Review of Systems -unable to obtain reliably

## 2021-06-13 NOTE — PROGRESS NOTES
Pulmonary/Critical Care  10/25/2017  9:02 AM     Patient seen on morning interdisciplinary rounds.   Need for ongoing bilateral soft wrist restraints assessed and confirmed.     Angella Robb, [facility] Pulmonary/Critical Care

## 2021-06-13 NOTE — PROGRESS NOTES
NEUROSURGERY POST-OP NOTE:    ASSESSMENT:     Callie Crocker is POD # 11, bleed day #12  status post  LEFT SIDED CRANIOTOMY AND CLIPPING OF MULTIPLE INTRACRANIAL ANEURYSMS, INTRA-OP ANGIOS AND PLACEMENT OF VENTRICULOSTOMY  by Dr. Lisette Espinal.   Complete occlusion of left sided clipped aneurysms. Left MCA aneurysm with clot present, likely to be cause of SAH. Patient still has one unsecured aneurysm on the right.    Periods of waxing and waning alertness.  EEG did not show seizure activity. TCD does not suggest vasospasm.         PLAN:     Patient Active Problem List   Diagnosis     SAH (subarachnoid hemorrhage)     Benign essential HTN     1.   - 160.       Obstructive hydrocephalus     1.  Continue every 2 hour neuro checks overnight.  His alertness waxes and wanes.      2.  HOB elevated.      Aneurysm     Brain edema     Aneurysmal subarachnoid hemorrhage     Nontraumatic subarachnoid hemorrhage     1.  TCD today is normal will not order further.      2.   Monitor post operative left fluid collection, which is better today.      3.   Crani cap and clean gauze to incision.     4.  Patient needs normal glucose for wound healing.      Encephalopathy acute     Seizure-like activity     1.  EEG shows no seizure activity. No further EEG     Hypernatremia     Brain compression      1.  Goal normal sodium at this time.           Discharge Recommendations/Plan:  Barriers to Discharge: yes  Continued acute medical management.     Follow Up Plan: right unsecured aneurysm will need to be addressed after this event. Follow up with IR versus surveillance monitoring.     Plan TBD by Dr. Espinal.          HPI: Callie Crocker is status post LEFT SIDED CRANIOTOMY AND CLIPPING OF MULTIPLE INTRACRANIAL ANEURYSMS, INTRA-OP ANGIOS AND PLACEMENT OF VENTRICULOSTOMY  by Dr. Lisette Espinal. He is 72 year old male presented after sudden onset of lightheadedness, dizziness, nausea and vomiting and syncopal event  witnessed by his son. CT in the ED revealed subarachnoid hemorrhage, CTA 4.6 x 3.4 mm anteriorly directed CONCEPCION aneurysm. 4V revealed multiple aneurysms as detailed below. He was taken to the OR for clipping with Dr. Espinal. All four of the left sided aneurysms were clipped and completely occluded, the right sided aneurysm remains unsecured.    SUBJECTIVE:  Lethargy waxes and wanes. Follows commands.       OBJECTIVE:  Vital signs in last 24 hours  Temp:  [97.5  F (36.4  C)-98.2  F (36.8  C)] 98  F (36.7  C)  Heart Rate:  [58-77] 60  Resp:  [11-29] 11  BP: ()/(50-91) 130/62  Body mass index is 28.4 kg/(m^2).    Mental Status: He is awake, answers more apporpriate    PERRL, following commands with no weakness in any extremity.       Incision: Incision was approximated with staples and covered with dry gauze.  The incision is clean and dry.      Bowel Management:  Prophylaxis yes   Last BM:  Today    Pertinent Labs   Lab Results:   Lab Results   Component Value Date     11/03/2017    K 3.9 11/03/2017     (H) 11/03/2017    CO2 18 (L) 11/03/2017    BUN 19 11/03/2017    CREATININE 0.75 11/03/2017    CALCIUM 9.2 11/03/2017     Lab Results   Component Value Date    WBC 5.2 11/03/2017    HGB 12.0 (L) 11/03/2017    HCT 35.6 (L) 11/03/2017    MCV 91 11/03/2017     11/03/2017       Pertinent Radiology   Radiology Results: Not today    ADDITIONAL COMMENTS:The patient's clinical examination, laboratory data, and plan was discussed with Dr. Lisette Espinal.      Khalida Swanson NP  [facility] Neurosurgery  [phone]

## 2021-06-13 NOTE — CONSULTS
INITIAL PSYCHIATRIC CONSULTATION  This note was created with the help of Dragon dictation system. Grammatical and typing errors are not intentional.                REASON FOR REQUEST: confusion    ASSESSMENT/RECOMMENDATIONS/PLAN :     Encephalopathy, multifactorial due to medical condition, fluctuating.  Cognitive disorder and behavioral changes due to #1.  Mood disorder due to medical condition; multifactorial.  Behavioral dysregulation due to above.    Recommendations:  Behavioral dysregulation in the setting of encephalopathy thus would benefit from PRN Seroquel.   Seroquel 25 mg TID PRN.  Patient is on fairly good dose of Keppra that should help with behavioral management.     MENTAL STATUS EXAMINATION:   Appearance: Stated age, fluctuating levels of consciousness, restless.  Speech: Confused, mumbling.  Thought Process: Disorganized, disoriented and confused..  Thought content: Does not appear to respond to internally generated stimuli, no acute visual or auditory hallucination; .  No manic symptoms, no paranoia no delusional thoughts.  Thought Formation: No loosening of association or flight of ideas.  Judgment: Impaired  Insight : Impaired  Attention : Distracted, fluctuating levels of consciousness  Memory: Impaired, depressed  Fund Of Knowledge: Depressed  Affect: Flat  Mood: Confused disoriented  Alert and Oriented: To self.  Comprehension: Depressed at present  Generative thought content: Reduced  Language: Intact  Gait and Ambulation: With assist of one.  Problem solving: Impaired. Executive functioning : Impaired.          HISTORY OF PRESENT ILLNESS:   Presenting history to include: Per [facility]:   Callie Crocker is a 72 y.o. old male Lao-speaking male that had the sudden onset of lightheadedness, dizziness and nausea with vomiting and a witnessed syncopal event outside of a store where he was buying earbuds.  This was witnessed by family.  He vomited again in route per the  "emergency medical service.  He was incontinent of urine.  He complained of a severe headache.  In the emergency room acute subarachnoid hemorrhage was noted on CT scan.  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  Cerebral angiogram showed multiple intracranial aneurysms (5) and he is now scheduled for open surgical exploration and clipping tomorrow.  He has been admitted to the neuro ICU.    Per neurology:  Impression: Subarachnoid Hemorrhage/Status Post Clipping of Left-Sided Aneurysms.  Patient Appears Similar Clinically at This Time with Moving All Extremities and Following Simple Commands by Mimicry.  Note at Times Has Spit out Medications.  No Seizures.    He was quite sleepy but arousable. He knew who he was but could not tell me where he was. Unaware of hospital surrounding or his restraints. He did not know what year or date it was and how he ended up here.  Staff mentioned that he was little restless, pulling on his tubes and not redirectable, and attempting to get out of bed.  Per nursing:   Pt made no attempts to get out of bed, more alert this shift and watching TV. Did not spit out any of his medications this shift.   Patient remains stable neurologically and hemodynamically.  Patient is able to tell me his name, but unable to answer other questions appropriately.  Patient continues to wax and wanes and has confused conversation with staff.   Restraints removed briefly during assessment and pt immediately reaches for head and incision. Needs constant reminders to leave incision alone. Has stockinette over incision but pushes that off when hands released  Psychiatric ROS:  Patient is not a reliable historian, distracted at times. Difficult to get a comprehensive response to ROS questions due to poor cognition at this moment.                 /68 (Patient Position: Lying)  Pulse 60  Temp 97.9  F (36.6  C) (Oral)   Resp 18  Ht 5' 8\" (1.727 m)  Wt 180 lb 1.6 oz " (81.7 kg)  SpO2 95%  BMI 27.38 kg/m2  No results found for: AMPHET, PCP, BARBIT, COCAINEMETAB, OXYCODONE, CREAUR, THC, ETOH  Recent Results (from the past 24 hour(s))   POCT Glucose   Result Value Ref Range    Glucose,  mg/dL   POCT Glucose   Result Value Ref Range    Glucose,  mg/dL   POCT Glucose   Result Value Ref Range    Glucose,  mg/dL   Basic Metabolic Panel   Result Value Ref Range    Sodium 140 136 - 145 mmol/L    Potassium 4.1 3.5 - 5.0 mmol/L    Chloride 114 (H) 98 - 107 mmol/L    CO2 17 (L) 22 - 31 mmol/L    Anion Gap, Calculation 9 5 - 18 mmol/L    Glucose 121 70 - 125 mg/dL    Calcium 9.4 8.5 - 10.5 mg/dL    BUN 23 8 - 28 mg/dL    Creatinine 0.72 0.70 - 1.30 mg/dL    GFR MDRD Af Amer >60 >60 mL/min/1.73m2    GFR MDRD Non Af Amer >60 >60 mL/min/1.73m2   HM2(CBC W/O DIFF)   Result Value Ref Range    WBC 4.3 4.0 - 11.0 thou/uL    RBC 4.04 (L) 4.40 - 6.20 mill/uL    Hemoglobin 12.5 (L) 14.0 - 18.0 g/dL    Hematocrit 36.7 (L) 40.0 - 54.0 %    MCV 91 80 - 100 fL    MCH 30.9 27.0 - 34.0 pg    MCHC 34.1 32.0 - 36.0 g/dL    RDW 14.8 (H) 11.0 - 14.5 %    Platelets 354 140 - 440 thou/uL    MPV 9.9 8.5 - 12.5 fL   Magnesium   Result Value Ref Range    Magnesium 2.0 1.8 - 2.6 mg/dL   POCT Glucose   Result Value Ref Range    Glucose,  mg/dL     Recent Results (from the past 72 hour(s))   POCT Glucose    Collection Time: 11/04/17  1:02 PM   Result Value Ref Range    Glucose,  mg/dL   POCT Glucose    Collection Time: 11/04/17  5:10 PM   Result Value Ref Range    Glucose,  mg/dL   POCT Glucose    Collection Time: 11/04/17  8:57 PM   Result Value Ref Range    Glucose,  mg/dL   Basic Metabolic Panel    Collection Time: 11/05/17  4:57 AM   Result Value Ref Range    Sodium 137 136 - 145 mmol/L    Potassium 4.0 3.5 - 5.0 mmol/L    Chloride 110 (H) 98 - 107 mmol/L    CO2 17 (L) 22 - 31 mmol/L    Anion Gap, Calculation 10 5 - 18 mmol/L    Glucose 132 (H) 70 - 125 mg/dL     Calcium 9.3 8.5 - 10.5 mg/dL    BUN 21 8 - 28 mg/dL    Creatinine 0.74 0.70 - 1.30 mg/dL    GFR MDRD Af Amer >60 >60 mL/min/1.73m2    GFR MDRD Non Af Amer >60 >60 mL/min/1.73m2   HM2(CBC W/O DIFF)    Collection Time: 11/05/17  4:57 AM   Result Value Ref Range    WBC 5.0 4.0 - 11.0 thou/uL    RBC 4.06 (L) 4.40 - 6.20 mill/uL    Hemoglobin 12.4 (L) 14.0 - 18.0 g/dL    Hematocrit 36.7 (L) 40.0 - 54.0 %    MCV 90 80 - 100 fL    MCH 30.5 27.0 - 34.0 pg    MCHC 33.8 32.0 - 36.0 g/dL    RDW 14.6 (H) 11.0 - 14.5 %    Platelets 436 140 - 440 thou/uL    MPV 10.0 8.5 - 12.5 fL   Magnesium    Collection Time: 11/05/17  4:57 AM   Result Value Ref Range    Magnesium 2.2 1.8 - 2.6 mg/dL   POCT Glucose    Collection Time: 11/05/17  8:07 AM   Result Value Ref Range    Glucose,  mg/dL   POCT Glucose    Collection Time: 11/05/17 11:50 AM   Result Value Ref Range    Glucose,  mg/dL   POCT Glucose    Collection Time: 11/05/17  4:18 PM   Result Value Ref Range    Glucose,  mg/dL   POCT Glucose    Collection Time: 11/05/17  8:53 PM   Result Value Ref Range    Glucose,  mg/dL   Basic Metabolic Panel    Collection Time: 11/06/17  4:37 AM   Result Value Ref Range    Sodium 137 136 - 145 mmol/L    Potassium 4.1 3.5 - 5.0 mmol/L    Chloride 113 (H) 98 - 107 mmol/L    CO2 15 (L) 22 - 31 mmol/L    Anion Gap, Calculation 9 5 - 18 mmol/L    Glucose 130 (H) 70 - 125 mg/dL    Calcium 9.4 8.5 - 10.5 mg/dL    BUN 26 8 - 28 mg/dL    Creatinine 0.84 0.70 - 1.30 mg/dL    GFR MDRD Af Amer >60 >60 mL/min/1.73m2    GFR MDRD Non Af Amer >60 >60 mL/min/1.73m2   HM2(CBC W/O DIFF)    Collection Time: 11/06/17  4:37 AM   Result Value Ref Range    WBC 5.4 4.0 - 11.0 thou/uL    RBC 4.12 (L) 4.40 - 6.20 mill/uL    Hemoglobin 12.8 (L) 14.0 - 18.0 g/dL    Hematocrit 37.4 (L) 40.0 - 54.0 %    MCV 91 80 - 100 fL    MCH 31.1 27.0 - 34.0 pg    MCHC 34.2 32.0 - 36.0 g/dL    RDW 15.1 (H) 11.0 - 14.5 %    Platelets 373 140 - 440 thou/uL    MPV  10.2 8.5 - 12.5 fL   Magnesium    Collection Time: 11/06/17  4:37 AM   Result Value Ref Range    Magnesium 2.3 1.8 - 2.6 mg/dL   POCT Glucose    Collection Time: 11/06/17  8:14 AM   Result Value Ref Range    Glucose,  mg/dL   POCT Glucose    Collection Time: 11/06/17 11:41 AM   Result Value Ref Range    Glucose,  mg/dL   POCT Glucose    Collection Time: 11/06/17  5:17 PM   Result Value Ref Range    Glucose,  mg/dL   POCT Glucose    Collection Time: 11/06/17  8:34 PM   Result Value Ref Range    Glucose,  mg/dL   Basic Metabolic Panel    Collection Time: 11/07/17  4:50 AM   Result Value Ref Range    Sodium 140 136 - 145 mmol/L    Potassium 4.1 3.5 - 5.0 mmol/L    Chloride 114 (H) 98 - 107 mmol/L    CO2 17 (L) 22 - 31 mmol/L    Anion Gap, Calculation 9 5 - 18 mmol/L    Glucose 121 70 - 125 mg/dL    Calcium 9.4 8.5 - 10.5 mg/dL    BUN 23 8 - 28 mg/dL    Creatinine 0.72 0.70 - 1.30 mg/dL    GFR MDRD Af Amer >60 >60 mL/min/1.73m2    GFR MDRD Non Af Amer >60 >60 mL/min/1.73m2   HM2(CBC W/O DIFF)    Collection Time: 11/07/17  4:50 AM   Result Value Ref Range    WBC 4.3 4.0 - 11.0 thou/uL    RBC 4.04 (L) 4.40 - 6.20 mill/uL    Hemoglobin 12.5 (L) 14.0 - 18.0 g/dL    Hematocrit 36.7 (L) 40.0 - 54.0 %    MCV 91 80 - 100 fL    MCH 30.9 27.0 - 34.0 pg    MCHC 34.1 32.0 - 36.0 g/dL    RDW 14.8 (H) 11.0 - 14.5 %    Platelets 354 140 - 440 thou/uL    MPV 9.9 8.5 - 12.5 fL   Magnesium    Collection Time: 11/07/17  4:50 AM   Result Value Ref Range    Magnesium 2.0 1.8 - 2.6 mg/dL   POCT Glucose    Collection Time: 11/07/17  8:13 AM   Result Value Ref Range    Glucose,  mg/dL       PMH: History reviewed. No pertinent past medical history.        Current Medications:Scheduled Meds:    influenza vacc high dose (age 65 or >) (PF) 2017-18  0.5 mL Intramuscular Prior to Discharge     insulin aspart (NovoLOG) injection   Subcutaneous TID with meals     insulin aspart (NovoLOG) injection   Subcutaneous QHS      labetalol  100 mg Oral TID     levETIRAcetam  1,000 mg Oral BID     lidocaine (XYLOCAINE) 1 % local injection  10 mL Infiltration Once     lisinopril  20 mg Oral BID     niMODipine  30 mg Oral Q4H    Or     niMODipine  30 mg Enteral Tube Q4H     pantoprazole  40 mg Oral QAM AC     polyethylene glycol  17 g Oral DAILY     QUEtiapine  12.5 mg Oral QHS     senna-docusate  1 tablet Oral BID     Continuous Infusions:    nacl 0.9% Stopped (10/29/17 2300)     PRN Meds:.acetaminophen **OR** acetaminophen, bisacodyl, dextrose 50 % (D50W), glucagon (human recombinant), hydrALAZINE, HYDROmorphone, labetalol, magnesium hydroxide, naloxone **OR** naloxone, ondansetron **OR** ondansetron                Family History: PERSONALLY REVIEWED.No family history on file.  Pertinent Family hx not pertinent to Chief Complaint or reason for visit.     Social History:  PERSONALLY REVIEWED.  Social History     Social History     Marital status:      Spouse name: N/A     Number of children: N/A     Years of education: N/A     Occupational History     Not on file.     Social History Main Topics     Smoking status: Unknown If Ever Smoked     Smokeless tobacco: Never Used     Alcohol use No     Drug use: No     Sexual activity: Not Currently     Other Topics Concern     Not on file     Social History Narrative    He is mainly Norwegian-speaking.  He does have grown children.             Allergies as of 06/01/2014 Reviewed     Review of Systems:As per HPI. Remainders of 12 point review of systems negative.    Review of Pertinent Laboratory:      PERSONALLY REVIEWED.    Physical Exam: Temp:  [96.5  F (35.8  C)-98.2  F (36.8  C)] 97.9  F (36.6  C)  Heart Rate:  [60-70] 60  Resp:  [17-20] 18  BP: ()/(55-71) 137/68   Vitals: reviewed in chart     Physical exam as per medical team: reviewed in chart      diagnoses, risk and benefits of medications discussed with staff. Care coordination with care management team.   Thank you for this  consultation.       Peace Brown; NP  [facility]        This note was created with the help of Dragon dictation system. Grammatical and typing errors are not intentional.

## 2021-06-13 NOTE — PROGRESS NOTES
NEUROLOGY PROGRESS NOTE     ASSESSMENT & PLAN   Hospital Day#: 4    SAH S/P clipping left MCA, left anterior choroidal, left carotid terminus and left CONCEPCION aneurysm and ventriculostomy placement    Repeat CT scan shows changes due to craniotomy with bifrontal pneumocephalus.  Subarachnoid hemorrhage has decreased but increased intraventricular hemorrhage.    Subarachnoid hemorrhage protocol/management per neurosurgery    Patient on nimodipine 60 mg every 4 hours time 21 days    Keppra 500 mg twice a day.  Patient will need Keppra at least for 1 month    Transcranial Dopplers do not suggest vasospasm    Nothing more to add, I will sign off.  Please call if any question    Neurology Discharge Planning :  TBD    Patient Active Problem List    Diagnosis Date Noted     Nontraumatic subarachnoid hemorrhage 10/24/2017     Encephalopathy acute 10/24/2017     Brain edema 10/23/2017     Aneurysmal subarachnoid hemorrhage 10/23/2017     Aneurysm 10/22/2017     SAH (subarachnoid hemorrhage) 10/21/2017     Benign essential HTN      Hydrocephalus      Past Medical History: Patient  has no past medical history on file.     SUBJECTIVE     No significant change in neuro status     OBJECTIVE     Vital signs in last 24 hours  Temp:  [98.2  F (36.8  C)-100.9  F (38.3  C)] 98.2  F (36.8  C)  Heart Rate:  [] 93  Resp:  [14-36] 16  BP: (100-172)/(51-72) 131/63  Arterial Line BP: ()/() 123/59    Weight:   208 lb 12.4 oz (94.7 kg)    I/O last 24 hours    Intake/Output Summary (Last 24 hours) at 10/25/17 0709  Last data filed at 10/25/17 0517   Gross per 24 hour   Intake             1920 ml   Output             2516 ml   Net             -596 ml       Review of Systems   Pertinent items are noted in HPI.    General Physical Exam: Patient is alert and oriented x 1. Vital signs were reviewed and are documented in EMR. Neck was supple, no Carotid bruit, thyromegaly, JVD or lymphadenopathy noted.  Neurological Exam:  Patient is  alert and oriented follows commands intermittently at times by mimicking.  Pupil equal round and reactive face symmetrical patient has a right pronator drift left side seems normal.  Reflexes 2+ with equivocal toes.  No dysmetria noted on finger-nose testing     DIAGNOSTIC STUDIES     Pertinent Radiology   Radiology Results: Personally reviewed image/s and Personally reviewed impression/s    CT  10/24/17  1.  Interval left-sided craniotomy with placement of three aneurysm clips.  2.  Fluid collection subjacent to the craniotomy and bifrontal pneumocephalus.  3.  Interval decreased subarachnoid hemorrhage and increased intraventricular hemorrhage.  4.  Left frontal approach ventriculostomy with decompressed ventricular system.  5.  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  6.  Mild mass effect with mild rightward shift of midline structures by 4 mm.  10/23/17  1.  Patient status post a left frontal to left temporal craniotomy for clipping of multiple cerebral aneurysms as stated above.  2.  There is pneumocephalus seen in the frontal regions anteriorly and also free air and free fluid is noted within the subgaleal region of the scalp in the left frontal region. There is a small extra-axial collection noted.  3.  There is an interval decrease in the subarachnoid hemorrhage seen previously.  4.  The ventricular system size has decreased mildly in the interval. There is now intraventricular hemorrhage seen in the dependent portions of the lateral ventricles. There is a left frontal ventricular catheter in the midportion of the right lateral   ventricle. There is a slight midline shift to the right side anteriorly x 4 mm.    5.  There is now well-defined low-attenuation consistent within subacute infarct involving the inferior left frontal lobe extending up to the left basal ganglia primarily involving the left caudate head and anterior left putamen.    Cerebral Angiogram  Intraoperative left  internal carotid artery angiography documenting successful surgical clipping of 4 intracranial aneurysms; anterior communicating aneurysm, left internal carotid artery terminus aneurysm, left anterior choroidal artery aneurysm, and   left middle cerebral artery bifurcation aneurysm. No parent artery compromise or other angiographic complication is suggested.  10/21/17  4.6 x 5 x 6.1 mm anteriorly projecting mildly irregular saccular aneurysm of the anterior communicating artery at the left anterior cerebral artery A1/A2 segment junction with a relatively narrow 2.8 mm neck.    3.6 x 3.4 x 3.3 mm inferiorly projecting bilobed saccular aneurysm of the left anterior choroidal artery origin with a 2.1 mm neck, incorporating the anterior choroidal artery at its medial margin.    2.6 x 1.1 x 1.6 mm superiorly projecting broad-based aneurysm of the left carotid terminus.    2.1 x 1.7 x 2.4 mm laterally projecting saccular aneurysm of the left middle cerebral artery bifurcation with a broad 2 mm neck.    1.5 x 1.7 x 1.6 mm inferiorly projecting aneurysm of the right anterior choroidal artery origin with a broad 1.7 mm neck, incorporating the anterior choroidal artery at its anterior margin.    Results were discussed with the patient's family and neurosurgery team immediately following the procedure. Due to the multiplicity of left anterior circulation aneurysms and subarachnoid hemorrhage filling both the suprasellar cistern and the left   sylvian fissure asymmetrically, reliable determination of a single aneurysm for endovascular treatment as the hemorrhage source is limited. After discussion with the neurosurgical team, the decision was made to proceed with open surgical clipping in the   morning.    Evaluation and stenosis determination based on NASCET criteria.  Pertinent Labs   Lab Results: personally reviewed.   Recent Results (from the past 24 hour(s))   POCT Glucose    Collection Time: 10/24/17  8:43 AM   Result  Value Ref Range    Glucose,  mg/dL   POCT Glucose    Collection Time: 10/24/17 11:34 AM   Result Value Ref Range    Glucose,  mg/dL   HM2(CBC W/O DIFF)    Collection Time: 10/24/17 12:18 PM   Result Value Ref Range    WBC 14.3 (H) 4.0 - 11.0 thou/uL    RBC 3.51 (L) 4.40 - 6.20 mill/uL    Hemoglobin 10.7 (L) 14.0 - 18.0 g/dL    Hematocrit 31.1 (L) 40.0 - 54.0 %    MCV 89 80 - 100 fL    MCH 30.5 27.0 - 34.0 pg    MCHC 34.4 32.0 - 36.0 g/dL    RDW 14.4 11.0 - 14.5 %    Platelets 179 140 - 440 thou/uL    MPV 10.6 8.5 - 12.5 fL   Procalcitonin    Collection Time: 10/24/17 12:18 PM   Result Value Ref Range    Procalcitonin 0.53 (H) 0.00 - 0.49 ng/mL   Lactic Acid    Collection Time: 10/24/17 12:18 PM   Result Value Ref Range    Lactic Acid 0.9 0.5 - 2.2 mmol/L   POCT Glucose    Collection Time: 10/24/17  5:14 PM   Result Value Ref Range    Glucose,  mg/dL   POCT Glucose    Collection Time: 10/24/17  8:34 PM   Result Value Ref Range    Glucose,  mg/dL   Sodium    Collection Time: 10/25/17  3:57 AM   Result Value Ref Range    Sodium 147 (H) 136 - 145 mmol/L   Magnesium    Collection Time: 10/25/17  3:57 AM   Result Value Ref Range    Magnesium 2.3 1.8 - 2.6 mg/dL   Potassium - Next AM    Collection Time: 10/25/17  3:57 AM   Result Value Ref Range    Potassium 4.0 3.5 - 5.0 mmol/L   POCT Glucose    Collection Time: 10/25/17  4:14 AM   Result Value Ref Range    Glucose,  mg/dL         HOSPITAL MEDICATIONS       haloperidol lactate  1 mg Intravenous Once     influenza vacc high dose (age 65 or >) (PF) 2017-18  0.5 mL Intramuscular Prior to Discharge     insulin aspart (NovoLOG) injection   Subcutaneous TID with meals     insulin aspart (NovoLOG) injection   Subcutaneous QHS     levETIRAcetam (KEPPRA) IVPB  500 mg Intravenous Q12H     lisinopril  20 mg Oral DAILY     niMODipine  60 mg Oral Q4H    Or     niMODipine  60 mg Enteral Tube Q4H     pantoprazole  40 mg Intravenous Q24H      polyethylene glycol  17 g Oral DAILY     senna-docusate  1 tablet Oral BID        Total time spent for face to face visit, reviewing labs/imaging studies, counseling and coordination of care was: 30 Minutes More than 50% of this time was spent on counseling and coordination of care.    Cal Mcduffie MD  [facility].  Direct Secure Messaging: [email or web address]  Tel: [phone]    This note was dictated using voice recognition software.  Any grammatical or context distortions are unintentional and inherent to the software.

## 2021-06-13 NOTE — BRIEF OP NOTE
Brief Operative Note    Name:  Callie Crocker  Location: [facility] Main OR  Procedure Date:  10/22/2017  PCP:  No Primary Care Provider    Procedure:  1. Left sided craniotomy and clipping of left middle cerebral aneurysm, complex(due to incorporation of inflow and outflow vessels In the aneurysm)  2. Clipping of left anterior choroidal aneurysm, complex(due to incorporation of anterior choroidal artery in the aneurysm)  3. Clipping of left carotid terminus aneurysm, complex, due to incorporation of cartid artery and anterior cerebral artery into the aneurysm  4. Clipping of left anterior communicating artery aneurysm, complex, due to incorporation of anterior cerebral arteries, A1 and A2's, into the aneurysm  5. Placement of left frontal Pain's point ventriculostomy drain  6. Use of intraoperative microscope  7. Introperative angiogram  All of the above under the same anesthesia    LEFT SIDED CRANIOTOMY AND CLIPPING OF MULTIPLE INTRACRANIAL ANEURYSMS, INTRA-OP ANGIOS AND PLACEMENT OF VENTRICULOSTOMY (Left)    Pre-Procedure Diagnosis:    Aneurysm [I72.9]     Post-Procedure Diagnosis:    * No post-op diagnosis entered *    Surgeon(s):  Lisette Espinal MD    Findings:    Blood clot around left MCA aneurysm, suggestive of this being the cause of the SAH  Intraoperative angiogram confirming complete occlusion of all 4 left anterior circulation aneurysms and patency of normal inflow and outflow vessels    Estimated Blood Loss:   150 mL from 10/22/2017  8:08 AM to 10/22/2017  1:31 PM    Specimens:    * No specimens in log *       Drains:   Urethral Catheter Non-latex 16 Fr. (Active)   Site Skin Assessment Clean;Intact 10/22/2017  2:00 AM   Care/Interventions Patent and draining;Standard catheter cares;Periurethral area inspected for signs of inflammation and infection every shift;Closed drainage system maintained 10/22/2017  2:00 AM   Securement Method Stabilization device 10/22/2017  2:00 AM   Protocol 1:  Patient excluded from protocol? Yes, Specific provider order to keep catheter in place 10/22/2017  2:00 AM   Protocol 2: Indication to continue catheter Output monitoring in critical patient 10/22/2017  2:00 AM   Output (mL) 430 mL 10/22/2017  6:30 AM   Drainage Color Zhane 10/22/2017  6:30 AM       ICP/Ventriculostomy Left Parietal region (Active)       Implants:    Implant Name Type Inv. Item Serial No.  Lot No. LRB No. Used Action   CLIP LIGATION Mass Vector MED SINGLE    2200 - 002200 - [id number] STAPLING CLIP LIGATION Mass Vector MED SINGLE    2200 - 002200  TELEFLEX [id number] Left 1 Implanted   CLIP ANRYSM YSRGIL MINI PERM STR 7MM - [id number] - S  98-OTHER IMPLANTS CLIP ANRYSM YSRGIL MINI PERM STR 7MM - [id number]  AESCULAP  Left 2 Implanted   Aesculap Aneurysm Clip    AESCULAP  Left 1 Implanted   Aesculap Aneurysm clip    AESCULAP  Left 1 Implanted   ALLOGRAFT DURAGEN PLUS 3X3  (5EA [id number]) - [id number] - [id number] 62-SPINAL BARRIER ALLOGRAFT DURAGEN PLUS 3X3  (5EA [id number]) - [id number]  INTE[employer]RA [id number] Left 1 Implanted   COVER MARCOS HOLE 3MM 17MM 1.5MM - -47-09 - S  72-BONE PLATE SMALL COVER MARCOS HOLE 3MM 17MM 1.5MM - -94-09  MICHAEL CODIE  Left 2 Implanted   PLATE NEURO LP [id number] SQ .6MM 1.5MM - -90-09 - S  72-BONE PLATE SMALL PLATE NEURO LP [id number] SQ .6MM 1.5MM - -84-09  MICHAEL MACKAY  Left 1 Implanted   SCREW NEURO MAXDRIVE 1.5MM X 4MM - -67-91 - S  82-CORTICAL SCREW SCREW NEURO MAXDRIVE 1.5MM X 4MM - -01-91  MICHAEL MACKAY  Left 13 Implanted       Complications:    None    Lisette Espinal     Date: 10/22/2017  Time: 1:31 PM

## 2021-06-14 NOTE — PROGRESS NOTES
Hospitalist Progress Note        Assessment and Plan  Principal Problem:    SAH (subarachnoid hemorrhage)  Active Problems:    Benign essential HTN    Obstructive hydrocephalus    Brain edema    Aneurysmal subarachnoid hemorrhage    Nontraumatic subarachnoid hemorrhage    Encephalopathy acute    Seizure-like activity    Hypernatremia    Brain compression    Severe malnutrition    Mood disorder due to a general medical condition    Cognitive and behavioral changes      SAH S/P Left craniotomy with clipping  -  POD 22  -  Pain control  -  Post-operative management per [facility] service  -  Levetiracetam for seizure risk reduction  -  Continue nimodipine to reduce risk of vasospasm  -  BP control    Cognitive impairment  -  Likely secondary to SAH  -  Continue quetiapine    Essential hypertension  -  BP controlled  -  Continue labetalol  -  Continue lisinopril  -  Monitoring BP    VTE risk reduction.  Ambulation.    Discharge.  Possible discharge home on 11/14.          Brief Summary  72-year-old gentleman with a medical history significant for hypertension who incurred subarachnoid hemorrhage.  He underwent Left craniotomy with placement of multiple cerebrovascular clips on 10/22/2017.    Subjective  Did not report headache.    Physical Examination    Vital signs in last 24 hours  Temp:  [97.3  F (36.3  C)-98.2  F (36.8  C)] 97.6  F (36.4  C)  Heart Rate:  [62-78] 68  Resp:  [18] 18  BP: (106-124)/(56-83) 112/60 96% O2 Device: None (Room air) O2 Flow Rate (L/min): 0 L/min  Weight:   178 lb 4.8 oz (80.9 kg) Weight change:     General: Awake, comfortable, NAD.   HEENT: Sclera white. No redness or jaundice.   Cardiac: RRR, no abnormal heart sounds   Pulmonary: CTA abdiaziz   Abdomen: Nondistended.   Musculoskeletal: No joint abnormalities noted.  Skin: Scalp surgical incision shows normal healing.  Neurologic: Awake, alert, interactive.  Psychiatric: Calm    Intake/Output last 3 shifts  I/O last 3 completed shifts:  In: 460  [P.O.:460]  Out: -   Body mass index is 27.11 kg/(m^2).        Pertinent Labs   Lab Results: personally reviewed.     Results from last 7 days  Lab Units 11/13/17  0436 11/12/17  0426 11/11/17  0451 11/10/17  0440 11/09/17  0415 11/08/17  0458 11/07/17  0450   LN-SODIUM mmol/L  --  142 142 139 139 140 140   LN-POTASSIUM mmol/L 4.3 4.2 4.3 3.9 4.4 4.1 4.1   LN-CHLORIDE mmol/L  --  113* 114* 110* 109* 111* 114*   LN-CO2 mmol/L  --  20* 17* 22 19* 20* 17*   LN-BLOOD UREA NITROGEN mg/dL  --  24 21 16 16 17 23   LN-CREATININE mg/dL  --  0.81 0.92 0.75 0.73 0.76 0.72   LN-CALCIUM mg/dL  --  9.6 9.6 9.8 9.7 9.7 9.4   LN-MAGNESIUM mg/dL  --   --   --   --  2.0 1.9 2.0       Results from last 7 days  Lab Units 11/10/17  0440 11/09/17 0415 11/08/17  0458   LN-WHITE BLOOD CELL COUNT thou/uL 4.3 5.3 3.8*   LN-HEMOGLOBIN g/dL 12.7* 13.1* 12.7*   LN-HEMATOCRIT % 36.5* 38.1* 37.7*   LN-PLATELET COUNT thou/uL 321 333 336           Results from last 7 days  Lab Units 11/13/17  1307 11/12/17  2006 11/12/17  1636 11/12/17  1208 11/12/17  0757 11/11/17  2109 11/11/17  1619 11/11/17  1157 11/11/17  0819 11/10/17  2057   LN-POC GLUCOSE FINGERSTICK- HE mg/dL 130 180 148 152 135 143 142 166 135 134         Pertinent Radiology   Radiology Results: Personally reviewed impression/s     [facility]  CT HEAD WO CONTRAST  11/1/2017 5:44 AM     INDICATION: Intracranial hemorrhage, hydrocephalus, ventric clamped since 10/30/2017  TECHNIQUE: Without IV contrast. Dose reduction techniques were used.  CONTRAST: None  COMPARISON:  10/31/2017     FINDINGS: Redemonstration of left craniotomy and aneurysm clipping. Mild ischemic change left frontal lobe and basal ganglia, stable. Stable elliptical collection deep to the craniectomy site. Stable left to right midline shift. Stable left frontal shunt   tube. Stable ventricular size stable small amount of intraventricular hemorrhage. Stable small amount of bilateral subarachnoid hemorrhage.  Normal orbits. Clear paranasal sinuses and mastoid air cells.     IMPRESSION:   CONCLUSION:  No significant change.            EKG Results: personally reviewed            Juan A Vizcaino MD, MS  Internal Medicine Hospitalist  11/13/2017

## 2021-06-14 NOTE — PROGRESS NOTES
DURATION OF KEPPRA-    Spoke with Cal Connors, neurology - he would like keppra to continue for total of 3 months - end date 1/20/18  Dasha Gifford, [facility], BCPS

## 2021-06-14 NOTE — PROGRESS NOTES
"  Clinical Nutrition Therapy Follow Up Note    Subjective:  Patient sleeping.  Chart reviewed.    Nutrition HX: He normally eats beans and dairy as his protein sources, but is receiving meat in the hospital per his daughter's suggestions.  His dentures are loose-fitting and he needs denture adhesive.        Current Nutrition Prescription:   Diet: diabetic  Supplements:chocolate Boost GC BID, yogurt bid with meals.    Current Nutrition Intake:  Eating % at meals.  Meal trays are generated.    Intake increased since last week.    Anthropometrics:  Height: 5' 8\" (172.7 cm)  Admission weight:201#  Weight: 178 lb 4.8 oz (80.9 kg) 11/10    GI Status/Output:   LBM 11/11.    Skin/Wound:  Harry Scale Score: 18    Labs:  Labs reviewed.  FSBG 135-180.    Malnutrition: Noted previously 11/6.    Nutrition Risk Level: moderate risk    Nutrition dx:  severe protein calorie malnutrition in the context of acute illness d/t confusion and food preferences as evidenced by 7% unintentional weight loss since admission and <50% estimated energy needs for >5 days    Goal:  Avoid wt loss in excess of 2# per week-not met    Intervention:  Continue diabetic diet, Boost GC.   Encourage food/fluid intake.   Pt is not appropriate for diabetic diet ed d/t cognition.      Monitoring:  Weight, po intake    See Care Plan for Problems, Goals, and Interventions.  "

## 2021-06-14 NOTE — PROGRESS NOTES
Hospitalist Progress Note      Date of Service: 11/12/2017     Brief summary:   Callie Olivier a 72 y.o.male with past medical history of hypertension who presented 10/21/17 after a witnessed syncopal event. He had sudden onset dizziness, light headedness, nausea, and vomiting. He was taken to ER, CT scan showed acute subarachnoid hemorrhage centered within the basal cisterns; CTA revealed 4.6 x 3.4 mm anteriorly directed CONCEPCION aneurysm. Cerebral angiogram revealed multiple intracranial aneurysms. He underwent emergent left sided crani and clipping of left MCA, left anterior choroidal, left carotid terminus, left CONCEPCION; and placement of ventriculostomy by Dr. RENO Espinal. There remains one unsecured aneurysm on the right. Neurosurgery removed drain 11/1/17. Due to concern about fluctuating mental status and possible seizures continuous EEG performed by Neuro and Keppra dose increased. No clear evidence of seizures. No vasospasm on TCD.     Discharge has been complicated due to persistent encephalopathy and placement issue.    Assessment and Plan     Principal Problem:    SAH (subarachnoid hemorrhage)  Active Problems:    Benign essential HTN    Obstructive hydrocephalus    Brain edema    Aneurysmal subarachnoid hemorrhage    Nontraumatic subarachnoid hemorrhage    Encephalopathy acute    Seizure-like activity    Hypernatremia    Brain compression    Severe malnutrition    Mood disorder due to a general medical condition    Cognitive and behavioral changes    Principal Problem:  SAH (subarachnoid hemorrhage) POD#21 PBD #22 - secondary to ruptured cerebral aneurysm s/p crani with multiple clips    Suture removal on 11/15, recommend to follow up in 2 weeks with CT head.  Target -160  Post op course significant for persistent/flucutating encephalopathy, social issues    Obstructive Hydrocephalus - External ventricular drain removed 11/1/17 by Neurosurgery      Fluctuating mental status/Acute encephalopathy:  "  Very slow to improve. Still confused and impulsive and attempts to scratch his scalp wound but is redirectable  No clear seizure on EEG and no vasospasm on TCDs.  Keppra  Increased seroquel 25mg bedtime yesterday. This will take quite awhile to improve.    Has been off restraints for 2 days now, Continue to monitor off restraints    Active Problems:  Fevers on admission (resolved now) - possibly central.  Urine Cx negative. Procalcitonin negative and CXR without infiltrates to suggest pneumonia.  Completed 5 days of empiric Zosyn.      Benign essential HTN target normal blood pressure. BP has been stable. Continue nimodipine,labetalol and lisinopril      Brain edema - sodium level has been well maintained, will stop daily BMP. Check intermittently.      Hyperglycemia - BS has been stable for quite some time, will simplify and check BS once daily    Subjective:     Interval History:   Pt seen in presence of interpretor. Still confused and impulsive at times. Needs assistance with feeding but ambulating ok. Denies any pain. Currently calm and following commands.  D/w RN..    Chart reviewed, events noted. Pt seen and examined.     Objective     Vital signs in last 24 hours:  Temp:  [97.6  F (36.4  C)-98.5  F (36.9  C)] 97.6  F (36.4  C)  Heart Rate:  [65-86] 65  Resp:  [15-18] 18  BP: (101-120)/(56-64) 108/57    /57 (Patient Position: Lying)  Pulse 65  Temp 97.6  F (36.4  C) (Oral)   Resp 18  Ht 5' 8\" (1.727 m)  Wt 178 lb 4.8 oz (80.9 kg)  SpO2 98%  BMI 27.11 kg/m2    Weight:    178 lb 4.8 oz (80.9 kg)  Weight change:   Body mass index is 27.11 kg/(m^2).  BS reviewed     Intake/Output last 3 shifts:   I/O last 3 completed shifts:  In: 220 [P.O.:220]  Out: -   Intake/Output this shift:       O2 Device: None (Room air) O2 Flow Rate (L/min): 0 L/min    Physical Exam:       General- awake, confused, comfortable    HEENT- Atraumatic, surgical site weal healing scar with crust    Chest- B/L air entry, no " wheeze, no crackles, not in distress      CVS- S1S2 regular    Abd- Soft, non tender, non distended    CNS- awake, confused, not agitated, oriented to self, fluent speech, follows commands, no focal deficits    Ext- B/L no pedal edema      Imaging:  personally reviewed.      Lab Results:  personally reviewed.   Lab Results   Component Value Date     11/12/2017    K 4.2 11/12/2017     (H) 11/12/2017    CO2 20 (L) 11/12/2017    BUN 24 11/12/2017    CREATININE 0.81 11/12/2017    CALCIUM 9.6 11/12/2017     Lab Results   Component Value Date    WBC 4.3 11/10/2017    HGB 12.7 (L) 11/10/2017    HCT 36.5 (L) 11/10/2017    MCV 90 11/10/2017     11/10/2017     Recent Results (from the past 24 hour(s))   POCT Glucose    Collection Time: 11/11/17 11:57 AM   Result Value Ref Range    Glucose,  mg/dL   POCT Glucose    Collection Time: 11/11/17  4:19 PM   Result Value Ref Range    Glucose,  mg/dL   POCT Glucose    Collection Time: 11/11/17  9:09 PM   Result Value Ref Range    Glucose,  mg/dL   Basic Metabolic Panel    Collection Time: 11/12/17  4:26 AM   Result Value Ref Range    Sodium 142 136 - 145 mmol/L    Potassium 4.2 3.5 - 5.0 mmol/L    Chloride 113 (H) 98 - 107 mmol/L    CO2 20 (L) 22 - 31 mmol/L    Anion Gap, Calculation 9 5 - 18 mmol/L    Glucose 109 70 - 125 mg/dL    Calcium 9.6 8.5 - 10.5 mg/dL    BUN 24 8 - 28 mg/dL    Creatinine 0.81 0.70 - 1.30 mg/dL    GFR MDRD Af Amer >60 >60 mL/min/1.73m2    GFR MDRD Non Af Amer >60 >60 mL/min/1.73m2     Advanced Care Planning:     Barriers to discharge: Persistent encephalopathy, placement issues  Anticipated discharge day:   Unknown  Discussed with RNCLARK  Disposition: Acute rehab vs tCU vs LTACH    Ranjit Humphrey MD  [facility]  Hospitalist  Pager [phone]

## 2021-06-14 NOTE — PROGRESS NOTES
Referral Specialist Note: After futher review, this patient has fallen off criteria for [facility]. Patient was liberated from restraints , no longer requiring IV antibiotics and on regular diet. Thank you for the opportunity to evaluate.     Job Mcfadden RT  Referral Specialist

## 2021-06-14 NOTE — DISCHARGE SUMMARY
[facility] MEDICINE  DISCHARGE SUMMARY     Primary Care Physician: No Primary Care Provider  Admission Date: 10/21/2017   Discharge Provider: Juan A Vizcaino Discharge Date: 11/15/2017   Diet: cardiac diet   Code Status: Full Code   Activity: activity as tolerated        Condition at Discharge: Stable      REASON FOR PRESENTATION (See Admission Note for Details)     Lightheadedness, dizziness, nausea and vomiting    PRINCIPAL & ACTIVE DISCHARGE DIAGNOSES     Principal Problem:    SAH (subarachnoid hemorrhage)  Active Problems:    Benign essential HTN    Obstructive hydrocephalus    Brain edema    Aneurysmal subarachnoid hemorrhage    Nontraumatic subarachnoid hemorrhage    Encephalopathy acute    Seizure-like activity    Hypernatremia    Brain compression    Severe malnutrition    Mood disorder due to a general medical condition    Cognitive and behavioral changes      SIGNIFICANT FINDINGS (Imaging, labs)     [facility]  CT HEAD WO CONTRAST  11/1/2017 5:44 AM      INDICATION: Intracranial hemorrhage, hydrocephalus, ventric clamped since 10/30/2017  TECHNIQUE: Without IV contrast. Dose reduction techniques were used.  CONTRAST: None  COMPARISON:  10/31/2017      FINDINGS: Redemonstration of left craniotomy and aneurysm clipping. Mild ischemic change left frontal lobe and basal ganglia, stable. Stable elliptical collection deep to the craniectomy site. Stable left to right midline shift. Stable left frontal shunt   tube. Stable ventricular size stable small amount of intraventricular hemorrhage. Stable small amount of bilateral subarachnoid hemorrhage. Normal orbits. Clear paranasal sinuses and mastoid air cells.      IMPRESSION:   CONCLUSION:  No significant change.      PENDING LABS     None    PROCEDURES (this hospitalization only)      LEFT SIDED CRANIOTOMY AND CLIPPING OF MULTIPLE INTRACRANIAL ANEURYSMS, INTRA-OP ANGIOS AND PLACEMENT OF VENTRICULOSTOMY    RECOMMENDATION FOR F/U VISIT      PCP  Neurosurgeon  Neurologist  Psychiatrist    DISPOSITION     Home with family support    SUMMARY OF HOSPITAL COURSE     Callie Crocker is a 72-year-old gentleman with a medical history significant for hypertension who incurred subarachnoid hemorrhage.  He was brought to the Hospital after being observed collapsing outside a store.  He presented with lightheadedness, dizziness, nausea and vomiting.  He underwent Left craniotomy with placement of multiple cerebrovascular clips on 10/22/2017.  His recovery has been slow.  Continues to have some cognitive difficulty including challenges with impulse control.  In spite of extensive referrals, patient was unable to be placed in a rehab facility for financial reasons.  He is discharged home with family support.      Discharge Medications with Med Changes        Medication List      START taking these medications          acetaminophen 325 MG tablet   Dose:  650 mg   Commonly known as:  TYLENOL   650 mg, Oral, Q4H PRN       labetalol 100 MG tablet   Quantity:  60 tablet   Dose:  100 mg   Commonly known as:  TRANDATE; NORMODYNE   100 mg, Oral, BID       levETIRAcetam 1000 MG tablet   Quantity:  60 tablet   Dose:  1000 mg   Commonly known as:  KEPPRA   1,000 mg, Oral, BID       lisinopril 20 MG tablet   Quantity:  60 tablet   Dose:  20 mg   Commonly known as:  PRINIVIL,ZESTRIL   20 mg, Oral, BID       QUEtiapine 25 MG tablet   Quantity:  30 tablet   Dose:  25 mg   Commonly known as:  SEROquel   25 mg, Oral, QHS         CONTINUE taking these medications          doxylamine 25 mg tablet   Dose:  25 mg   Commonly known as:  UNISON   25 mg, Oral, Bedtime PRN                 Rationale for medication changes    Levetiracetam prescribed for seizure risk reduction  Lisinopril and labetalol prescribed for blood pressure control        Consults   Neurosurgery  Neurology  Critical care medicine  Psychiatry      Immunizations given this encounter     Immunizations  Administered for This Admission     Name Date    Influenza high dose, seasonal  Deferred ()              Discharge Orders  Walker, rolling   Order Specific Question Answer Comments   The face to face evaluation was performed on: 11/13/2017    NPI: [id number]      Walker, rolling   Order Specific Question Answer Comments   The face to face evaluation was performed on: 11/15/2017      Call MD:  if symptoms get worse   Order Comments: Call [phone] with the following symptoms:    *Temperature 101(fever) or greater or chills  *Redness, swelling or increased drainage from the wound  *Worsening pain not relieved by the  prescription given  *Worsening headache not relieved by the prescription given  *A headache that gets better or worse when you stand up or lie down  *Seizures  *Persistent nausea and vomiting  *Increased sleepiness or difficulty being awakened  *Change in ability to walk, see, think, or talk  *Worsening or new onset of weakness, or numbness and tingling  *Loss or change in your ability to control bowel or bladder function  * If you did not have a bowel movement before leaving the hospital and your bowels have not moved within 48 hours of your discharge      !!!! IF YOU HAVE A SERIOUS OR THREATENING EMERGENCY, CALL 911 OR COME TO THE EMERGENCY ROOM.  IF YOUR CONDITIONS ALLOWS COME TO THE HOSPITAL WHERE YOUR SURGERY WAS PERFORMED.    QUESTIONS OR CONCERNS:   OUR OFFICE HOURS ARE FROM 9:00 A.M -4:30 P.M. MONDAY-FRIDAY.  AFTER OFFICE HOURS, CALLS ARE ANSWERED BY THE ON-CALL PROVIDER. PLEASE CALL WITH ROUTINE QUESTIONS DURING OFFICE HOURS. THE ON-CALL PROVIDER WILL NOT REFILL PRESCRIPTIONS.    Patient's staples out. Keep covered to prevent patient picking at scabs.     Discharge Follow-Up:  [facility]   Order Comments: 2 weeks with repeat head CT (11/15/2017), additional follow up per Dr. Lisette Espinal, will need surveillance imaging of unsecure aneurysm on the right versus IR coiling     Activity  as tolerated   Order Comments: *No lifting, pushing or pulling greater than 5-10 pound (this is about a gallon of milk).  *No repetitive bending, twisting, or jarring activities  *No overhead work  *No aerobic or strenuous activity  *No activities with increased risk of falls  *You may move about your home as tolerated  *You may walk up and down stairs as tolerated     Activity as tolerated   Order Comments: Rest when tired     Discharge diet - Regular     Call MD:  if symptoms get worse     Call MD for:  redness or swelling     Call MD:  if pain or burning when you urinate     Call MD for:  difficulty breathing, headache or visual disturbances     Call MD for:  temperature greater than 101     Call MD for:  severe uncontrolled pain     Call MD for:  constipation (difficulty having a bowel movement)     Call MD for:  dizziness, persistent nausea or vomiting     Call MD for:  weight gain - more than two pounds in a day or five pounds in a week     Discharge Follow Up - Primary Care Clinic   Order Comments: Please follow up with your primary doctor for ongoing medical care.  Please call for appointment.   Order Specific Question Answer Comments   Follow-up in: 3-5 days      Discharge Follow-Up:  Surgeon [facility]   Order Comments: Please follow up with neurosurgeon for ongoing care following your brain surgery.  Please call for appointment.     WOC Wound Care Left buttock; Twice a day   Standing Status: Future  Standing Exp. Date: 11/09/18   Order Comments: Cleanse with water and dry, apply Dimethicone cream to buttocks and perianal area   Order Specific Question Answer Comments   Site: Left buttock    Frequency Twice a day        Examination     Vital Signs in last 24 hours:   Temp:  [97.5  F (36.4  C)-98.6  F (37  C)] 97.7  F (36.5  C)  Heart Rate:  [68-83] 69  Resp:  [16-20] 16  BP: ()/(52-71) 114/57  SpO2:  [98 %-100 %] 98 %  General: Awake, comfortable, NAD   HEENT: Sclera white. No redness or jaundice.    Cardiac: RRR, no abnormal heart sounds   Pulmonary: CTA abdiaziz   Abdomen: Nondistended.  Nontender to palpation.   Musculoskeletal: No joint abnormalities noted.  Skin: Healing scalp surgical incisions  Neurologic: Awake, alert, appropriately interactive  Psychiatric: Calm            Please see EMR for more detailed significant labs, imaging, consultant notes etc.              Total time spent on discharge: > 35 minutes            Juan A Vizcaino MD   CC:No Primary Care Provider

## 2021-06-14 NOTE — PROGRESS NOTES
Progress Note  Restraint Application    I recognize that restraints are physical and/or chemical interventions intended to restrict a person's movements. Restraints are currently needed to ensure the safety of this patient and/or others. My clinical rationale appears below.    --  Category/Type of Restraint     Non Violent:  Soft limb restraint x2  --  Behavior  Patient pulling at dressing, scratching, risk for infection/bleeding.    --  Root Cause of the Behavior   Post neuro surgery  --  Less-Restrictive Measures that Failed  Non Violent Measures:  Close Observation  --  Response to the Restraint  Pt is redirectable     --  Criteria for Release from the Restraint  Pt is calm and redirectable.

## 2021-06-14 NOTE — PROGRESS NOTES
"Spoke again with the state ([facility]) regarding patients EMA coverage and the issue with it not showing up in the [employer] system which is evidently needed for [place] transfer. Per [employer], they have put a \"high priority\" request on the issue to try to get it resolved, but they state there is nothing else that can be done at this time. Patient does have EMA and a EMA Care Plan Request has been submitted which also cannot be processed until patient's MA shows up in the [employer] system. Raghav, FSW [id number]  "

## 2021-06-14 NOTE — PROGRESS NOTES
Hospitalist Progress Note      Date of Service: 11/10/2017     Brief summary:   Callie Olivier a 72 y.o.male with past medical history of hypertension who presented 10/21/17 after a witnessed syncopal event. He had sudden onset dizziness, light headedness, nausea, and vomiting. He was taken to ER, CT scan showed acute subarachnoid hemorrhage centered within the basal cisterns; CTA revealed 4.6 x 3.4 mm anteriorly directed CONCEPCION aneurysm. Cerebral angiogram revealed multiple intracranial aneurysms. He underwent emergent left sided crani and clipping of left MCA, left anterior choroidal, left carotid terminus, left CONCEPCION; and placement of ventriculostomy by Dr. RENO Espinal. There remains one unsecured aneurysm on the right. Neurosurgery removed drain 11/1/17. Due to concern about fluctuating mental status and possible seizures continuous EEG performed by Neuro and Keppra dose increased. No clear evidence of seizures. No vasospasm on TCD.     Assessment and Plan     Principal Problem:    SAH (subarachnoid hemorrhage)  Active Problems:    Benign essential HTN    Obstructive hydrocephalus    Brain edema    Aneurysmal subarachnoid hemorrhage    Nontraumatic subarachnoid hemorrhage    Encephalopathy acute    Seizure-like activity    Hypernatremia    Brain compression    Severe malnutrition    Mood disorder due to a general medical condition    Cognitive and behavioral changes    Principal Problem:  SAH (subarachnoid hemorrhage) POD#19, PBD #20 - secondary to ruptured cerebral aneurysm s/p crani with multiple clips    Suture removal on 11/15, recommend to follow up in 2 weeks with CT head.  Target -160  Post op course significant for persistent/flucutating encephalopathy, social issues    Obstructive Hydrocephalus - External ventricular drain removed 11/1/17 by Neurosurgery      Fluctuating mental status/Acute encephalopathy:   Very slow to improve. Still confused but calm and redirectable and less impulsive.  No  "clear seizure on EEG and no vasospasm on TCDs.  Keppra  Low dose seroquel. This will take quite awhile to improve.     Will try to observe patient off restraints. ?If this is patient's new baseline.      Active Problems:  Fevers on admission (resolved now) - possibly central.  Urine Cx negative. Procalcitonin negative and CXR without infiltrates to suggest pneumonia.  Completed 5 days of empiric Zosyn.      Benign essential HTN target normal blood pressure. Continue nimodipine,labetalol and lisinopril      Brain edema - keep sodium in normal to upper normal range      Hyperglycemia - BG monitoring qid and coverage with SSI    Subjective:     Interval History: Pt is still confused but mental status appear little improved today. He is following commands although communication is limited by language barrier. Eating with assistance.   D/w RN..    Chart reviewed, events noted. Pt seen and examined.     Objective     Vital signs in last 24 hours:  Temp:  [97.3  F (36.3  C)-97.6  F (36.4  C)] 97.3  F (36.3  C)  Heart Rate:  [66-75] 66  Resp:  [18] 18  BP: ()/(54-71) 107/62    /62  Pulse 66  Temp 97.3  F (36.3  C) (Oral)   Resp 18  Ht 5' 8\" (1.727 m)  Wt 178 lb 4.8 oz (80.9 kg)  SpO2 98%  BMI 27.11 kg/m2    Weight:    178 lb 4.8 oz (80.9 kg)  Weight change: 0 lb (0 kg)  Body mass index is 27.11 kg/(m^2).  BS reviewed     Intake/Output last 3 shifts:   I/O last 3 completed shifts:  In: 360 [P.O.:360]  Out: -   Intake/Output this shift:       O2 Device: None (Room air) O2 Flow Rate (L/min): 0 L/min    Physical Exam:       General- awake, confused, comfortable    HEENT- Atraumatic, surgical site weal healing scar wit crust    Chest- B/L air entry, no wheeze, no crackles, not in distress      CVS- S1S2 regular    Abd- Soft, non tender, non distended    CNS- awake, confused, not agitated, oriented to self, fluent speech, follows commands, no focal deficits    Ext- B/L no pedal edema      Imaging:  personally " reviewed.      Lab Results:  personally reviewed.   Lab Results   Component Value Date     11/10/2017    K 3.9 11/10/2017     (H) 11/10/2017    CO2 22 11/10/2017    BUN 16 11/10/2017    CREATININE 0.75 11/10/2017    CALCIUM 9.8 11/10/2017     Lab Results   Component Value Date    WBC 4.3 11/10/2017    HGB 12.7 (L) 11/10/2017    HCT 36.5 (L) 11/10/2017    MCV 90 11/10/2017     11/10/2017     Recent Results (from the past 24 hour(s))   POCT Glucose    Collection Time: 11/09/17  1:19 PM   Result Value Ref Range    Glucose,  mg/dL   POCT Glucose    Collection Time: 11/09/17  5:35 PM   Result Value Ref Range    Glucose,  mg/dL   POCT Glucose    Collection Time: 11/09/17  9:03 PM   Result Value Ref Range    Glucose,  mg/dL   Basic Metabolic Panel    Collection Time: 11/10/17  4:40 AM   Result Value Ref Range    Sodium 139 136 - 145 mmol/L    Potassium 3.9 3.5 - 5.0 mmol/L    Chloride 110 (H) 98 - 107 mmol/L    CO2 22 22 - 31 mmol/L    Anion Gap, Calculation 7 5 - 18 mmol/L    Glucose 124 70 - 125 mg/dL    Calcium 9.8 8.5 - 10.5 mg/dL    BUN 16 8 - 28 mg/dL    Creatinine 0.75 0.70 - 1.30 mg/dL    GFR MDRD Af Amer >60 >60 mL/min/1.73m2    GFR MDRD Non Af Amer >60 >60 mL/min/1.73m2   HM2(CBC W/O DIFF)    Collection Time: 11/10/17  4:40 AM   Result Value Ref Range    WBC 4.3 4.0 - 11.0 thou/uL    RBC 4.08 (L) 4.40 - 6.20 mill/uL    Hemoglobin 12.7 (L) 14.0 - 18.0 g/dL    Hematocrit 36.5 (L) 40.0 - 54.0 %    MCV 90 80 - 100 fL    MCH 31.1 27.0 - 34.0 pg    MCHC 34.8 32.0 - 36.0 g/dL    RDW 14.6 (H) 11.0 - 14.5 %    Platelets 321 140 - 440 thou/uL    MPV 9.7 8.5 - 12.5 fL   POCT Glucose    Collection Time: 11/10/17  8:31 AM   Result Value Ref Range    Glucose,  mg/dL     Advanced Care Planning:     Barriers to discharge: Persistent encephalopathy placement issues  Anticipated discharge day:   Unknown  Discussed with RN, CM  Disposition: Acute rehab vs LTACH    Ranjit Humphrey,  MD  [facility]  Hospitalist  Pager [phone]

## 2021-06-14 NOTE — PROGRESS NOTES
Hospitalist Progress Note      Date of Service: 11/11/2017     Brief summary:   Callie Olivier a 72 y.o.male with past medical history of hypertension who presented 10/21/17 after a witnessed syncopal event. He had sudden onset dizziness, light headedness, nausea, and vomiting. He was taken to ER, CT scan showed acute subarachnoid hemorrhage centered within the basal cisterns; CTA revealed 4.6 x 3.4 mm anteriorly directed CONCEPCION aneurysm. Cerebral angiogram revealed multiple intracranial aneurysms. He underwent emergent left sided crani and clipping of left MCA, left anterior choroidal, left carotid terminus, left CONCEPCION; and placement of ventriculostomy by Dr. REON Espinal. There remains one unsecured aneurysm on the right. Neurosurgery removed drain 11/1/17. Due to concern about fluctuating mental status and possible seizures continuous EEG performed by Neuro and Keppra dose increased. No clear evidence of seizures. No vasospasm on TCD.     Assessment and Plan     Principal Problem:    SAH (subarachnoid hemorrhage)  Active Problems:    Benign essential HTN    Obstructive hydrocephalus    Brain edema    Aneurysmal subarachnoid hemorrhage    Nontraumatic subarachnoid hemorrhage    Encephalopathy acute    Seizure-like activity    Hypernatremia    Brain compression    Severe malnutrition    Mood disorder due to a general medical condition    Cognitive and behavioral changes    Principal Problem:  SAH (subarachnoid hemorrhage) POD#20 PBD #21 - secondary to ruptured cerebral aneurysm s/p crani with multiple clips    Suture removal on 11/15, recommend to follow up in 2 weeks with CT head.  Target -160  Post op course significant for persistent/flucutating encephalopathy, social issues    Obstructive Hydrocephalus - External ventricular drain removed 11/1/17 by Neurosurgery      Fluctuating mental status/Acute encephalopathy:   Very slow to improve. Still confused and impulsie  No clear seizure on EEG and no  "vasospasm on TCDs.  Keppra  Increase seroquel at bedtime. This will take quite awhile to improve.    Continue to monitor off restraints    Active Problems:  Fevers on admission (resolved now) - possibly central.  Urine Cx negative. Procalcitonin negative and CXR without infiltrates to suggest pneumonia.  Completed 5 days of empiric Zosyn.      Benign essential HTN target normal blood pressure. Continue nimodipine,labetalol and lisinopril      Brain edema - keep sodium in normal to upper normal range      Hyperglycemia - BG monitoring qid and coverage with SSI    Subjective:     Interval History:   Pt seen in presence of interpretor. Off restraints since yesterday but has been impulsive and still confused. He is oriented only to self and does not answer appropriately.    He is following commands.  D/w RN..    Chart reviewed, events noted. Pt seen and examined.     Objective     Vital signs in last 24 hours:  Temp:  [97.8  F (36.6  C)-98.5  F (36.9  C)] 98.5  F (36.9  C)  Heart Rate:  [57-82] 59  Resp:  [18] 18  BP: ()/(49-67) 112/57    /57 (Patient Position: Lying)  Pulse (!) 59  Temp 98.5  F (36.9  C) (Oral)   Resp 18  Ht 5' 8\" (1.727 m)  Wt 178 lb 4.8 oz (80.9 kg)  SpO2 94%  BMI 27.11 kg/m2    Weight:    178 lb 4.8 oz (80.9 kg)  Weight change:   Body mass index is 27.11 kg/(m^2).  BS reviewed     Intake/Output last 3 shifts:   I/O last 3 completed shifts:  In: 780 [P.O.:780]  Out: -   Intake/Output this shift:  I/O this shift:  In: 220 [P.O.:220]  Out: -     O2 Device: None (Room air) O2 Flow Rate (L/min): 0 L/min    Physical Exam:       General- awake, confused, comfortable    HEENT- Atraumatic, surgical site weal healing scar wit crust    Chest- B/L air entry, no wheeze, no crackles, not in distress      CVS- S1S2 regular    Abd- Soft, non tender, non distended    CNS- awake, confused, not agitated, oriented to self, fluent speech, follows commands, no focal deficits    Ext- B/L no pedal edema  "     Imaging:  personally reviewed.      Lab Results:  personally reviewed.   Lab Results   Component Value Date     11/11/2017    K 4.3 11/11/2017     (H) 11/11/2017    CO2 17 (L) 11/11/2017    BUN 21 11/11/2017    CREATININE 0.92 11/11/2017    CALCIUM 9.6 11/11/2017     Lab Results   Component Value Date    WBC 4.3 11/10/2017    HGB 12.7 (L) 11/10/2017    HCT 36.5 (L) 11/10/2017    MCV 90 11/10/2017     11/10/2017     Recent Results (from the past 24 hour(s))   POCT Glucose    Collection Time: 11/10/17 12:54 PM   Result Value Ref Range    Glucose,  mg/dL   POCT Glucose    Collection Time: 11/10/17  5:12 PM   Result Value Ref Range    Glucose, POC 89 mg/dL   POCT Glucose    Collection Time: 11/10/17  8:57 PM   Result Value Ref Range    Glucose,  mg/dL   Basic Metabolic Panel    Collection Time: 11/11/17  4:51 AM   Result Value Ref Range    Sodium 142 136 - 145 mmol/L    Potassium 4.3 3.5 - 5.0 mmol/L    Chloride 114 (H) 98 - 107 mmol/L    CO2 17 (L) 22 - 31 mmol/L    Anion Gap, Calculation 11 5 - 18 mmol/L    Glucose 116 70 - 125 mg/dL    Calcium 9.6 8.5 - 10.5 mg/dL    BUN 21 8 - 28 mg/dL    Creatinine 0.92 0.70 - 1.30 mg/dL    GFR MDRD Af Amer >60 >60 mL/min/1.73m2    GFR MDRD Non Af Amer >60 >60 mL/min/1.73m2   POCT Glucose    Collection Time: 11/11/17  8:19 AM   Result Value Ref Range    Glucose,  mg/dL   POCT Glucose    Collection Time: 11/11/17 11:57 AM   Result Value Ref Range    Glucose,  mg/dL     Advanced Care Planning:     Barriers to discharge: Persistent encephalopathy, placement issues  Anticipated discharge day:   Unknown  Discussed with RN, CM  Disposition: Acute rehab vs LTACH    Ranjit Humphrey MD  [facility]  Hospitalist  Pager [phone]

## 2021-06-14 NOTE — PROGRESS NOTES
NEUROSURGERY POST-OP NOTE:    ASSESSMENT:     Callie Crocker is post bleed day 18, post operative day 17 status post  LEFT SIDED CRANIOTOMY AND CLIPPING OF MULTIPLE INTRACRANIAL ANEURYSMS, INTRA-OP ANGIOS AND PLACEMENT OF VENTRICULOSTOMY  by Dr. Lisette Espinal. Awaiting authorization for [facility], patient can d/c anytime    I will remove his staples this morning. Patient is in restraints due to scratching at his incision, hopefully we can discontinue these.     From neurosurgical perspective he could discharge to [place] at any point. Spoken with care management on several occassions, financial social work, [facility] referral specialist.     PLAN:   Patient Active Problem List   Diagnosis     Benign essential HTN  Target -160, BP has been within goal       Obstructive hydrocephalus     Aneurysm     Brain edema     Aneurysmal subarachnoid hemorrhage     Nontraumatic subarachnoid hemorrhage  Target normal blood glucose to promote wound healing  Monitor incisional site, will remove staples today. Suture to be removed 11/15.  Crani cap over incision at all times  Keep hands away from wound  Continue PT and OT  Discharge to [place] once approved  Tylenol as needed for headache       Encephalopathy acute     Seizure-like activity  -Patients alertness waxes and wanes  Continue Keppra as recommended by neurology       We will sign off. Follow up is scheduled.     Discharge Recommendations/Plan:  Barriers to Discharge: He can discharge from Neurosurgery view. Awaiting [place] approval     Follow Up Plan: 2 weeks with repeat head CT (11/15/2017), additional follow up per Dr. Lisette Espinal, will need surveillance imaging of unsecure aneurysm on the right versus IR coiling         HPI: HPI: Callie Crocker is status post LEFT SIDED CRANIOTOMY AND CLIPPING OF MULTIPLE INTRACRANIAL ANEURYSMS, INTRA-OP ANGIOS AND PLACEMENT OF VENTRICULOSTOMY  by Dr. Lisette Espinal. He is 72 year old male presented after  sudden onset of lightheadedness, dizziness, nausea and vomiting and syncopal event witnessed by his son. CT in the ED revealed subarachnoid hemorrhage, CTA 4.6 x 3.4 mm anteriorly directed CONCEPCION aneurysm. 4V revealed multiple aneurysms as detailed below. He was taken to the OR for clipping with Dr. Espinal. All four of the left sided aneurysms were clipped and completely occluded, the right sided aneurysm remains unsecured. Ventriculostomy was removed 11/1/2017 after weaning and tolerating clamping of the ventric for two days. Postop course has been complicated by fluctuating mental status, social issues, and high risk for vasospasm.    SUBJECTIVE:  Sleepy, patient complaining of headache. Moving all extremities.       OBJECTIVE:  Vital signs in last 24 hours  Temp:  [97.4  F (36.3  C)-98.3  F (36.8  C)] 97.6  F (36.4  C)  Heart Rate:  [60-70] 65  Resp:  [16-19] 19  BP: (109-156)/(58-91) 111/60  Body mass index is 27.57 kg/(m^2).    Mental Status: Napping, wakes easily to verbal stimulation, oriented only to self, tells me he is at my clinic. Thinks its 2009.   Cranial Nerves: PERRL, EOMI, face symmetric  Motor Strength:  Spontaneously moving all extremities with equal strength and coordination.    Incision: well approximated, clean/dry and intact. Old drainage present. Fluid collection anterior to incision, not pulling on incision site. Staples removed. Suture intact at ventric site.     Urinary catheter out since 10/31/2017 3 consecutive bladder scans were all < 300 ml    Pertinent Labs   Lab Results:   CBC:   Lab Results   Component Value Date    WBC 3.8 (L) 11/08/2017    RBC 4.13 (L) 11/08/2017     BMP:   Lab Results   Component Value Date    CO2 20 (L) 11/08/2017    BUN 17 11/08/2017    CREATININE 0.76 11/08/2017    CALCIUM 9.7 11/08/2017       Pertinent Radiology   Radiology Results: None arabella Swanson [facility]  O: [phone]

## 2021-06-14 NOTE — PROGRESS NOTES
"Speech Language/Pathology  Speech Therapy Daily Progress Note    Patient presents as alert and cooperative during this session.  An  was present.    Objective  Drillwork completed with month, year and place to assist patient with orientation and to establish ability to learn new information. Patient initially responded with largely unrelated statements but was able to state the month and year with written cue after drillwork. He was not able to recall the year after a 2 minute delay, but was able to report the month. Patient needed very direct cues and a large amount of repetition to retain this information. Attempted training the location but patient unable to retain.     Simple Attention Task: Locate all the number \"2's\" on a page. Patient was able to complete with 60% accuracy after direct training, modelling, hand over hand and cues. Attempted symbol trails tasks with numbers 1-10. Patient allowed hand over hand but completed 0% on his own or with max cues.     Assessment  Improved ability to attend to information today and potential for learning new basic task with direct teaching and large amount of repetition.     Plan/Recommendations  Continue ST per plan, continue ST per discharge    current goals remain appropriate    28 speech/language minutes     Lesley Phan MA, CCC-SLP      "

## 2021-06-14 NOTE — PROGRESS NOTES
Hospitalist Progress Note        Assessment and Plan  Principal Problem:    SAH (subarachnoid hemorrhage)  Active Problems:    Benign essential HTN    Obstructive hydrocephalus    Brain edema    Aneurysmal subarachnoid hemorrhage    Nontraumatic subarachnoid hemorrhage    Encephalopathy acute    Seizure-like activity    Hypernatremia    Brain compression    Severe malnutrition    Mood disorder due to a general medical condition    Cognitive and behavioral changes      SAH S/P Left craniotomy with clipping  -  POD 23  -  Pain control  -  Post-operative management per NS service  -  Levetiracetam for seizure risk reduction  -  Continue nimodipine to reduce risk of vasospasm  -  BP control    Cognitive impairment  -  Likely secondary to SAH  -  Continue quetiapine    Essential hypertension  -  BP controlled  -  Continue labetalol  -  Continue lisinopril  -  Monitoring BP    VTE risk reduction.  Ambulation.    Discharge.  Possible discharge home on 11/15.          Brief Summary  72-year-old gentleman with a medical history significant for hypertension who incurred subarachnoid hemorrhage.  He underwent Left craniotomy with placement of multiple cerebrovascular clips on 10/22/2017.    Subjective  Did not report headache.    Physical Examination    Vital signs in last 24 hours  Temp:  [97.5  F (36.4  C)-98.9  F (37.2  C)] 97.7  F (36.5  C)  Heart Rate:  [67-92] 71  Resp:  [16-18] 16  BP: ()/(52-70) 110/63 99% O2 Device: None (Room air) O2 Flow Rate (L/min): 0 L/min  Weight:   176 lb (79.8 kg) Weight change:     General: Awake, comfortable, NAD.   HEENT: Sclera white. No redness or jaundice.   Cardiac: RRR, no abnormal heart sounds   Pulmonary: CTA abdiaziz   Abdomen: Nondistended.   Musculoskeletal: No joint abnormalities noted.  Skin: Scalp surgical incision shows normal healing.  Neurologic: Awake, alert, interactive.  Psychiatric: Calm    Intake/Output last 3 shifts  I/O last 3 completed shifts:  In: 730  [P.O.:730]  Out: -   Body mass index is 26.76 kg/(m^2).        Pertinent Labs   Lab Results: personally reviewed.     Results from last 7 days  Lab Units 11/14/17  0442 11/13/17  0436 11/12/17  0426 11/11/17  0451 11/10/17  0440 11/09/17 0415 11/08/17  0458   LN-SODIUM mmol/L  --   --  142 142 139 139 140   LN-POTASSIUM mmol/L 4.1 4.3 4.2 4.3 3.9 4.4 4.1   LN-CHLORIDE mmol/L  --   --  113* 114* 110* 109* 111*   LN-CO2 mmol/L  --   --  20* 17* 22 19* 20*   LN-BLOOD UREA NITROGEN mg/dL  --   --  24 21 16 16 17   LN-CREATININE mg/dL  --   --  0.81 0.92 0.75 0.73 0.76   LN-CALCIUM mg/dL  --   --  9.6 9.6 9.8 9.7 9.7   LN-MAGNESIUM mg/dL  --   --   --   --   --  2.0 1.9       Results from last 7 days  Lab Units 11/10/17  0440 11/09/17  0415 11/08/17  0458   LN-WHITE BLOOD CELL COUNT thou/uL 4.3 5.3 3.8*   LN-HEMOGLOBIN g/dL 12.7* 13.1* 12.7*   LN-HEMATOCRIT % 36.5* 38.1* 37.7*   LN-PLATELET COUNT thou/uL 321 333 336           Results from last 7 days  Lab Units 11/13/17  1307 11/12/17  2006 11/12/17  1636 11/12/17  1208 11/12/17  0757 11/11/17  2109 11/11/17  1619 11/11/17  1157 11/11/17  0819 11/10/17  2057   LN-POC GLUCOSE FINGERSTICK- HE mg/dL 130 180 148 152 135 143 142 166 135 134         Pertinent Radiology   Radiology Results: Personally reviewed impression/s     [facility]  CT HEAD WO CONTRAST  11/1/2017 5:44 AM     INDICATION: Intracranial hemorrhage, hydrocephalus, ventric clamped since 10/30/2017  TECHNIQUE: Without IV contrast. Dose reduction techniques were used.  CONTRAST: None  COMPARISON:  10/31/2017     FINDINGS: Redemonstration of left craniotomy and aneurysm clipping. Mild ischemic change left frontal lobe and basal ganglia, stable. Stable elliptical collection deep to the craniectomy site. Stable left to right midline shift. Stable left frontal shunt   tube. Stable ventricular size stable small amount of intraventricular hemorrhage. Stable small amount of bilateral subarachnoid hemorrhage.  Normal orbits. Clear paranasal sinuses and mastoid air cells.     IMPRESSION:   CONCLUSION:  No significant change.            EKG Results: personally reviewed            Juan A Vizcaino MD, MS  Internal Medicine Hospitalist  11/14/2017

## 2021-06-14 NOTE — PROGRESS NOTES
Spoke with [facility] again today regarding the issue with patient's EMA not showing up in the [employer] system which is needed for [place] and for the EMA Care Plan Request to be processed. They state the have brought the issue to a supervisor regarding the urgency of getting this resolved, but as of now the issue has not been resolved (per [employer] it is still not in their system). [facility] (the state) is closed tomorrow for the holiday but [employer] is open. Will re-submit EMA Care Plan Request once patients EMA shows up in the [employer] system.  Raghav, SIOMARA [phone]

## 2021-06-14 NOTE — PROGRESS NOTES
11/09/17 0400   Charting Type   Charting Type Reassessment   Neuro Standard   Neuro (WDL) X   Orientation Level Oriented to person   Confusion Yes   Level of Consciousness 1   R Pupil Size (mm) 3 mm   L Pupil Size (mm) 3 mm   R Pupil Reaction Brisk   L Pupil Reaction Brisk   R Pupil Shape Round   L Pupil Shape Round   Speech Appropriate for developmental age   Best Motor Strength-Right Arm 0   Best Motor Strength-Left Arm 0   Best Motor Strength-Right Leg 0   Best Motor Strength-Left Leg 0   Gait/Movement Unsteady   LLE Sensation Unable to assess   LUE Sensation Unable to assess   RLE Sensation Unable to assess   RUE Sensation Unable to assess   Jose Coma Scale   Eye Opening 3   Best Verbal Response 4   Best Motor Response 6   [place] Coma Scale Score 13   Coma Assessment   Primary Gaze Conjugate   Gaze Preference Eyes midline   Right Upper Motor Response Spontaneous purposeful   Left Upper Motor Response Spontaneous purposeful   Right Lower Motor Response Spontaneous purposeful   Left Lower Motor Response Spontaneous purposeful   HEENT   HEENT (WDL) WDL   Respiratory   Respiratory (WDL) WDL   Respiratory Pattern Regular   Chest Assessment Chest expansion symmetrical   Bilateral Breath Sounds Clear   Cardiac   Cardiac (WDL) WDL   Cardiac Rhythm Normal sinus rhythm   Cardiac Monitoring Yes   Bedside Cardiac Monitor On Yes   Telemetry Cardiac Monitor On Yes   Cardiac Alarms audible Yes   Cardiac Alarms Appropriate/Set Yes   Skin   Red Blanchable Buttocks/IT   CAM -Confusion Assessment Method   Acute Onset 1A No   Fluctuating Course 1B No   Inattention 2 Yes   Disorganized Thinking 3 Yes   Rate Patient's Level of Consciousness 4 Stupor (Difficult to arouse), Yes   Delirium Present No   RASS Score ([place] Agitation Sedation Scale)   RASS Score -1 Drowsy   Reassessment Verification (see Row Information)   Full Head to Toe Assessment NO change since MY previous assessment   Focused Assessment  NO change since MY  previous assessment;Neurological;HEENT;Respiratory;Cardiac;Peripheral Vascular;Skin;Musculoskeletal;Gastrointestinal;Genitourinary;Psychosocial   Previous Assessment Time 0000   Patient remained stable and alert and oriented to person throughout the night. SR on the cardiac monitor. Patient is on bilateral wrist restraints for safety reasons. Will continue to monitor.

## 2021-06-14 NOTE — PROGRESS NOTES
Hospitalist Progress Note      Date of Service: 11/9/2017     Brief summary:   Callie Olivier a 72 y.o.male with past medical history of hypertension who presented 10/21/17 after a witnessed syncopal event. He had sudden onset dizziness, light headedness, nausea, and vomiting. He was taken to ER, CT scan showed acute subarachnoid hemorrhage centered within the basal cisterns; CTA revealed 4.6 x 3.4 mm anteriorly directed CONCEPCION aneurysm. Cerebral angiogram revealed multiple intracranial aneurysms. He underwent emergent left sided crani and clipping of left MCA, left anterior choroidal, left carotid terminus, left CONCEPCION; and placement of ventriculostomy by Dr. RENO Espinal. There remains one unsecured aneurysm on the right. Neurosurgery removed drain 11/1/17. Due to concern about fluctuating mental status and possible seizures continuous EEG performed by Neuro and Keppra dose increased. No clear evidence of seizures. No vasospasm on TCD.     Assessment and Plan     Principal Problem:    SAH (subarachnoid hemorrhage)  Active Problems:    Benign essential HTN    Obstructive hydrocephalus    Brain edema    Aneurysmal subarachnoid hemorrhage    Nontraumatic subarachnoid hemorrhage    Encephalopathy acute    Seizure-like activity    Hypernatremia    Brain compression    Severe malnutrition    Mood disorder due to a general medical condition    Cognitive and behavioral changes    Principal Problem:  SAH (subarachnoid hemorrhage) POD#18, PBD #19 - secondary to ruptured cerebral aneurysm s/p crani with multiple clips    Suture removal on 11/15, recommend to follow up in 2 weeks with CT head.  Target -160  Post op course significant for persistent/flucutating encephalopathy, social issues    Obstructive Hydrocephalus - External ventricular drain removed 11/1/17 by Neurosurgery      Fluctuating mental status/Acute encephalopathy:   Very slow to improve.  Confused and impulsivity.   No clear seizure on EEG and no  "vasospasm on TCDs.  Keppra  Restraints prn for pt's safety/scratching at incision  Low dose seroquel.  This will take quite awhile to improve.        Active Problems:  Fevers on admission (resolved now) - possibly central.  Urine Cx negative. Procalcitonin negative and CXR without infiltrates to suggest pneumonia.  Completed 5 days of empiric Zosyn.      Benign essential HTN target normal blood pressure. Continue nimodipine,labetalol and lisinopril      Brain edema - keep sodium in normal to upper normal range      Hyperglycemia - BG monitoring qid and coverage with SSI    Subjective:     Interval History:  Discussed with nursing staff, Pt is oriented to self and place but still confused. Still needing restraints as pt still trying to scratch on his scalp wound. Needing assistance with meds and feeding but no signs of aspiration.    Chart reviewed, events noted. Pt seen and examined.     Objective     Vital signs in last 24 hours:  Temp:  [97.4  F (36.3  C)-98.8  F (37.1  C)] 97.6  F (36.4  C)  Heart Rate:  [57-74] 68  Resp:  [16-20] 18  BP: ()/(56-73) 93/56    BP 93/56  Pulse 68  Temp 97.6  F (36.4  C) (Axillary)   Resp 18  Ht 5' 8\" (1.727 m)  Wt 178 lb 4.8 oz (80.9 kg)  SpO2 97%  BMI 27.11 kg/m2    Weight:    178 lb 4.8 oz (80.9 kg)  Weight change: -3 lb (-1.361 kg)  Body mass index is 27.11 kg/(m^2).  BS reviewed     Intake/Output last 3 shifts:   I/O last 3 completed shifts:  In: 240 [P.O.:240]  Out: 250 [Urine:250]  Intake/Output this shift:       O2 Device: None (Room air) O2 Flow Rate (L/min): 0 L/min    Physical Exam:       General- awake, confused, comfortable    HEENT- Atraumatic    Chest- B/L air entry, no wheeze, no crackles, not in distress      CVS- S1S2 regular    Abd- Soft, non tender, non distended    CNS- awake, confused, oriented to self, fluent speech, follows commands, no focal deficits    Ext- B/L no pedal edema      Imaging:  personally reviewed.        Lab Results:  personally " reviewed.   Lab Results   Component Value Date     11/09/2017    K 4.4 11/09/2017     (H) 11/09/2017    CO2 19 (L) 11/09/2017    BUN 16 11/09/2017    CREATININE 0.73 11/09/2017    CALCIUM 9.7 11/09/2017     Lab Results   Component Value Date    WBC 5.3 11/09/2017    HGB 13.1 (L) 11/09/2017    HCT 38.1 (L) 11/09/2017    MCV 90 11/09/2017     11/09/2017     Recent Results (from the past 24 hour(s))   POCT Glucose    Collection Time: 11/08/17  4:32 PM   Result Value Ref Range    Glucose, POC 97 mg/dL   POCT Glucose    Collection Time: 11/08/17  8:29 PM   Result Value Ref Range    Glucose,  mg/dL   Basic Metabolic Panel    Collection Time: 11/09/17  4:15 AM   Result Value Ref Range    Sodium 139 136 - 145 mmol/L    Potassium 4.4 3.5 - 5.0 mmol/L    Chloride 109 (H) 98 - 107 mmol/L    CO2 19 (L) 22 - 31 mmol/L    Anion Gap, Calculation 11 5 - 18 mmol/L    Glucose 117 70 - 125 mg/dL    Calcium 9.7 8.5 - 10.5 mg/dL    BUN 16 8 - 28 mg/dL    Creatinine 0.73 0.70 - 1.30 mg/dL    GFR MDRD Af Amer >60 >60 mL/min/1.73m2    GFR MDRD Non Af Amer >60 >60 mL/min/1.73m2   HM2(CBC W/O DIFF)    Collection Time: 11/09/17  4:15 AM   Result Value Ref Range    WBC 5.3 4.0 - 11.0 thou/uL    RBC 4.23 (L) 4.40 - 6.20 mill/uL    Hemoglobin 13.1 (L) 14.0 - 18.0 g/dL    Hematocrit 38.1 (L) 40.0 - 54.0 %    MCV 90 80 - 100 fL    MCH 31.0 27.0 - 34.0 pg    MCHC 34.4 32.0 - 36.0 g/dL    RDW 14.8 (H) 11.0 - 14.5 %    Platelets 333 140 - 440 thou/uL    MPV 10.1 8.5 - 12.5 fL   Magnesium    Collection Time: 11/09/17  4:15 AM   Result Value Ref Range    Magnesium 2.0 1.8 - 2.6 mg/dL   POCT Glucose    Collection Time: 11/09/17  8:28 AM   Result Value Ref Range    Glucose,  mg/dL   POCT Glucose    Collection Time: 11/09/17 12:12 PM   Result Value Ref Range    Glucose,  mg/dL     Advanced Care Planning:     Barriers to discharge: Persistent encephalopathy placement issues  Anticipated discharge day:    Unknown  Discussed with RNCLARK  Disposition: Acute rehab vs LTACH    Ranjit Humphrey MD  [facility]  Hospitalist  Pager [phone]

## 2021-06-14 NOTE — PROGRESS NOTES
Hospitalist Progress Note      Date of Service: 11/7/2017     Brief summary:   Callie Olivier a 72 y.o.male with past medical history of hypertension who presented 10/21/17 after a witnessed syncopal event. He had sudden onset dizziness, light headedness, nausea, and vomiting. He was taken to ER, CT scan showed acute subarachnoid hemorrhage centered within the basal cisterns; CTA revealed 4.6 x 3.4 mm anteriorly directed CONCEPCION aneurysm. Cerebral angiogram revealed multiple intracranial aneurysms. He underwent emergent left sided crani and clipping of left MCA, left anterior choroidal, left carotid terminus, left CONCEPCION; and placement of ventriculostomy by Dr. RENO Espinal. There remains one unsecured aneurysm on the right. Neurosurgery removed drain 11/1/17. Due to concern about fluctuating mental status and possible seizures continuous EEG performed by Neuro and Keppra dose increased. No clear evidence of seizures. No vasospasm on TCD.     Assessment and Plan     Principal Problem:    SAH (subarachnoid hemorrhage)  Active Problems:    Benign essential HTN    Obstructive hydrocephalus    Brain edema    Aneurysmal subarachnoid hemorrhage    Nontraumatic subarachnoid hemorrhage    Encephalopathy acute    Seizure-like activity    Hypernatremia    Brain compression    Severe malnutrition    Mood disorder due to a general medical condition    Cognitive and behavioral changes    Principal Problem:    SAH (subarachnoid hemorrhage) POD#16, PBD #17 - secondary to ruptured cerebral aneurysm s/p crani with multiple clips  NSG removed staples today and suture removal on 11/15, recommend to follow up in 2 weeks with CT head.  Post op course significant for persistent/flucutating encephalopathy, social issues    Obstructive Hydrocephalus - External ventricular drain removed 11/1/17 by Neurosurgery        Fluctuating mental status/Acute encephalopathy:   Very slow to improve.  Confused and impulsivity.   No clear seizure on EEG  "and no vasospasm on TCDs.  Keppra  Restraints prn for pt's safety/scratching at incision  Low dose seroquel.  This will take quite awhile to improve.        Active Problems:    Fevers on admission (resolved now) - possibly central.  Urine Cx negative. Procalcitonin negative and CXR without infiltrates to suggest pneumonia.  Completed 5 days of empiric Zosyn.        Benign essential HTN target normal blood pressure. Continue nimodipine,labetalol and lisinopril        Brain edema - keep sodium in normal to upper normal range       Hyperglycemia - BG monitoring qid and coverage with SSI    Subjective:     Interval History: Pt seen in presence of Romanian interpretor. Reports mild headache. Pt is eating with assistance. Soft restraints as he is scratching on his incision. Still quite confused, oriented only to self.      Chart reviewed, events noted. Pt seen and examined.     Objective     Vital signs in last 24 hours:  Temp:  [96.5  F (35.8  C)-98.2  F (36.8  C)] 97.9  F (36.6  C)  Heart Rate:  [60-70] 60  Resp:  [17-20] 18  BP: ()/(55-71) 137/68    /68 (Patient Position: Lying)  Pulse 60  Temp 97.9  F (36.6  C) (Oral)   Resp 18  Ht 5' 8\" (1.727 m)  Wt 180 lb 1.6 oz (81.7 kg)  SpO2 95%  BMI 27.38 kg/m2    Weight:    180 lb 1.6 oz (81.7 kg)  Weight change:   Body mass index is 27.38 kg/(m^2).  BS reviewed     Intake/Output last 3 shifts:   I/O last 3 completed shifts:  In: 880 [P.O.:880]  Out: -   Intake/Output this shift:       O2 Device: None (Room air) O2 Flow Rate (L/min): 0 L/min    Physical Exam:       General- awake, confused, comfortable    HEENT- Atraumatic    Chest- B/L air entry, no wheeze, no crackles, not in distress      CVS- S1S2 regular    Abd- Soft, non tender, non distended    CNS- awake, confused, oriented to self, fluent speech, follows commands, no focal deficits    Ext- B/L no pedal edema      Imaging:  personally reviewed.        Lab Results:  personally reviewed.   Lab Results "   Component Value Date     11/07/2017    K 4.1 11/07/2017     (H) 11/07/2017    CO2 17 (L) 11/07/2017    BUN 23 11/07/2017    CREATININE 0.72 11/07/2017    CALCIUM 9.4 11/07/2017     Lab Results   Component Value Date    WBC 4.3 11/07/2017    HGB 12.5 (L) 11/07/2017    HCT 36.7 (L) 11/07/2017    MCV 91 11/07/2017     11/07/2017     Recent Results (from the past 24 hour(s))   POCT Glucose    Collection Time: 11/06/17 11:41 AM   Result Value Ref Range    Glucose,  mg/dL   POCT Glucose    Collection Time: 11/06/17  5:17 PM   Result Value Ref Range    Glucose,  mg/dL   POCT Glucose    Collection Time: 11/06/17  8:34 PM   Result Value Ref Range    Glucose,  mg/dL   Basic Metabolic Panel    Collection Time: 11/07/17  4:50 AM   Result Value Ref Range    Sodium 140 136 - 145 mmol/L    Potassium 4.1 3.5 - 5.0 mmol/L    Chloride 114 (H) 98 - 107 mmol/L    CO2 17 (L) 22 - 31 mmol/L    Anion Gap, Calculation 9 5 - 18 mmol/L    Glucose 121 70 - 125 mg/dL    Calcium 9.4 8.5 - 10.5 mg/dL    BUN 23 8 - 28 mg/dL    Creatinine 0.72 0.70 - 1.30 mg/dL    GFR MDRD Af Amer >60 >60 mL/min/1.73m2    GFR MDRD Non Af Amer >60 >60 mL/min/1.73m2   HM2(CBC W/O DIFF)    Collection Time: 11/07/17  4:50 AM   Result Value Ref Range    WBC 4.3 4.0 - 11.0 thou/uL    RBC 4.04 (L) 4.40 - 6.20 mill/uL    Hemoglobin 12.5 (L) 14.0 - 18.0 g/dL    Hematocrit 36.7 (L) 40.0 - 54.0 %    MCV 91 80 - 100 fL    MCH 30.9 27.0 - 34.0 pg    MCHC 34.1 32.0 - 36.0 g/dL    RDW 14.8 (H) 11.0 - 14.5 %    Platelets 354 140 - 440 thou/uL    MPV 9.9 8.5 - 12.5 fL   Magnesium    Collection Time: 11/07/17  4:50 AM   Result Value Ref Range    Magnesium 2.0 1.8 - 2.6 mg/dL   POCT Glucose    Collection Time: 11/07/17  8:13 AM   Result Value Ref Range    Glucose,  mg/dL     Advanced Care Planning:     Barriers to discharge: Persistent encephalopathy placement issues  Anticipated discharge day:  Few days  Discussed with RN,  CM  Disposition: Acute rehab vs LTACH    Ranjit Humphrey MD  [facility]  Hospitalist  Pager [phone]

## 2021-06-14 NOTE — PROGRESS NOTES
Speech Language/Pathology  Speech Therapy Daily Progress Note    Patient presents as lethargic and cooperative during this session.  An  was present via language line on [employer]    Objective  Orientation:  -place: unable to recall that he is in the hospital and stated that he was at his son's house. When he was re-oriented to hospital, he stated that he had left the hospital and he was not there anymore.    Spent a few minutes to redirect him.  He was unable to recall he was in hospital after 30 second delay.  He started to fall asleep and it was difficult to keep/maintaint attention.  Treatment needed to be shortened d/t fatigue.    Assessment  Poor insight, attention and very poor memory.    Plan/Recommendations  Would like to see pt when more alert.     new/updated goals    10 speech/language minutes     Misti Weston MS, CCC-SLP

## 2021-06-14 NOTE — PROGRESS NOTES
Per Ke, patient's initial EMA Care Plan Request has been approved for diagnoses: 160.8, G93.5, G91.1, G93.4 effective 10/21/17 through 4/18/18. Raghav, SIOMARA [phone]

## 2021-06-14 NOTE — PROGRESS NOTES
Per [facility] and [employer], patient does show up in the [employer] system starting today (even though EMA was approved back on 11/3/17), therefore EMA Care Plan Certification Request was resubmitted. Raghav, SIOMARA [phone]

## 2021-06-14 NOTE — PROGRESS NOTES
Speech Language/Pathology  Speech Therapy Daily Progress Note    Patient presents as lethargic and cooperative during this session.  An  was present.    Objective  Orientation: Pt oriented to name, month of birth, and day of birth. No oriented to year of birth given a F:2 (2015) or age given a F:2. Not oriented to month/year.    Memory: No  Immediate recall of month and year after given the correct response x2     Opposites: 0/3 despite visual cues, F:2 options, and repetitions, modeling    Confrontation Naming: x3 tash, x1 imitation, x1 no response-unable to maintain alertness to visualize picture    Assessment  Severe STM deficits. Verbal output comprised mainly of tangential comments which were difficult to redirect.     Plan/Recommendations  Continue POC    current goals remain appropriate    15 speech/language minutes     Corina Stack MS, CCC-SLP

## 2021-06-14 NOTE — PROGRESS NOTES
"  Clinical Nutrition Therapy Follow Up Note    Subjective:  Patient took a few sips of Boost GC for writer.  Noted 3 unopened Boost in room.    Nutrition HX: He normally eats beans and dairy as his protein sources, but is receiving meat in the hospital per his daughter's suggestions.  His dentures are loose-fitting and he needs denture adhesive.        Current Nutrition Prescription:   Diet: diabetic  Supplements:chocolate Boost GC BID, yogurt bid with meals.    Current Nutrition Intake:  Eating 10-20% at meals.  Meal trays are generated.    Intake over the past week is decreased.    Anthropometrics:  Height: 5' 8\" (172.7 cm)  Admission weight:201#  Weight: 178 lb 4.8 oz (80.9 kg)  BMI 28.4  BMI indication: 25-29.9 overweight  Ideal body weight 154#  Weight History:loss of 7% weight in 2 weeks    Physical Findings:  Physical signs which could indicate malnutrition: generalized edema +1    GI Status/Output:   LBM 11/7.    Skin/Wound:  Harry Scale Score: 16    Labs:  Labs reviewed.  FSBG .    Malnutrition: Noted previously 11/6.    Nutrition Risk Level: moderate risk    Nutrition dx:  severe protein calorie malnutrition in the context of acute illness d/t confusion and food preferences as evidenced by 7% unintentional weight loss since admission and <50% estimated energy needs for >5 days    Goal:  Avoid wt loss in excess of 2# per week-not met    Intervention:  Continue diabetic diet, Boost GC.   Encourage food/fluid intake.   Pt is not appropriate for diabetic diet ed d/t cognition.      Monitoring:  Weight, po intake    See Care Plan for Problems, Goals, and Interventions.  "

## 2021-06-14 NOTE — PROGRESS NOTES
Keppra stop date per Dr DIANA Mcduffie, [employer]  Jan 20 2018  As derived from inpatient orders and telephone conversation to Demetrio Kaur PharmD earlier today

## 2021-06-14 NOTE — PROGRESS NOTES
"DIABETES CARE  Follow-up to last note on 10/30/17  Have been following pt in-between.    No DM diagnosis.  Hyperglycemia now listed in problem list.    BGs:  All am glucose (assumed fasting) have been over 125mg/dL except one at 121 (today).    Other bgs during the day: Has had several 180-200s during the admission.  Most are 120s or higher.    No A1C checked; pt has slightly low Hgb    Orders to address bgs:  - Novolog for correction, sensitive scale TIDAC  And hs sensitive scale at hs  - Diabetic diet   Intake is variable; often poor-fair    Pt continues to be confused per notes.    Assessment/Recommend:  No diagnosed diabetes despite several am fasting glucoses over 125mg/dL.  While pt may be usually meeting hospital bg goals, bgs are usually above \"normal\" bgs of  pre-meal.   Perhaps acute illness is the reason for hesitation to dx.  Consider this being addressed prior to discharge home, however.   May benefit from starting metformin (ER recommended) if able.   Could also try checking an A1C, though keep in mind the lower Hgb may alter the result.    Looks like plans for [facility], so will pass along info to Eulalia Peoples RN, CDE.    Thanks!   Emperatriz Simon RD, LD, CDE  [phone] pager        "

## 2021-06-14 NOTE — PROGRESS NOTES
Hospitalist Progress Note      Date of Service: 11/8/2017     Brief summary:   Callie Olivier a 72 y.o.male with past medical history of hypertension who presented 10/21/17 after a witnessed syncopal event. He had sudden onset dizziness, light headedness, nausea, and vomiting. He was taken to ER, CT scan showed acute subarachnoid hemorrhage centered within the basal cisterns; CTA revealed 4.6 x 3.4 mm anteriorly directed CONCEPCION aneurysm. Cerebral angiogram revealed multiple intracranial aneurysms. He underwent emergent left sided crani and clipping of left MCA, left anterior choroidal, left carotid terminus, left CONCEPCION; and placement of ventriculostomy by Dr. RENO Espinal. There remains one unsecured aneurysm on the right. Neurosurgery removed drain 11/1/17. Due to concern about fluctuating mental status and possible seizures continuous EEG performed by Neuro and Keppra dose increased. No clear evidence of seizures. No vasospasm on TCD.     Assessment and Plan     Principal Problem:    SAH (subarachnoid hemorrhage)  Active Problems:    Benign essential HTN    Obstructive hydrocephalus    Brain edema    Aneurysmal subarachnoid hemorrhage    Nontraumatic subarachnoid hemorrhage    Encephalopathy acute    Seizure-like activity    Hypernatremia    Brain compression    Severe malnutrition    Mood disorder due to a general medical condition    Cognitive and behavioral changes    Principal Problem:  SAH (subarachnoid hemorrhage) POD#17, PBD #18 - secondary to ruptured cerebral aneurysm s/p crani with multiple clips    Suture removal on 11/15, recommend to follow up in 2 weeks with CT head.  Target -160  Post op course significant for persistent/flucutating encephalopathy, social issues    Obstructive Hydrocephalus - External ventricular drain removed 11/1/17 by Neurosurgery      Fluctuating mental status/Acute encephalopathy:   Very slow to improve.  Confused and impulsivity.   No clear seizure on EEG and no  "vasospasm on TCDs.  Keppra  Restraints prn for pt's safety/scratching at incision  Low dose seroquel.  This will take quite awhile to improve.        Active Problems:  Fevers on admission (resolved now) - possibly central.  Urine Cx negative. Procalcitonin negative and CXR without infiltrates to suggest pneumonia.  Completed 5 days of empiric Zosyn.      Benign essential HTN target normal blood pressure. Continue nimodipine,labetalol and lisinopril      Brain edema - keep sodium in normal to upper normal range      Hyperglycemia - BG monitoring qid and coverage with SSI    Subjective:     Interval History:  Pt reports mild headache. Discussed with nursing staff and patient is still confused and attempts to scratch on his incision and restraints were reodered today. Hopefully patient improves and can discontinue this. He is eating with assistance and takes some time to take his pills but does ultimately swallow dowh.    Chart reviewed, events noted. Pt seen and examined.     Objective     Vital signs in last 24 hours:  Temp:  [97.1  F (36.2  C)-98.3  F (36.8  C)] 97.1  F (36.2  C)  Heart Rate:  [60-70] 63  Resp:  [16-19] 18  BP: ()/(57-91) 95/57    BP 95/57 (Patient Position: Lying)  Pulse 63  Temp 97.1  F (36.2  C) (Axillary)   Resp 18  Ht 5' 8\" (1.727 m)  Wt 181 lb 4.8 oz (82.2 kg)  SpO2 100%  BMI 27.57 kg/m2    Weight:    181 lb 4.8 oz (82.2 kg)  Weight change: 1 lb 3.2 oz (0.544 kg)  Body mass index is 27.57 kg/(m^2).  BS reviewed     Intake/Output last 3 shifts:      Intake/Output this shift:       O2 Device: None (Room air) O2 Flow Rate (L/min): 0 L/min    Physical Exam:       General- awake, confused, comfortable    HEENT- Atraumatic    Chest- B/L air entry, no wheeze, no crackles, not in distress      CVS- S1S2 regular    Abd- Soft, non tender, non distended    CNS- awake, confused, oriented to self, fluent speech, follows commands, no focal deficits    Ext- B/L no pedal edema  "     Imaging:  personally reviewed.        Lab Results:  personally reviewed.   Lab Results   Component Value Date     11/08/2017    K 4.1 11/08/2017     (H) 11/08/2017    CO2 20 (L) 11/08/2017    BUN 17 11/08/2017    CREATININE 0.76 11/08/2017    CALCIUM 9.7 11/08/2017     Lab Results   Component Value Date    WBC 3.8 (L) 11/08/2017    HGB 12.7 (L) 11/08/2017    HCT 37.7 (L) 11/08/2017    MCV 91 11/08/2017     11/08/2017     Recent Results (from the past 24 hour(s))   POCT Glucose    Collection Time: 11/07/17  5:50 PM   Result Value Ref Range    Glucose,  mg/dL   POCT Glucose    Collection Time: 11/07/17  8:56 PM   Result Value Ref Range    Glucose,  mg/dL   Basic Metabolic Panel    Collection Time: 11/08/17  4:58 AM   Result Value Ref Range    Sodium 140 136 - 145 mmol/L    Potassium 4.1 3.5 - 5.0 mmol/L    Chloride 111 (H) 98 - 107 mmol/L    CO2 20 (L) 22 - 31 mmol/L    Anion Gap, Calculation 9 5 - 18 mmol/L    Glucose 117 70 - 125 mg/dL    Calcium 9.7 8.5 - 10.5 mg/dL    BUN 17 8 - 28 mg/dL    Creatinine 0.76 0.70 - 1.30 mg/dL    GFR MDRD Af Amer >60 >60 mL/min/1.73m2    GFR MDRD Non Af Amer >60 >60 mL/min/1.73m2   HM2(CBC W/O DIFF)    Collection Time: 11/08/17  4:58 AM   Result Value Ref Range    WBC 3.8 (L) 4.0 - 11.0 thou/uL    RBC 4.13 (L) 4.40 - 6.20 mill/uL    Hemoglobin 12.7 (L) 14.0 - 18.0 g/dL    Hematocrit 37.7 (L) 40.0 - 54.0 %    MCV 91 80 - 100 fL    MCH 30.8 27.0 - 34.0 pg    MCHC 33.7 32.0 - 36.0 g/dL    RDW 14.7 (H) 11.0 - 14.5 %    Platelets 336 140 - 440 thou/uL    MPV 10.0 8.5 - 12.5 fL   Magnesium    Collection Time: 11/08/17  4:58 AM   Result Value Ref Range    Magnesium 1.9 1.8 - 2.6 mg/dL   POCT Glucose    Collection Time: 11/08/17  7:58 AM   Result Value Ref Range    Glucose,  mg/dL   POCT Glucose    Collection Time: 11/08/17 11:40 AM   Result Value Ref Range    Glucose,  mg/dL     Advanced Care Planning:     Barriers to discharge: Persistent  encephalopathy placement issues  Anticipated discharge day:  Few days  Discussed with RN CM  Disposition: Acute rehab vs LTACH    Ranjit Humphrey MD  [facility]  Hospitalist  Pager [phone]

## 2021-06-14 NOTE — PROGRESS NOTES
Speech Language/Pathology  Speech Therapy Daily Progress Note    Patient presents as lethargic and cooperative during this session.  An  was present via  line.      Objective  Memory, Orientation, Amnesia Test (MOAT): 13/100 indicating severe deficits- decline from initial score of 32/100 on 11/6/17     Automatic Speech: Counting 1-10 30% acc given an external aid, repetitions, and models. Patient shut down, closed his eyes, became unintelligible for the  and did not respond to further activities or questions.     Assessment  Session shortened d/t decreased participation. Patient's scores on the MOAT decline from the original assessment indicating decline in function mainly due to increased confusion.     Plan/Recommendations  Reduce frequency 3-5x per week d/t increased confusion and decreased participation    current goals remain appropriate    18 speech/language minutes     Corina Stack MS, CCC-SLP

## 2021-06-15 NOTE — PROGRESS NOTES
Antolin Crocker is status post Left sided craniotomy and clipping of left middle cerebral aneurysm; Clipping of left anterior choroidal aneurysm; Clipping of left carotid terminus aneurysm; Clipping of left anterior communicating artery aneurysm on 10/22/2017.  Staples were removed in the hospital.  Today he presents in his 3 months follow up. He is here with his family and his . He denies HA, reports forgetfulness and dizziness associated with ambulation. Speech is fluent. Cognition is intact. Gait and balance are slightly off.  Dana Zhong, RN, CNRN

## 2021-06-15 NOTE — PROGRESS NOTES
I had the pleasure of seeing Markel Crocker in follow-up in my neurosurgery clinic.  As you well aware who is a very pleasant 72-year-old who underwent clipping of 4 left-sided aneurysms for ruptured aneurysm.  He is doing very well he denies any numbness weakness or tingling.  He complains of his memory being somewhat hazy at times but otherwise doing well.  He is eating without difficulty.  He denies any diplopia.   On exam his speech is clear fluent and appropriate (via hospital provided )  This is symmetric throughout  Extraocular muscles are intact bilaterally  Tongue and uvula elevate midline  No pronator drift is noted bilaterally  Finger to nose touch is intact bilaterally  Gait is nonantalgic  Incision is healing very nicely    A/P: Markel is a very pleasant 72-year-old with left craniotomy and clipping of 4 aneurysms.  He is doing very well clinically.  I will see him back in clinic in 1 year with a CTA brain with contrast at that time.    Thank you for giving me the opportunity to see this very pleasant patient.  If you have any questions about Markel or any othe patients please feel free to contact me.  Of note I spent greater than 30 minutes counseling the patient regarding his pathology and treatment plan, greater than 50 percent of which was in face to face counseling.    Lisette Espinal MD, FAANS

## 2021-06-16 PROBLEM — I60.8: Status: ACTIVE | Noted: 2017-10-23

## 2021-06-16 PROBLEM — I60.9 SAH (SUBARACHNOID HEMORRHAGE) (H): Status: ACTIVE | Noted: 2017-10-21

## 2021-06-16 PROBLEM — G93.6 BRAIN EDEMA (H): Status: ACTIVE | Noted: 2017-10-23

## 2021-06-16 PROBLEM — R56.9 SEIZURE-LIKE ACTIVITY (H): Status: ACTIVE | Noted: 2017-10-25

## 2021-06-16 PROBLEM — E43 SEVERE MALNUTRITION (H): Status: ACTIVE | Noted: 2017-11-06

## 2021-06-16 PROBLEM — I72.9 ANEURYSM (H): Status: ACTIVE | Noted: 2017-10-22

## 2021-06-16 PROBLEM — I60.9: Status: ACTIVE | Noted: 2017-10-24

## 2021-06-16 PROBLEM — G93.5 BRAIN COMPRESSION (H): Status: ACTIVE | Noted: 2017-10-31

## 2021-06-16 PROBLEM — G93.40 ENCEPHALOPATHY ACUTE: Status: ACTIVE | Noted: 2017-10-24

## 2021-06-16 PROBLEM — E87.0 HYPERNATREMIA: Status: ACTIVE | Noted: 2017-10-26

## 2021-07-01 NOTE — CONSULTS
Consults by Luis Graf MD at 10/23/2017  3:26 PM     Author: Luis Graf MD Service: Neurology Author Type: Physician    Filed: 10/23/2017  6:48 PM Date of Service: 10/23/2017  3:26 PM Status: Signed    : Luis Graf MD (Physician)     Consult Orders    1. Inpatient consult to Neurology Reason for Consult? Aneurysmal subarachnoid hemorrhage; Consult priority: Today (routine); Communication for MD: No phone communication necessary for now [[id number]] ordered by Flori Zamora CNP at 10/23/17 1424             NEUROLOGY   INPATIENT CONSULT NOTE     Requesting Provider: Valentino Issa DO  CC: none  Reason for consultation: Aneurysmal subarachnoid hemorrhage    1. Assessment/Diagnosis/Plan for Hospital Day: 3     SAH (10/21/17, day 2)    S/P left sided craniotomy and cliniping or left MCA, left Ant choroidal, Left carotid, left CONCEPCION) POD 1    Clinical exam: encephalopathic (aphasic?), mild right sided weakness    Radiology: pre-op CTs showing aneurysms and SAH    Recommendations:  1. Repeat head CT (more aphasic and right sided weakness than one would expect)  2. Cont Keppra 7d total until 10/30/17  3. Close neuro checks  4. Nimodipine 21 d  5. SAH and post op management by Neurosurgery      2. TRISHA Crocker is a 72 y.o. male seen for Aneurysmal subarachnoid hemorrhage.  The patient was examined in the ICU.  Family is at the bedside.  They help to translate.  Patient is extubated.  A bandage was noted wraparound has had after craniotomy.  He takes eye contact.  He is confabulating and his words do not make sense in Japanese.  He does not follow any commands.  It is reported per ER and medics that he had a syncopal episode in the store with vomiting and urine incontinence.  Question was if he had a seizure.  After syncopal event he was found to have a subarachnoid hemorrhage   And he has numerous intracranial aneurysms that were found and required surgical attention.  Surgery was  indeed performed by neurosurgery yesterday successfully clipping numerous aneurysms.  Now extubated.  Family noticed that he is not speaking any centrical language and he seems to move less on the right side.  His operative course is very stable.  Patient tolerated procedure very well.  No perioperative complications are reported.     Patient Active Problem List    Diagnosis Date Noted   ? Brain edema 10/23/2017   ? Aneurysmal subarachnoid hemorrhage 10/23/2017   ? Aneurysm 10/22/2017   ? SAH (subarachnoid hemorrhage) 10/21/2017   ? Benign essential HTN    ? Hydrocephalus        Past Surgical History:   Procedure Laterality Date   ? CRANIOTOMY Left 10/22/2017    Procedure: LEFT SIDED CRANIOTOMY AND CLIPPING OF MULTIPLE INTRACRANIAL ANEURYSMS, INTRA-OP ANGIOS AND PLACEMENT OF VENTRICULOSTOMY;  Surgeon: Lisette Espinal MD;  Location: [facility];  Service:        No family history on file.    Social History     Social History   ? Marital status:      Spouse name: N/A   ? Number of children: N/A   ? Years of education: N/A     Occupational History   ? Not on file.     Social History Main Topics   ? Smoking status: Not on file   ? Smokeless tobacco: Not on file   ? Alcohol use Not on file   ? Drug use: Not on file   ? Sexual activity: Not on file     Other Topics Concern   ? Not on file     Social History Narrative    He is mainly Finnish-speaking.  He does have grown children.       No Known Allergies      3. SCHEDULED MEDICATIONS     ? famotidine (PEPCID) injection  20 mg Intravenous BID    Or   ? famotidine  20 mg Oral BID   ? [START ON 10/24/2017] influenza vacc high dose (age 65 or >) (PF) 2017-18  0.5 mL Intramuscular Prior to Discharge   ? insulin aspart (NovoLOG) injection   Subcutaneous Q4H FIXED TIMES   ? levETIRAcetam (KEPPRA) IVPB  500 mg Intravenous Q12H   ? lisinopril  20 mg Oral DAILY   ? niMODipine  60 mg Oral Q4H    Or   ? niMODipine  60 mg Enteral Tube Q4H   ? polyethylene glycol   17 g Oral DAILY   ? senna-docusate  1 tablet Oral BID       4. INFUSIONS     ? niCARdipine 4 mg/hr (10/23/17 1437)   ? sodium chloride 0.9 % with KCl 20 mEq 125 mL/hr (10/23/17 0851)       5. PRN MEDICATIONS   acetaminophen **OR** acetaminophen, bisacodyl, dextrose 50 % (D50W), glucagon (human recombinant), hydrALAZINE, HYDROmorphone, labetalol, magnesium hydroxide, naloxone **OR** naloxone, ondansetron **OR** ondansetron    6. ROS   Review of systems not obtained due to inability to communicate with the patient.     7. PHYSICAL EXAMINATION   Vital signs in last 24 hours:  Vitals:    10/23/17 1400 10/23/17 1415 10/23/17 1430 10/23/17 1445   BP:       Patient Position:       Pulse: 90 86 84 88   Resp: 17 17 16 17   Temp:       TempSrc:       SpO2: 94% 93% 93% 94%   Weight:       Height:         I/O last 3 completed shifts:  In: 3570 [P.O.:710; I.V.:2730; IV Piggyback:130]  Out: 1960 [Urine:1900; Drains:60]        Patient is awake and alert.  He takes eye contact  He tracks my finger  He does not speak English  He does not make sense when he speaks  by his son  He has intermittent following of commands mostly mimicking my movements  His visual fields cannot be tested  His pupils are round reactive to light bilaterally  They are going for 4 mm to 2 mm  He has a corneal reflex  He has a symmetric smile  He has a pronator drift on the right arm and the weak right leg.  Left arm and leg seems to be stronger.  Sensory examination to light touch seems to be symmetric  He has upgoing toes on both sides  He is not hyperreflexic  No tremor or spasticity noted    8. RESULTS REVIEW       8.1 Imaging studies   All studies were personally reviewed     HEAD CTA: 10/21/2017 3:22 PM   1.  4.6 x 3.4 mm anteriorly directed anterior communicating artery aneurysm. It demonstrates a 1.5 mm neck.  2.  No other aneurysm.  3.  No stenosis.  4.  Left vertebral artery PICA termination.     NECK CTA:10/21/2017 3:22 PM   1.  No  significant stenosis in the neck vessels based on NASCET criteria.  2.  No evidence for dissection.  3.  Evidence of an aortic origin of left vertebral artery.    CT HEAD WITHOUT CONTRAST 10/21/2017, 1:44 PM  1.  Acute subarachnoid hemorrhage centered within the basal cisterns. Recommend CT angiogram to evaluate for aneurysm.  2.  Small amount of intraventricular hemorrhage and mild prominence of the temporal horns suggesting early hydrocephalus.    8.2 Laboratory testing     No results found for: CKTOTAL     Lab Results   Component Value Date    WBC 14.9 (H) 10/23/2017    HGB 11.6 (L) 10/23/2017    HCT 32.7 (L) 10/23/2017    MCV 87 10/23/2017     10/23/2017     10/21/2017      No results found for: AMMONIA   Lab Results   Component Value Date    ALT 24 10/21/2017    AST 25 10/21/2017    ALKPHOS 110 10/21/2017    BILITOT 0.8 10/21/2017       Results from last 7 days  Lab Units 10/23/17  0453 10/21/17  2031 10/21/17  1321   LN-SODIUM mmol/L 137  --  138   LN-POTASSIUM mmol/L 3.7 4.2 4.1   LN-CHLORIDE mmol/L 110*  --  106   LN-CO2 mmol/L 19*  --  25   LN-BLOOD UREA NITROGEN mg/dL 21  --  12   LN-CREATININE mg/dL 0.88  --  0.79   LN-CALCIUM mg/dL 8.0*  --  9.1   LN-PROTEIN TOTAL g/dL  --   --  7.4   LN-BILIRUBIN TOTAL mg/dL  --   --  0.8   LN-ALKALINE PHOSPHATASE U/L  --   --  110   LN-ALT (SGPT) U/L  --   --  24   LN-AST (SGOT) U/L  --   --  25   LN-INR   --   --  1.04         Time spent on counseling/coordination of care: 1 Hour. More than 50% of this time during the visit for Callie Crocker was devoted to counseling and coordination of care, including face to face time with the patient, time spent reviewing data, obtaining patient information, and discussing the case with other health care providers. I discussed Stroke.       Luis Graf MD, PhD  Neurology & Sleep Medicine  [facility]  [phone]

## 2022-02-18 NOTE — PROGRESS NOTES
Referral Specialist note: Patient does meet criteria for ltach level of care at [place]. [facility] will continue to review plan of care and acceptance based on Ventri removal and insurance pending.     Job Mcfadden RT  Referral Specialist.     Minoxidil Pregnancy And Lactation Text: This medication has not been assigned a Pregnancy Risk Category but animal studies failed to show danger with the topical medication. It is unknown if the medication is excreted in breast milk.